# Patient Record
Sex: FEMALE | Race: OTHER | NOT HISPANIC OR LATINO
[De-identification: names, ages, dates, MRNs, and addresses within clinical notes are randomized per-mention and may not be internally consistent; named-entity substitution may affect disease eponyms.]

---

## 2019-12-09 ENCOUNTER — RESULT REVIEW (OUTPATIENT)
Age: 39
End: 2019-12-09

## 2020-02-05 ENCOUNTER — APPOINTMENT (OUTPATIENT)
Dept: HUMAN REPRODUCTION | Facility: CLINIC | Age: 40
End: 2020-02-05
Payer: COMMERCIAL

## 2020-02-05 ENCOUNTER — TRANSCRIPTION ENCOUNTER (OUTPATIENT)
Age: 40
End: 2020-02-05

## 2020-02-05 PROCEDURE — 99205 OFFICE O/P NEW HI 60 MIN: CPT | Mod: 25

## 2020-02-05 PROCEDURE — 76830 TRANSVAGINAL US NON-OB: CPT

## 2020-02-26 ENCOUNTER — OUTPATIENT (OUTPATIENT)
Dept: OUTPATIENT SERVICES | Facility: HOSPITAL | Age: 40
LOS: 1 days | End: 2020-02-26
Payer: COMMERCIAL

## 2020-02-26 ENCOUNTER — APPOINTMENT (OUTPATIENT)
Dept: HUMAN REPRODUCTION | Facility: CLINIC | Age: 40
End: 2020-02-26
Payer: COMMERCIAL

## 2020-02-26 ENCOUNTER — APPOINTMENT (OUTPATIENT)
Dept: MRI IMAGING | Facility: CLINIC | Age: 40
End: 2020-02-26

## 2020-02-26 DIAGNOSIS — Z00.8 ENCOUNTER FOR OTHER GENERAL EXAMINATION: ICD-10-CM

## 2020-02-26 PROCEDURE — 70553 MRI BRAIN STEM W/O & W/DYE: CPT | Mod: 26

## 2020-02-26 PROCEDURE — A9585: CPT

## 2020-02-26 PROCEDURE — 99214 OFFICE O/P EST MOD 30 MIN: CPT

## 2020-02-26 PROCEDURE — 70553 MRI BRAIN STEM W/O & W/DYE: CPT

## 2020-02-27 ENCOUNTER — APPOINTMENT (OUTPATIENT)
Dept: HUMAN REPRODUCTION | Facility: CLINIC | Age: 40
End: 2020-02-27

## 2020-03-04 ENCOUNTER — APPOINTMENT (OUTPATIENT)
Dept: HUMAN REPRODUCTION | Facility: CLINIC | Age: 40
End: 2020-03-04
Payer: COMMERCIAL

## 2020-03-04 PROCEDURE — 99214 OFFICE O/P EST MOD 30 MIN: CPT

## 2020-03-11 ENCOUNTER — APPOINTMENT (OUTPATIENT)
Dept: HUMAN REPRODUCTION | Facility: CLINIC | Age: 40
End: 2020-03-11
Payer: COMMERCIAL

## 2020-03-11 PROCEDURE — 99213 OFFICE O/P EST LOW 20 MIN: CPT | Mod: 25

## 2020-03-11 PROCEDURE — 76830 TRANSVAGINAL US NON-OB: CPT

## 2020-03-18 ENCOUNTER — APPOINTMENT (OUTPATIENT)
Dept: HUMAN REPRODUCTION | Facility: CLINIC | Age: 40
End: 2020-03-18

## 2020-04-26 ENCOUNTER — MESSAGE (OUTPATIENT)
Age: 40
End: 2020-04-26

## 2020-05-04 ENCOUNTER — APPOINTMENT (OUTPATIENT)
Dept: ENDOCRINOLOGY | Facility: CLINIC | Age: 40
End: 2020-05-04
Payer: COMMERCIAL

## 2020-05-04 PROCEDURE — 99204 OFFICE O/P NEW MOD 45 MIN: CPT | Mod: 95

## 2020-05-05 NOTE — ASSESSMENT
[FreeTextEntry1] : This is a 39 year old female with Hashimoto's thyroiditis, subclinical hypothyroidism, hyperprolactinemia, and pituitary microadenoma. \par She has subclinical hyperthyroidism and positive tpo abs. Will check TSI abs to check for Grave's Disease as cause for subclinical hyperthyroidism. Will need to establish euthyroidism prior to pregnancy. \par Check repeat prolactin. Check macroprolactin. \par MRI pituitary results have been requested. Check rest of anterior pituitary hormones (cortisol, IGF-1). \par Further management will be based on the above results.

## 2020-05-05 NOTE — CONSULT LETTER
[Dear  ___] : Dear  [unfilled], [Consult Letter:] : I had the pleasure of evaluating your patient, [unfilled]. [Please see my note below.] : Please see my note below. [Consult Closing:] : Thank you very much for allowing me to participate in the care of this patient.  If you have any questions, please do not hesitate to contact me. [Sincerely,] : Sincerely, [FreeTextEntry3] : Rita Bocanegra MD, FACE\par

## 2020-05-05 NOTE — HISTORY OF PRESENT ILLNESS
[FreeTextEntry1] : Mrs. Vásquez was the only participant in this video visit. \par \par CC: "Thyroid problem"\par \par This is a 39 year old female with abnormal TFTs, hyperprolactinemia, and pituitary microadenoma. \par She reports that she has been trying to conceive for appx 6 months. She was undergoing workup for infertility which is currently on hold due to Covid 19 pandemic. She was found to have hyperprolactinemia, low TSH, and pituitary microadenoma. \par TSH in 1/2020 was 0.24, repeat on 2/5/20 was 0.05, and repeat on 2/26/20 was 0.20. Free T4 was normal on all occasions. Thyroglobulin abs are negative and tpo abs are positive.  She denies a prior history of thyroid disease. There is no family history of thyroid disease. \par She was also found to have a prolactin level of 28 and 37 on repeat. MRI pituitary showed microadenoma per pt (results have been requested).

## 2020-05-05 NOTE — REASON FOR VISIT
[Home] : at home, [unfilled] , at the time of the visit. [Other Location: e.g. Home (Enter Location, City,State)___] : at [unfilled] [Patient] : the patient [Self] : self [Consultation] : a consultation visit [Other___] : [unfilled]

## 2020-05-06 LAB
SARS-COV-2 IGG SERPL IA-ACNC: 0.3 RATIO
SARS-COV-2 IGG SERPL QL IA: NEGATIVE

## 2020-05-07 ENCOUNTER — APPOINTMENT (OUTPATIENT)
Dept: DISASTER EMERGENCY | Facility: HOSPITAL | Age: 40
End: 2020-05-07

## 2020-06-02 DIAGNOSIS — E05.90 THYROTOXICOSIS, UNSPECIFIED W/OUT THYROTOXIC CRISIS OR STORM: ICD-10-CM

## 2020-06-02 LAB
CORTIS SERPL-MCNC: 9 UG/DL
ESTIMATED AVERAGE GLUCOSE: 105 MG/DL
HBA1C MFR BLD HPLC: 5.3 %
HCG SERPL-MCNC: <1 MIU/ML
IGF-1 INTERP: NORMAL
IGF-I BLD-MCNC: 210 NG/ML
PROLACTIN SERPL-MCNC: 17 NG/ML
T3 SERPL-MCNC: 93 NG/DL
T4 FREE SERPL-MCNC: 1.1 NG/DL
TSH SERPL-ACNC: 4.43 UIU/ML

## 2020-06-03 LAB — TSI ACT/NOR SER: <0.1 IU/L

## 2020-06-07 LAB
MONOMERIC PROLACTIN (ICMA)*: 17 NG/ML
PERCENT MACROPROLACTIN: 0 %
PROLACTIN, SERUM (ICMA)*: 17 NG/ML

## 2020-06-08 ENCOUNTER — APPOINTMENT (OUTPATIENT)
Dept: HUMAN REPRODUCTION | Facility: CLINIC | Age: 40
End: 2020-06-08
Payer: COMMERCIAL

## 2020-06-08 PROCEDURE — 99213 OFFICE O/P EST LOW 20 MIN: CPT | Mod: 95

## 2020-06-29 ENCOUNTER — APPOINTMENT (OUTPATIENT)
Dept: ENDOCRINOLOGY | Facility: CLINIC | Age: 40
End: 2020-06-29
Payer: COMMERCIAL

## 2020-06-29 PROCEDURE — 99441: CPT

## 2020-07-07 LAB
T4 FREE SERPL-MCNC: 1.3 NG/DL
TSH SERPL-ACNC: 2.32 UIU/ML

## 2020-07-14 ENCOUNTER — APPOINTMENT (OUTPATIENT)
Dept: HUMAN REPRODUCTION | Facility: CLINIC | Age: 40
End: 2020-07-14
Payer: COMMERCIAL

## 2020-07-14 PROCEDURE — 99213 OFFICE O/P EST LOW 20 MIN: CPT | Mod: 95

## 2020-07-15 ENCOUNTER — APPOINTMENT (OUTPATIENT)
Dept: ENDOCRINOLOGY | Facility: CLINIC | Age: 40
End: 2020-07-15
Payer: COMMERCIAL

## 2020-07-15 VITALS
SYSTOLIC BLOOD PRESSURE: 100 MMHG | WEIGHT: 97.32 LBS | BODY MASS INDEX: 19.36 KG/M2 | HEART RATE: 64 BPM | OXYGEN SATURATION: 100 % | HEIGHT: 59.45 IN | DIASTOLIC BLOOD PRESSURE: 64 MMHG | TEMPERATURE: 99.5 F

## 2020-07-15 DIAGNOSIS — E03.9 HYPOTHYROIDISM, UNSPECIFIED: ICD-10-CM

## 2020-07-15 DIAGNOSIS — Z86.39 PERSONAL HISTORY OF OTHER ENDOCRINE, NUTRITIONAL AND METABOLIC DISEASE: ICD-10-CM

## 2020-07-15 PROCEDURE — 36415 COLL VENOUS BLD VENIPUNCTURE: CPT

## 2020-07-15 PROCEDURE — 99214 OFFICE O/P EST MOD 30 MIN: CPT | Mod: 25

## 2020-07-15 NOTE — PHYSICAL EXAM
[Alert] : alert [Well Nourished] : well nourished [Healthy Appearance] : healthy appearance [No Acute Distress] : no acute distress [Well Developed] : well developed [Normal Voice/Communication] : normal voice communication [Normal Sclera/Conjunctiva] : normal sclera/conjunctiva [No Proptosis] : no proptosis [No Neck Mass] : no neck mass was observed [No LAD] : no lymphadenopathy [Supple] : the neck was supple [Thyroid Not Enlarged] : the thyroid was not enlarged [No Thyroid Nodules] : no palpable thyroid nodules [No Respiratory Distress] : no respiratory distress [Normal Rate] : heart rate was normal [No Accessory Muscle Use] : no accessory muscle use [Normal Rate and Effort] : normal respiratory rate and effort [No Stigmata of Cushings Syndrome] : no stigmata of Cushings Syndrome [Spine Straight] : spine straight [No Clubbing, Cyanosis] : no clubbing  or cyanosis of the fingernails [Normal Gait] : normal gait [No Involuntary Movements] : no involuntary movements were seen [Acanthosis Nigricans] : no acanthosis nigricans [Hirsutism] : no hirsutism [No Tremors] : no tremors [Normal Affect] : the affect was normal [Normal Insight/Judgement] : insight and judgment were intact [Normal Mood] : the mood was normal

## 2020-07-15 NOTE — HISTORY OF PRESENT ILLNESS
[FreeTextEntry1] : CC: Thyroid check\par \par This is a 39 year old female with Hashimoto's thyroiditis, hyperprolactinemia, and pituitary microadenoma. \par She was found to have hyperprolactinemia, low TSH, and pituitary microadenoma during a workup for infertility.  \par TSH in 1/2020 was 0.24, repeat on 2/5/20 was 0.05, and repeat on 2/26/20 was 0.20. Free T4 was normal on all occasions. Thyroglobulin abs are negative and tpo abs are positive. She is on LT4 25 mcg qd. She takes LT4 in the morning on an empty stomach. \par There is no family history of thyroid disease. \par She was also found to have a prolactin level of 28 and 37 on repeat. Prolactin level from 6/1/20 is 17. MRI pituitary showed 4.6mm area of hypoenhancement in the left lateral pituitary and 3mm hypoenhancement in the right posterior pituitary gland with infundibulium deviated rightward. There is no optic nerve or optic chiasm compression.

## 2020-07-15 NOTE — ASSESSMENT
[FreeTextEntry1] : This is a 39 year old female with Hashimoto's thyroiditis, subclinical hypothyroidism, hyperprolactinemia, and pituitary microadenoma. \par 1. Hashimoto's thyroiditis\par Check TFTs. \par Continue LT4 25mcg qd pending results. \par Increased thyroid hormone requirements during pregnancy were reviewed and she was advised to contact me if she is discovers that she is pregnant.\par 2. Hyperprolactinemia \par Normal on repeat x 3. \par 3. Pituitary microadenomas\par Will check MRI pituitary in one year as she is currently trying to conceive.

## 2020-07-17 LAB
T4 FREE SERPL-MCNC: 1.3 NG/DL
TSH SERPL-ACNC: 3.42 UIU/ML

## 2020-07-17 RX ORDER — LEVOTHYROXINE SODIUM 0.03 MG/1
25 TABLET ORAL
Qty: 90 | Refills: 0 | Status: DISCONTINUED | COMMUNITY
Start: 2020-06-02 | End: 2020-07-17

## 2020-08-19 ENCOUNTER — APPOINTMENT (OUTPATIENT)
Dept: HUMAN REPRODUCTION | Facility: CLINIC | Age: 40
End: 2020-08-19
Payer: COMMERCIAL

## 2020-08-19 PROCEDURE — 99213 OFFICE O/P EST LOW 20 MIN: CPT | Mod: 25

## 2020-08-19 PROCEDURE — 36415 COLL VENOUS BLD VENIPUNCTURE: CPT

## 2020-08-19 PROCEDURE — 76830 TRANSVAGINAL US NON-OB: CPT

## 2020-09-04 ENCOUNTER — APPOINTMENT (OUTPATIENT)
Dept: HUMAN REPRODUCTION | Facility: CLINIC | Age: 40
End: 2020-09-04
Payer: COMMERCIAL

## 2020-09-04 PROCEDURE — 36415 COLL VENOUS BLD VENIPUNCTURE: CPT

## 2020-09-04 PROCEDURE — 99213 OFFICE O/P EST LOW 20 MIN: CPT | Mod: 25

## 2020-09-04 PROCEDURE — 76830 TRANSVAGINAL US NON-OB: CPT

## 2020-09-11 ENCOUNTER — APPOINTMENT (OUTPATIENT)
Dept: HUMAN REPRODUCTION | Facility: CLINIC | Age: 40
End: 2020-09-11
Payer: COMMERCIAL

## 2020-09-11 PROCEDURE — 99213 OFFICE O/P EST LOW 20 MIN: CPT | Mod: 25

## 2020-09-11 PROCEDURE — 76830 TRANSVAGINAL US NON-OB: CPT

## 2020-09-11 PROCEDURE — 36415 COLL VENOUS BLD VENIPUNCTURE: CPT

## 2020-09-14 ENCOUNTER — APPOINTMENT (OUTPATIENT)
Dept: HUMAN REPRODUCTION | Facility: CLINIC | Age: 40
End: 2020-09-14
Payer: COMMERCIAL

## 2020-09-14 PROCEDURE — 58322 ARTIFICIAL INSEMINATION: CPT

## 2020-09-14 PROCEDURE — 99213 OFFICE O/P EST LOW 20 MIN: CPT | Mod: 25

## 2020-09-14 PROCEDURE — 89261 SPERM ISOLATION COMPLEX: CPT

## 2020-09-28 ENCOUNTER — APPOINTMENT (OUTPATIENT)
Dept: HUMAN REPRODUCTION | Facility: CLINIC | Age: 40
End: 2020-09-28
Payer: COMMERCIAL

## 2020-09-28 PROCEDURE — 99213 OFFICE O/P EST LOW 20 MIN: CPT | Mod: 25

## 2020-09-28 PROCEDURE — 36415 COLL VENOUS BLD VENIPUNCTURE: CPT

## 2020-09-28 PROCEDURE — 76830 TRANSVAGINAL US NON-OB: CPT

## 2020-10-01 ENCOUNTER — APPOINTMENT (OUTPATIENT)
Dept: HUMAN REPRODUCTION | Facility: CLINIC | Age: 40
End: 2020-10-01

## 2020-10-09 ENCOUNTER — APPOINTMENT (OUTPATIENT)
Dept: HUMAN REPRODUCTION | Facility: CLINIC | Age: 40
End: 2020-10-09
Payer: COMMERCIAL

## 2020-10-09 PROCEDURE — 36415 COLL VENOUS BLD VENIPUNCTURE: CPT

## 2020-10-09 PROCEDURE — 76817 TRANSVAGINAL US OBSTETRIC: CPT

## 2020-10-09 PROCEDURE — 99213 OFFICE O/P EST LOW 20 MIN: CPT | Mod: 25

## 2020-10-12 ENCOUNTER — APPOINTMENT (OUTPATIENT)
Dept: HUMAN REPRODUCTION | Facility: CLINIC | Age: 40
End: 2020-10-12
Payer: COMMERCIAL

## 2020-10-12 PROCEDURE — 99213 OFFICE O/P EST LOW 20 MIN: CPT | Mod: 25

## 2020-10-12 PROCEDURE — 36415 COLL VENOUS BLD VENIPUNCTURE: CPT

## 2020-10-12 PROCEDURE — 76817 TRANSVAGINAL US OBSTETRIC: CPT

## 2020-10-14 ENCOUNTER — APPOINTMENT (OUTPATIENT)
Dept: HUMAN REPRODUCTION | Facility: CLINIC | Age: 40
End: 2020-10-14
Payer: COMMERCIAL

## 2020-10-14 PROCEDURE — 76817 TRANSVAGINAL US OBSTETRIC: CPT

## 2020-10-14 PROCEDURE — 36415 COLL VENOUS BLD VENIPUNCTURE: CPT

## 2020-10-14 PROCEDURE — 99213 OFFICE O/P EST LOW 20 MIN: CPT | Mod: 25

## 2020-10-16 ENCOUNTER — APPOINTMENT (OUTPATIENT)
Dept: HUMAN REPRODUCTION | Facility: CLINIC | Age: 40
End: 2020-10-16
Payer: COMMERCIAL

## 2020-10-16 PROCEDURE — 36415 COLL VENOUS BLD VENIPUNCTURE: CPT

## 2020-10-16 PROCEDURE — 99213 OFFICE O/P EST LOW 20 MIN: CPT | Mod: 25

## 2020-10-16 PROCEDURE — 76817 TRANSVAGINAL US OBSTETRIC: CPT

## 2020-10-21 ENCOUNTER — APPOINTMENT (OUTPATIENT)
Dept: HUMAN REPRODUCTION | Facility: CLINIC | Age: 40
End: 2020-10-21
Payer: COMMERCIAL

## 2020-10-21 PROCEDURE — 99213 OFFICE O/P EST LOW 20 MIN: CPT | Mod: 25

## 2020-10-21 PROCEDURE — 36415 COLL VENOUS BLD VENIPUNCTURE: CPT

## 2020-10-21 PROCEDURE — 99072 ADDL SUPL MATRL&STAF TM PHE: CPT

## 2020-10-21 PROCEDURE — 76817 TRANSVAGINAL US OBSTETRIC: CPT

## 2020-11-04 ENCOUNTER — APPOINTMENT (OUTPATIENT)
Dept: HUMAN REPRODUCTION | Facility: CLINIC | Age: 40
End: 2020-11-04
Payer: COMMERCIAL

## 2020-11-04 PROCEDURE — 99213 OFFICE O/P EST LOW 20 MIN: CPT | Mod: 25

## 2020-11-04 PROCEDURE — 76817 TRANSVAGINAL US OBSTETRIC: CPT

## 2020-11-04 PROCEDURE — 99072 ADDL SUPL MATRL&STAF TM PHE: CPT

## 2020-11-04 PROCEDURE — 36415 COLL VENOUS BLD VENIPUNCTURE: CPT

## 2020-11-06 LAB
T4 FREE SERPL-MCNC: 1.3 NG/DL
TSH SERPL-ACNC: 1.23 UIU/ML

## 2020-11-18 ENCOUNTER — APPOINTMENT (OUTPATIENT)
Dept: HUMAN REPRODUCTION | Facility: CLINIC | Age: 40
End: 2020-11-18
Payer: COMMERCIAL

## 2020-11-18 PROCEDURE — 36415 COLL VENOUS BLD VENIPUNCTURE: CPT

## 2020-11-18 PROCEDURE — 99072 ADDL SUPL MATRL&STAF TM PHE: CPT

## 2020-11-18 PROCEDURE — 99213 OFFICE O/P EST LOW 20 MIN: CPT | Mod: 25

## 2020-11-18 PROCEDURE — 76830 TRANSVAGINAL US NON-OB: CPT

## 2020-11-20 ENCOUNTER — APPOINTMENT (OUTPATIENT)
Dept: HUMAN REPRODUCTION | Facility: CLINIC | Age: 40
End: 2020-11-20

## 2020-11-27 ENCOUNTER — APPOINTMENT (OUTPATIENT)
Dept: HUMAN REPRODUCTION | Facility: CLINIC | Age: 40
End: 2020-11-27
Payer: COMMERCIAL

## 2020-11-27 PROCEDURE — 99213 OFFICE O/P EST LOW 20 MIN: CPT | Mod: 25

## 2020-11-27 PROCEDURE — 76817 TRANSVAGINAL US OBSTETRIC: CPT

## 2020-11-27 PROCEDURE — 36415 COLL VENOUS BLD VENIPUNCTURE: CPT

## 2020-12-04 ENCOUNTER — APPOINTMENT (OUTPATIENT)
Dept: HUMAN REPRODUCTION | Facility: CLINIC | Age: 40
End: 2020-12-04
Payer: COMMERCIAL

## 2020-12-04 PROCEDURE — 99213 OFFICE O/P EST LOW 20 MIN: CPT | Mod: 25

## 2020-12-04 PROCEDURE — 99072 ADDL SUPL MATRL&STAF TM PHE: CPT

## 2020-12-04 PROCEDURE — 76817 TRANSVAGINAL US OBSTETRIC: CPT

## 2020-12-04 PROCEDURE — 36415 COLL VENOUS BLD VENIPUNCTURE: CPT

## 2020-12-11 ENCOUNTER — APPOINTMENT (OUTPATIENT)
Dept: HUMAN REPRODUCTION | Facility: CLINIC | Age: 40
End: 2020-12-11
Payer: COMMERCIAL

## 2020-12-11 PROCEDURE — 36415 COLL VENOUS BLD VENIPUNCTURE: CPT

## 2020-12-11 PROCEDURE — 76817 TRANSVAGINAL US OBSTETRIC: CPT

## 2020-12-11 PROCEDURE — 99072 ADDL SUPL MATRL&STAF TM PHE: CPT

## 2020-12-11 PROCEDURE — 99213 OFFICE O/P EST LOW 20 MIN: CPT | Mod: 25

## 2020-12-14 ENCOUNTER — APPOINTMENT (OUTPATIENT)
Dept: ENDOCRINOLOGY | Facility: CLINIC | Age: 40
End: 2020-12-14
Payer: COMMERCIAL

## 2020-12-14 PROCEDURE — 99214 OFFICE O/P EST MOD 30 MIN: CPT | Mod: 95

## 2020-12-16 LAB
T4 FREE SERPL-MCNC: 1.3 NG/DL
TSH SERPL-ACNC: 1.63 UIU/ML

## 2020-12-16 NOTE — ASSESSMENT
[FreeTextEntry1] : This is a 40 year old female with Hashimoto's thyroiditis, subclinical hypothyroidism, hyperprolactinemia, and pituitary microadenoma. \par 1. Hashimoto's thyroiditis\par Check TFTs. \par Continue LT4 50mcg qd plus extra 2 tabs/week pending results. \par 2. Hyperprolactinemia \par Normal on repeat x 3. \par 3. Pituitary microadenomas\par Will check MRI pituitary after she delivers.

## 2020-12-16 NOTE — HISTORY OF PRESENT ILLNESS
[FreeTextEntry1] : CC: Thyroid check\par \par This is a 40 year old female with Hashimoto's thyroiditis, hyperprolactinemia, and pituitary microadenoma. \par She was found to have hyperprolactinemia, low TSH, and pituitary microadenoma during a workup for infertility.\par In October 2020 she had a miscarriage at 5 weeks gestation.\par She is now approximately 8 weeks pregnant\par She is on levothyroxine 50 mcg daily and extra 2 tablets/week.  She started this dose 2 weeks ago.  She takes LT4 in the morning on an empty stomach. \par There is no family history of thyroid disease. \par She was also found to have a prolactin level of 28 and 37 on repeat. Prolactin level from 6/1/20 is 17. MRI pituitary showed 4.6mm area of hypoenhancement in the left lateral pituitary and 3mm hypoenhancement in the right posterior pituitary gland with infundibulium deviated rightward. There is no optic nerve or optic chiasm compression.\par \par LMP 10/16/2020\par MONTSERRAT 7/30/2021

## 2020-12-16 NOTE — REASON FOR VISIT
[Home] : at home, [unfilled] , at the time of the visit. [Medical Office: ([facility])___] : at the medical office located in  [Verbal consent obtained from patient] : the patient, [unfilled] [Follow - Up] : a follow-up visit [Hypothyroidism] : hypothyroidism

## 2020-12-17 ENCOUNTER — RESULT REVIEW (OUTPATIENT)
Age: 40
End: 2020-12-17

## 2021-01-19 ENCOUNTER — LABORATORY RESULT (OUTPATIENT)
Age: 41
End: 2021-01-19

## 2021-01-27 ENCOUNTER — APPOINTMENT (OUTPATIENT)
Dept: ENDOCRINOLOGY | Facility: CLINIC | Age: 41
End: 2021-01-27
Payer: COMMERCIAL

## 2021-01-27 PROCEDURE — 99213 OFFICE O/P EST LOW 20 MIN: CPT | Mod: 95

## 2021-01-30 NOTE — ASSESSMENT
[FreeTextEntry1] : This is a 40 year old female with Hashimoto's thyroiditis, subclinical hypothyroidism, hyperprolactinemia, and pituitary microadenoma who is currently 13 weeks and 4 days pregnant. \par 1. Hashimoto's thyroiditis\par Check TFTs. \par Continue LT4 50mcg qd plus extra 2 tabs/week pending results. \par 2. Hyperprolactinemia \par Normal on repeat x 3. \par 3. Pituitary microadenomas\par Will check MRI pituitary after she delivers.

## 2021-01-30 NOTE — REASON FOR VISIT
[Home] : at home, [unfilled] , at the time of the visit. [Medical Office: ([facility])___] : at the medical office located in  [Verbal consent obtained from patient] : the patient, [unfilled] [Follow - Up] : a follow-up visit [Hypothyroidism] : hypothyroidism

## 2021-02-04 ENCOUNTER — TRANSCRIPTION ENCOUNTER (OUTPATIENT)
Age: 41
End: 2021-02-04

## 2021-02-11 DIAGNOSIS — Z34.90 ENCOUNTER FOR SUPERVISION OF NORMAL PREGNANCY, UNSPECIFIED, UNSPECIFIED TRIMESTER: ICD-10-CM

## 2021-02-16 ENCOUNTER — TRANSCRIPTION ENCOUNTER (OUTPATIENT)
Age: 41
End: 2021-02-16

## 2021-02-16 DIAGNOSIS — Z78.9 OTHER SPECIFIED HEALTH STATUS: ICD-10-CM

## 2021-02-16 DIAGNOSIS — N80.0 ENDOMETRIOSIS OF UTERUS: ICD-10-CM

## 2021-02-16 DIAGNOSIS — Z33.2 ENCOUNTER FOR ELECTIVE TERMINATION OF PREGNANCY: ICD-10-CM

## 2021-02-16 DIAGNOSIS — Z84.2 FAMILY HISTORY OF OTHER DISEASES OF THE GENITOURINARY SYSTEM: ICD-10-CM

## 2021-02-16 LAB — CYTOLOGY CVX/VAG DOC THIN PREP: NEGATIVE

## 2021-02-16 RX ORDER — IRON, FOLIC ACID, CYANOCOBALAMIN, ASCORBIC ACID, AND DOCUSATE SODIUM 138; 88.5; 55; 13.2; 1.4; 16.8 MG/1; MG/1; MG/1; MG/1; MG/1; UG/1
90-1 TABLET, FILM COATED ORAL
Refills: 0 | Status: ACTIVE | COMMUNITY

## 2021-02-17 ENCOUNTER — NON-APPOINTMENT (OUTPATIENT)
Age: 41
End: 2021-02-17

## 2021-02-17 ENCOUNTER — APPOINTMENT (OUTPATIENT)
Dept: OBGYN | Facility: CLINIC | Age: 41
End: 2021-02-17
Payer: COMMERCIAL

## 2021-02-17 ENCOUNTER — LABORATORY RESULT (OUTPATIENT)
Age: 41
End: 2021-02-17

## 2021-02-17 ENCOUNTER — ASOB RESULT (OUTPATIENT)
Age: 41
End: 2021-02-17

## 2021-02-17 VITALS
DIASTOLIC BLOOD PRESSURE: 70 MMHG | WEIGHT: 111 LBS | HEIGHT: 59 IN | SYSTOLIC BLOOD PRESSURE: 106 MMHG | BODY MASS INDEX: 22.38 KG/M2

## 2021-02-17 LAB
BILIRUB UR QL STRIP: NORMAL
CLARITY UR: CLEAR
COLLECTION METHOD: NORMAL
GLUCOSE UR-MCNC: NORMAL
HCG UR QL: 0.2 EU/DL
HGB UR QL STRIP.AUTO: NORMAL
KETONES UR-MCNC: NORMAL
LEUKOCYTE ESTERASE UR QL STRIP: NORMAL
NITRITE UR QL STRIP: NORMAL
PH UR STRIP: 6
PROT UR STRIP-MCNC: NORMAL
SP GR UR STRIP: 1.01

## 2021-02-17 PROCEDURE — 36415 COLL VENOUS BLD VENIPUNCTURE: CPT

## 2021-02-17 PROCEDURE — 0502F SUBSEQUENT PRENATAL CARE: CPT

## 2021-02-17 PROCEDURE — 99072 ADDL SUPL MATRL&STAF TM PHE: CPT

## 2021-02-17 PROCEDURE — 81003 URINALYSIS AUTO W/O SCOPE: CPT | Mod: QW

## 2021-02-17 PROCEDURE — 76805 OB US >/= 14 WKS SNGL FETUS: CPT

## 2021-02-17 RX ORDER — ASCORBIC ACID, CHOLECALCIFEROL, .ALPHA.-TOCOPHEROL ACETATE, D-, THIAMINE HYDROCHLORIDE, RIBOFLAVIN, NIACIN, PYRIDOXINE HYDROCHLORIDE, LEVOMEFOLATE MAGNESIUM, FOLIC ACID, CYANOCOBALAMIN, IRON PENTACARBONYL, POTASSIUM IODIDE, MAGNESIUM OXIDE, DOCONEXENT, AND ICOSAPENT 55; 1000; 15; 1.3; 1.8; 5; 35; 600; 400; 14; 31; 200; 30; 200; 15 MG/1; [IU]/1; [IU]/1; MG/1; MG/1; MG/1; MG/1; UG/1; UG/1; UG/1; MG/1; UG/1; MG/1; MG/1; MG/1
31-0.6-0.4-2 CAPSULE, LIQUID FILLED ORAL DAILY
Qty: 30 | Refills: 11 | Status: ACTIVE | COMMUNITY
Start: 2021-02-17 | End: 1900-01-01

## 2021-03-03 ENCOUNTER — NON-APPOINTMENT (OUTPATIENT)
Age: 41
End: 2021-03-03

## 2021-03-03 ENCOUNTER — APPOINTMENT (OUTPATIENT)
Dept: ENDOCRINOLOGY | Facility: CLINIC | Age: 41
End: 2021-03-03
Payer: COMMERCIAL

## 2021-03-03 PROCEDURE — 99213 OFFICE O/P EST LOW 20 MIN: CPT | Mod: 95

## 2021-03-07 NOTE — HISTORY OF PRESENT ILLNESS
[FreeTextEntry1] : CC: Thyroid check\par \par This is a 40 year old female with Hashimoto's thyroiditis, hyperprolactinemia, and pituitary microadenoma. \par She was found to have hyperprolactinemia, low TSH, and pituitary microadenoma during a workup for infertility.\par In October 2020 she had a miscarriage at 5 weeks gestation. \par She is now approximately 18 weeks and 4 days pregnant. She conceived naturally.She is having a girl.\par She is on levothyroxine 50 mcg daily and extra 2 tablets/week (on Fridays and Saturdays).  She takes LT4 in the morning on an empty stomach. \par There is no family history of thyroid disease. \par She was also found to have a prolactin level of 28 and 37 on repeat. Prolactin level from 6/1/20 is 17. MRI pituitary showed 4.6mm area of hypoenhancement in the left lateral pituitary and 3mm hypoenhancement in the right posterior pituitary gland with infundibulium deviated rightward. There is no optic nerve or optic chiasm compression.\par \par LMP 10/16/2020\par MONTSERRAT 7/31/2021

## 2021-03-07 NOTE — ASSESSMENT
[FreeTextEntry1] : This is a 40 year old female with Hashimoto's thyroiditis, subclinical hypothyroidism, hyperprolactinemia, and pituitary microadenoma who is currently 18 weeks and 4 days pregnant. \par 1. Hashimoto's thyroiditis\par Check TFTs. \par Continue LT4 50mcg qd plus extra 2 tabs/week pending results. \par 2. Hyperprolactinemia \par Normal on repeat x 3. \par 3. Pituitary microadenomas\par Will check MRI pituitary after she delivers.

## 2021-03-07 NOTE — REASON FOR VISIT
[Home] : at home, [unfilled] , at the time of the visit. [Medical Office: ([facility])___] : at the medical office located in  [Verbal consent obtained from patient] : the patient, [unfilled] [Follow - Up] : a follow-up visit [Hypothyroidism] : hypothyroidism

## 2021-03-09 ENCOUNTER — NON-APPOINTMENT (OUTPATIENT)
Age: 41
End: 2021-03-09

## 2021-03-09 LAB
T4 FREE SERPL-MCNC: 1 NG/DL
T4 FREE SERPL-MCNC: 1.1 NG/DL
TSH SERPL-ACNC: 1.69 UIU/ML
TSH SERPL-ACNC: 2.41 UIU/ML

## 2021-03-11 ENCOUNTER — APPOINTMENT (OUTPATIENT)
Dept: ANTEPARTUM | Facility: CLINIC | Age: 41
End: 2021-03-11
Payer: COMMERCIAL

## 2021-03-11 ENCOUNTER — ASOB RESULT (OUTPATIENT)
Age: 41
End: 2021-03-11

## 2021-03-11 PROCEDURE — 99072 ADDL SUPL MATRL&STAF TM PHE: CPT

## 2021-03-11 PROCEDURE — 99202 OFFICE O/P NEW SF 15 MIN: CPT | Mod: 25

## 2021-03-11 PROCEDURE — ZZZZZ: CPT

## 2021-03-13 ENCOUNTER — NON-APPOINTMENT (OUTPATIENT)
Age: 41
End: 2021-03-13

## 2021-03-17 ENCOUNTER — NON-APPOINTMENT (OUTPATIENT)
Age: 41
End: 2021-03-17

## 2021-03-17 ENCOUNTER — APPOINTMENT (OUTPATIENT)
Dept: OBGYN | Facility: CLINIC | Age: 41
End: 2021-03-17
Payer: COMMERCIAL

## 2021-03-17 VITALS — HEIGHT: 60 IN | BODY MASS INDEX: 22.97 KG/M2 | WEIGHT: 117 LBS

## 2021-03-17 LAB
2ND TRIMESTER DATA: NORMAL
ADDENDUM DOC: NORMAL
AFP PNL SERPL: NORMAL
AFP SERPL-ACNC: NORMAL
BILIRUB UR QL STRIP: NORMAL
CLARITY UR: CLEAR
CLINICAL BIOCHEMIST REVIEW: NORMAL
COLLECTION METHOD: NORMAL
GLUCOSE UR-MCNC: NORMAL
HCG UR QL: 0.2 EU/DL
HGB UR QL STRIP.AUTO: NORMAL
KETONES UR-MCNC: NORMAL
LEUKOCYTE ESTERASE UR QL STRIP: NORMAL
NITRITE UR QL STRIP: NORMAL
NOTES NTD: NORMAL
PH UR STRIP: 7
PROT UR STRIP-MCNC: NORMAL
SP GR UR STRIP: 1.01

## 2021-03-17 PROCEDURE — 99072 ADDL SUPL MATRL&STAF TM PHE: CPT

## 2021-03-17 PROCEDURE — 81003 URINALYSIS AUTO W/O SCOPE: CPT | Mod: NC,QW

## 2021-03-20 ENCOUNTER — NON-APPOINTMENT (OUTPATIENT)
Age: 41
End: 2021-03-20

## 2021-04-05 ENCOUNTER — NON-APPOINTMENT (OUTPATIENT)
Age: 41
End: 2021-04-05

## 2021-04-05 ENCOUNTER — APPOINTMENT (OUTPATIENT)
Dept: OBGYN | Facility: CLINIC | Age: 41
End: 2021-04-05
Payer: COMMERCIAL

## 2021-04-05 VITALS
DIASTOLIC BLOOD PRESSURE: 70 MMHG | BODY MASS INDEX: 23.16 KG/M2 | WEIGHT: 118 LBS | SYSTOLIC BLOOD PRESSURE: 120 MMHG | HEIGHT: 60 IN

## 2021-04-05 LAB
BILIRUB UR QL STRIP: NORMAL
CLARITY UR: CLEAR
COLLECTION METHOD: NORMAL
GLUCOSE UR-MCNC: NORMAL
HCG UR QL: 0.2 EU/DL
HGB UR QL STRIP.AUTO: NORMAL
KETONES UR-MCNC: NORMAL
LEUKOCYTE ESTERASE UR QL STRIP: NORMAL
NITRITE UR QL STRIP: NORMAL
PH UR STRIP: 6
PROT UR STRIP-MCNC: NORMAL
SP GR UR STRIP: 1.02

## 2021-04-05 PROCEDURE — 81003 URINALYSIS AUTO W/O SCOPE: CPT | Mod: NC,QW

## 2021-04-05 PROCEDURE — 0502F SUBSEQUENT PRENATAL CARE: CPT

## 2021-04-05 RX ORDER — NORETHINDRONE ACETATE 5 MG/1
5 TABLET ORAL
Refills: 0 | Status: DISCONTINUED | COMMUNITY
End: 2021-04-05

## 2021-04-19 ENCOUNTER — APPOINTMENT (OUTPATIENT)
Dept: ENDOCRINOLOGY | Facility: CLINIC | Age: 41
End: 2021-04-19
Payer: COMMERCIAL

## 2021-04-19 PROCEDURE — 99212 OFFICE O/P EST SF 10 MIN: CPT | Mod: 95

## 2021-04-19 NOTE — ASSESSMENT
[FreeTextEntry1] : This is a 40 year old female with Hashimoto's thyroiditis, subclinical hypothyroidism, hyperprolactinemia, and pituitary microadenoma who is currently 25 weeks +2 days pregnant\par 1. Hashimoto's thyroiditis\par Check TFTs. \par Continue LT4 50mcg qd plus extra 2 tabs/week pending results. \par 2. Hyperprolactinemia \par Normal on repeat x 3. \par 3. Pituitary microadenomas\par Will check MRI pituitary after she delivers.

## 2021-04-19 NOTE — HISTORY OF PRESENT ILLNESS
[FreeTextEntry1] : CC: Thyroid check\par \par This is a 40 year old female with Hashimoto's thyroiditis, hyperprolactinemia, and pituitary microadenoma. \par She was found to have hyperprolactinemia, low TSH, and pituitary microadenoma during a workup for infertility.\par In October 2020 she had a miscarriage at 5 weeks gestation. \par She is now 25 weeks +2 days She conceived naturally.She is having a girl.\par She is on levothyroxine 50 mcg daily and extra 2 tablets/week (on Fridays and Saturdays).  She takes LT4 in the morning on an empty stomach. \par There is no family history of thyroid disease. \par She was also found to have a prolactin level of 28 and 37 on repeat. Prolactin level from 6/1/20 is 17. MRI pituitary showed 4.6mm area of hypoenhancement in the left lateral pituitary and 3mm hypoenhancement in the right posterior pituitary gland with infundibulium deviated rightward. There is no optic nerve or optic chiasm compression.\par \par LMP 10/16/2020\par MONTSERRAT 7/31/2021

## 2021-04-19 NOTE — REASON FOR VISIT
[Home] : at home, [unfilled] , at the time of the visit. [Medical Office: ([facility])___] : at the medical office located in  [Verbal consent obtained from patient] : the patient, [unfilled] [Hypothyroidism] : hypothyroidism

## 2021-04-20 LAB
T4 FREE SERPL-MCNC: 0.9 NG/DL
TSH SERPL-ACNC: 1.35 UIU/ML

## 2021-04-21 ENCOUNTER — NON-APPOINTMENT (OUTPATIENT)
Age: 41
End: 2021-04-21

## 2021-04-26 ENCOUNTER — ASOB RESULT (OUTPATIENT)
Age: 41
End: 2021-04-26

## 2021-04-26 ENCOUNTER — APPOINTMENT (OUTPATIENT)
Dept: ANTEPARTUM | Facility: CLINIC | Age: 41
End: 2021-04-26
Payer: COMMERCIAL

## 2021-04-26 PROCEDURE — 99072 ADDL SUPL MATRL&STAF TM PHE: CPT

## 2021-04-26 PROCEDURE — 76816 OB US FOLLOW-UP PER FETUS: CPT

## 2021-05-05 ENCOUNTER — NON-APPOINTMENT (OUTPATIENT)
Age: 41
End: 2021-05-05

## 2021-05-05 ENCOUNTER — ASOB RESULT (OUTPATIENT)
Age: 41
End: 2021-05-05

## 2021-05-05 ENCOUNTER — APPOINTMENT (OUTPATIENT)
Dept: OBGYN | Facility: CLINIC | Age: 41
End: 2021-05-05
Payer: COMMERCIAL

## 2021-05-05 ENCOUNTER — RESULT CHARGE (OUTPATIENT)
Age: 41
End: 2021-05-05

## 2021-05-05 VITALS
BODY MASS INDEX: 24.35 KG/M2 | DIASTOLIC BLOOD PRESSURE: 80 MMHG | SYSTOLIC BLOOD PRESSURE: 120 MMHG | WEIGHT: 124 LBS | HEIGHT: 60 IN

## 2021-05-05 LAB
BASOPHILS # BLD AUTO: 0.04 K/UL
BASOPHILS NFR BLD AUTO: 0.5 %
BILIRUB UR QL STRIP: NORMAL
BLD GP AB SCN SERPL QL: NORMAL
CLARITY UR: CLEAR
COLLECTION METHOD: NORMAL
EOSINOPHIL # BLD AUTO: 0.12 K/UL
EOSINOPHIL NFR BLD AUTO: 1.4 %
GLUCOSE UR-MCNC: NORMAL
HCG UR QL: 0.2 EU/DL
HCT VFR BLD CALC: 35.9 %
HGB BLD-MCNC: 11.9 G/DL
HGB UR QL STRIP.AUTO: NORMAL
IMM GRANULOCYTES NFR BLD AUTO: 0.8 %
KETONES UR-MCNC: NORMAL
LEUKOCYTE ESTERASE UR QL STRIP: NORMAL
LYMPHOCYTES # BLD AUTO: 2 K/UL
LYMPHOCYTES NFR BLD AUTO: 23 %
MAN DIFF?: NORMAL
MCHC RBC-ENTMCNC: 31.9 PG
MCHC RBC-ENTMCNC: 33.1 GM/DL
MCV RBC AUTO: 96.2 FL
MONOCYTES # BLD AUTO: 0.59 K/UL
MONOCYTES NFR BLD AUTO: 6.8 %
NEUTROPHILS # BLD AUTO: 5.86 K/UL
NEUTROPHILS NFR BLD AUTO: 67.5 %
NITRITE UR QL STRIP: NORMAL
PH UR STRIP: 7
PLATELET # BLD AUTO: 265 K/UL
PROT UR STRIP-MCNC: NORMAL
RBC # BLD: 3.73 M/UL
RBC # FLD: 11.9 %
SP GR UR STRIP: 1.01
WBC # FLD AUTO: 8.68 K/UL

## 2021-05-05 PROCEDURE — 81002 URINALYSIS NONAUTO W/O SCOPE: CPT | Mod: NC

## 2021-05-05 PROCEDURE — 36415 COLL VENOUS BLD VENIPUNCTURE: CPT

## 2021-05-05 PROCEDURE — 0502F SUBSEQUENT PRENATAL CARE: CPT

## 2021-05-05 PROCEDURE — 76816 OB US FOLLOW-UP PER FETUS: CPT

## 2021-05-05 PROCEDURE — 99072 ADDL SUPL MATRL&STAF TM PHE: CPT

## 2021-05-06 LAB — HIV1+2 AB SPEC QL IA.RAPID: NONREACTIVE

## 2021-05-07 ENCOUNTER — NON-APPOINTMENT (OUTPATIENT)
Age: 41
End: 2021-05-07

## 2021-05-07 LAB — GLUCOSE 1H P 50 G GLC PO SERPL-MCNC: 171 MG/DL

## 2021-05-19 ENCOUNTER — ASOB RESULT (OUTPATIENT)
Age: 41
End: 2021-05-19

## 2021-05-19 ENCOUNTER — APPOINTMENT (OUTPATIENT)
Dept: MATERNAL FETAL MEDICINE | Facility: CLINIC | Age: 41
End: 2021-05-19
Payer: COMMERCIAL

## 2021-05-19 PROCEDURE — G0109 DIAB MANAGE TRN IND/GROUP: CPT | Mod: 95

## 2021-05-19 RX ORDER — LANCETS 33 GAUGE
EACH MISCELLANEOUS
Qty: 4 | Refills: 1 | Status: ACTIVE | COMMUNITY
Start: 2021-05-19 | End: 1900-01-01

## 2021-05-19 RX ORDER — BLOOD-GLUCOSE METER
KIT MISCELLANEOUS 4 TIMES DAILY
Qty: 4 | Refills: 1 | Status: ACTIVE | COMMUNITY
Start: 2021-05-19 | End: 1900-01-01

## 2021-05-19 RX ORDER — BLOOD-GLUCOSE METER
W/DEVICE KIT MISCELLANEOUS
Qty: 1 | Refills: 0 | Status: ACTIVE | COMMUNITY
Start: 2021-05-19 | End: 1900-01-01

## 2021-05-19 RX ORDER — URINE ACETONE TEST STRIPS
STRIP MISCELLANEOUS
Qty: 1 | Refills: 1 | Status: ACTIVE | COMMUNITY
Start: 2021-05-19 | End: 1900-01-01

## 2021-05-20 ENCOUNTER — ASOB RESULT (OUTPATIENT)
Age: 41
End: 2021-05-20

## 2021-05-20 ENCOUNTER — APPOINTMENT (OUTPATIENT)
Dept: ANTEPARTUM | Facility: CLINIC | Age: 41
End: 2021-05-20
Payer: COMMERCIAL

## 2021-05-20 PROCEDURE — 76816 OB US FOLLOW-UP PER FETUS: CPT

## 2021-05-20 PROCEDURE — 99072 ADDL SUPL MATRL&STAF TM PHE: CPT

## 2021-05-27 ENCOUNTER — NON-APPOINTMENT (OUTPATIENT)
Age: 41
End: 2021-05-27

## 2021-06-02 ENCOUNTER — APPOINTMENT (OUTPATIENT)
Dept: ENDOCRINOLOGY | Facility: CLINIC | Age: 41
End: 2021-06-02
Payer: COMMERCIAL

## 2021-06-02 ENCOUNTER — ASOB RESULT (OUTPATIENT)
Age: 41
End: 2021-06-02

## 2021-06-02 ENCOUNTER — APPOINTMENT (OUTPATIENT)
Dept: MATERNAL FETAL MEDICINE | Facility: CLINIC | Age: 41
End: 2021-06-02
Payer: COMMERCIAL

## 2021-06-02 PROCEDURE — G0108 DIAB MANAGE TRN  PER INDIV: CPT | Mod: 95

## 2021-06-02 PROCEDURE — 99211 OFF/OP EST MAY X REQ PHY/QHP: CPT | Mod: 95

## 2021-06-03 NOTE — REASON FOR VISIT
[Home] : at home, [unfilled] , at the time of the visit. [Medical Office: ([facility])___] : at the medical office located in  [Verbal consent obtained from patient] : the patient, [unfilled] [Follow - Up] : a follow-up visit [Hypothyroidism] : hypothyroidism

## 2021-06-03 NOTE — ASSESSMENT
[FreeTextEntry1] : This is a 40 year old female with Hashimoto's thyroiditis, subclinical hypothyroidism, hyperprolactinemia, and pituitary microadenoma who is currently 31 weeks +4 days pregnant\par 1. Hashimoto's thyroiditis\par Check TFTs. \par Continue LT4 50mcg qd plus extra 2 tabs/week pending results. \par 2. Hyperprolactinemia \par Normal on repeat x 3. \par 3. Pituitary microadenomas\par Will check MRI pituitary after she delivers.

## 2021-06-03 NOTE — HISTORY OF PRESENT ILLNESS
[FreeTextEntry1] : CC: Thyroid check\par \par This is a 40 year old female with Hashimoto's thyroiditis, hyperprolactinemia, and pituitary microadenoma. \par She was found to have hyperprolactinemia, low TSH, and pituitary microadenoma during a workup for infertility.\par In October 2020 she had a miscarriage at 5 weeks gestation. \par She is now 31 weeks +4 days She conceived naturally.She is having a girl.\par She is on levothyroxine 50 mcg daily and extra 2 tablets/week (on Fridays and Saturdays).  She takes LT4 in the morning on an empty stomach. \par There is no family history of thyroid disease.\par She had elevated glucose level on 1 hour OGTT.  She is following with MFM and monitoring fingerstick glucose levels.\par She was also found to have a prolactin level of 28 and 37 on repeat. Prolactin level from 6/1/20 is 17. MRI pituitary showed 4.6mm area of hypoenhancement in the left lateral pituitary and 3mm hypoenhancement in the right posterior pituitary gland with infundibulium deviated rightward. There is no optic nerve or optic chiasm compression.\par \par LMP 10/16/2020\par MONTSERRAT 7/31/2021
No

## 2021-06-04 ENCOUNTER — NON-APPOINTMENT (OUTPATIENT)
Age: 41
End: 2021-06-04

## 2021-06-04 ENCOUNTER — LABORATORY RESULT (OUTPATIENT)
Age: 41
End: 2021-06-04

## 2021-06-04 ENCOUNTER — ASOB RESULT (OUTPATIENT)
Age: 41
End: 2021-06-04

## 2021-06-04 ENCOUNTER — APPOINTMENT (OUTPATIENT)
Dept: OBGYN | Facility: CLINIC | Age: 41
End: 2021-06-04
Payer: COMMERCIAL

## 2021-06-04 VITALS
BODY MASS INDEX: 24.74 KG/M2 | HEIGHT: 60 IN | WEIGHT: 126 LBS | SYSTOLIC BLOOD PRESSURE: 138 MMHG | DIASTOLIC BLOOD PRESSURE: 80 MMHG

## 2021-06-04 PROCEDURE — 76816 OB US FOLLOW-UP PER FETUS: CPT

## 2021-06-04 PROCEDURE — 81003 URINALYSIS AUTO W/O SCOPE: CPT | Mod: NC,QW

## 2021-06-04 PROCEDURE — 76820 UMBILICAL ARTERY ECHO: CPT

## 2021-06-04 PROCEDURE — 0502F SUBSEQUENT PRENATAL CARE: CPT

## 2021-06-04 PROCEDURE — 99072 ADDL SUPL MATRL&STAF TM PHE: CPT

## 2021-06-04 PROCEDURE — 76819 FETAL BIOPHYS PROFIL W/O NST: CPT

## 2021-06-05 LAB
BILIRUB UR QL STRIP: NORMAL
CLARITY UR: CLEAR
COLLECTION METHOD: NORMAL
GLUCOSE UR-MCNC: NORMAL
HCG UR QL: NORMAL EU/DL
HGB UR QL STRIP.AUTO: NORMAL
KETONES UR-MCNC: NORMAL
LEUKOCYTE ESTERASE UR QL STRIP: NORMAL
NITRITE UR QL STRIP: NORMAL
PH UR STRIP: 6
PROT UR STRIP-MCNC: NORMAL

## 2021-06-07 ENCOUNTER — NON-APPOINTMENT (OUTPATIENT)
Age: 41
End: 2021-06-07

## 2021-06-11 ENCOUNTER — APPOINTMENT (OUTPATIENT)
Dept: OBGYN | Facility: CLINIC | Age: 41
End: 2021-06-11
Payer: COMMERCIAL

## 2021-06-11 ENCOUNTER — NON-APPOINTMENT (OUTPATIENT)
Age: 41
End: 2021-06-11

## 2021-06-11 ENCOUNTER — ASOB RESULT (OUTPATIENT)
Age: 41
End: 2021-06-11

## 2021-06-11 ENCOUNTER — RESULT CHARGE (OUTPATIENT)
Age: 41
End: 2021-06-11

## 2021-06-11 VITALS
SYSTOLIC BLOOD PRESSURE: 120 MMHG | HEIGHT: 60 IN | DIASTOLIC BLOOD PRESSURE: 80 MMHG | WEIGHT: 128 LBS | BODY MASS INDEX: 25.13 KG/M2

## 2021-06-11 DIAGNOSIS — O99.810 ABNORMAL GLUCOSE COMPLICATING PREGNANCY: ICD-10-CM

## 2021-06-11 PROCEDURE — 99072 ADDL SUPL MATRL&STAF TM PHE: CPT

## 2021-06-11 PROCEDURE — 81002 URINALYSIS NONAUTO W/O SCOPE: CPT | Mod: NC

## 2021-06-11 PROCEDURE — 76819 FETAL BIOPHYS PROFIL W/O NST: CPT

## 2021-06-11 PROCEDURE — 0502F SUBSEQUENT PRENATAL CARE: CPT

## 2021-06-11 PROCEDURE — 76820 UMBILICAL ARTERY ECHO: CPT

## 2021-06-21 ENCOUNTER — NON-APPOINTMENT (OUTPATIENT)
Age: 41
End: 2021-06-21

## 2021-06-21 ENCOUNTER — APPOINTMENT (OUTPATIENT)
Dept: OBGYN | Facility: CLINIC | Age: 41
End: 2021-06-21
Payer: COMMERCIAL

## 2021-06-21 ENCOUNTER — ASOB RESULT (OUTPATIENT)
Age: 41
End: 2021-06-21

## 2021-06-21 ENCOUNTER — RESULT CHARGE (OUTPATIENT)
Age: 41
End: 2021-06-21

## 2021-06-21 ENCOUNTER — INPATIENT (INPATIENT)
Facility: HOSPITAL | Age: 41
LOS: 9 days | Discharge: HOME | End: 2021-07-01
Attending: OBSTETRICS & GYNECOLOGY | Admitting: OBSTETRICS & GYNECOLOGY
Payer: COMMERCIAL

## 2021-06-21 VITALS
DIASTOLIC BLOOD PRESSURE: 90 MMHG | SYSTOLIC BLOOD PRESSURE: 140 MMHG | HEIGHT: 60 IN | WEIGHT: 130 LBS | BODY MASS INDEX: 25.52 KG/M2

## 2021-06-21 VITALS
DIASTOLIC BLOOD PRESSURE: 89 MMHG | RESPIRATION RATE: 18 BRPM | HEART RATE: 82 BPM | TEMPERATURE: 99 F | SYSTOLIC BLOOD PRESSURE: 159 MMHG

## 2021-06-21 DIAGNOSIS — Z3A.00 WEEKS OF GESTATION OF PREGNANCY NOT SPECIFIED: ICD-10-CM

## 2021-06-21 DIAGNOSIS — O36.5990 MATERNAL CARE FOR OTHER KNOWN OR SUSPECTED POOR FETAL GROWTH, UNSPECIFIED TRIMESTER, NOT APPLICABLE OR UNSPECIFIED: ICD-10-CM

## 2021-06-21 DIAGNOSIS — K08.409 PARTIAL LOSS OF TEETH, UNSPECIFIED CAUSE, UNSPECIFIED CLASS: Chronic | ICD-10-CM

## 2021-06-21 DIAGNOSIS — Z34.80 ENCOUNTER FOR SUPERVISION OF OTHER NORMAL PREGNANCY, UNSPECIFIED TRIMESTER: ICD-10-CM

## 2021-06-21 DIAGNOSIS — O26.899 OTHER SPECIFIED PREGNANCY RELATED CONDITIONS, UNSPECIFIED TRIMESTER: ICD-10-CM

## 2021-06-21 DIAGNOSIS — Z98.890 OTHER SPECIFIED POSTPROCEDURAL STATES: Chronic | ICD-10-CM

## 2021-06-21 LAB
ALBUMIN SERPL ELPH-MCNC: 3.6 G/DL — SIGNIFICANT CHANGE UP (ref 3.3–5)
ALP SERPL-CCNC: 137 U/L — HIGH (ref 40–120)
ALT FLD-CCNC: 25 U/L — SIGNIFICANT CHANGE UP (ref 10–45)
ANION GAP SERPL CALC-SCNC: 15 MMOL/L — SIGNIFICANT CHANGE UP (ref 5–17)
APPEARANCE UR: CLEAR — SIGNIFICANT CHANGE UP
APTT BLD: 28.1 SEC — SIGNIFICANT CHANGE UP (ref 27.5–35.5)
AST SERPL-CCNC: 25 U/L — SIGNIFICANT CHANGE UP (ref 10–40)
BASOPHILS # BLD AUTO: 0.04 K/UL — SIGNIFICANT CHANGE UP (ref 0–0.2)
BASOPHILS NFR BLD AUTO: 0.5 % — SIGNIFICANT CHANGE UP (ref 0–2)
BILIRUB SERPL-MCNC: 0.2 MG/DL — SIGNIFICANT CHANGE UP (ref 0.2–1.2)
BILIRUB UR-MCNC: NEGATIVE — SIGNIFICANT CHANGE UP
BLD GP AB SCN SERPL QL: NEGATIVE — SIGNIFICANT CHANGE UP
BUN SERPL-MCNC: 9 MG/DL — SIGNIFICANT CHANGE UP (ref 7–23)
CALCIUM SERPL-MCNC: 9.8 MG/DL — SIGNIFICANT CHANGE UP (ref 8.4–10.5)
CHLORIDE SERPL-SCNC: 102 MMOL/L — SIGNIFICANT CHANGE UP (ref 96–108)
CO2 SERPL-SCNC: 19 MMOL/L — LOW (ref 22–31)
COLOR SPEC: COLORLESS — SIGNIFICANT CHANGE UP
CREAT ?TM UR-MCNC: 15 MG/DL — SIGNIFICANT CHANGE UP
CREAT SERPL-MCNC: 0.59 MG/DL — SIGNIFICANT CHANGE UP (ref 0.5–1.3)
DIFF PNL FLD: NEGATIVE — SIGNIFICANT CHANGE UP
EOSINOPHIL # BLD AUTO: 0.08 K/UL — SIGNIFICANT CHANGE UP (ref 0–0.5)
EOSINOPHIL NFR BLD AUTO: 0.9 % — SIGNIFICANT CHANGE UP (ref 0–6)
FIBRINOGEN PPP-MCNC: 564 MG/DL — HIGH (ref 290–520)
GLUCOSE SERPL-MCNC: 75 MG/DL — SIGNIFICANT CHANGE UP (ref 70–99)
GLUCOSE UR QL: NEGATIVE — SIGNIFICANT CHANGE UP
HCT VFR BLD CALC: 36.9 % — SIGNIFICANT CHANGE UP (ref 34.5–45)
HGB BLD-MCNC: 12.1 G/DL — SIGNIFICANT CHANGE UP (ref 11.5–15.5)
IMM GRANULOCYTES NFR BLD AUTO: 0.4 % — SIGNIFICANT CHANGE UP (ref 0–1.5)
INR BLD: 0.9 RATIO — SIGNIFICANT CHANGE UP (ref 0.88–1.16)
KETONES UR-MCNC: NEGATIVE — SIGNIFICANT CHANGE UP
LDH SERPL L TO P-CCNC: 225 U/L — SIGNIFICANT CHANGE UP (ref 50–242)
LEUKOCYTE ESTERASE UR-ACNC: NEGATIVE — SIGNIFICANT CHANGE UP
LYMPHOCYTES # BLD AUTO: 2.08 K/UL — SIGNIFICANT CHANGE UP (ref 1–3.3)
LYMPHOCYTES # BLD AUTO: 24.4 % — SIGNIFICANT CHANGE UP (ref 13–44)
MCHC RBC-ENTMCNC: 30.3 PG — SIGNIFICANT CHANGE UP (ref 27–34)
MCHC RBC-ENTMCNC: 32.8 GM/DL — SIGNIFICANT CHANGE UP (ref 32–36)
MCV RBC AUTO: 92.3 FL — SIGNIFICANT CHANGE UP (ref 80–100)
MONOCYTES # BLD AUTO: 0.68 K/UL — SIGNIFICANT CHANGE UP (ref 0–0.9)
MONOCYTES NFR BLD AUTO: 8 % — SIGNIFICANT CHANGE UP (ref 2–14)
NEUTROPHILS # BLD AUTO: 5.63 K/UL — SIGNIFICANT CHANGE UP (ref 1.8–7.4)
NEUTROPHILS NFR BLD AUTO: 65.8 % — SIGNIFICANT CHANGE UP (ref 43–77)
NITRITE UR-MCNC: NEGATIVE — SIGNIFICANT CHANGE UP
NRBC # BLD: 0 /100 WBCS — SIGNIFICANT CHANGE UP (ref 0–0)
PH UR: 6.5 — SIGNIFICANT CHANGE UP (ref 5–8)
PLATELET # BLD AUTO: 281 K/UL — SIGNIFICANT CHANGE UP (ref 150–400)
POTASSIUM SERPL-MCNC: 3.7 MMOL/L — SIGNIFICANT CHANGE UP (ref 3.5–5.3)
POTASSIUM SERPL-SCNC: 3.7 MMOL/L — SIGNIFICANT CHANGE UP (ref 3.5–5.3)
PROT ?TM UR-MCNC: <4 MG/DL — SIGNIFICANT CHANGE UP (ref 0–12)
PROT ?TM UR-MCNC: <4 MG/DL — SIGNIFICANT CHANGE UP (ref 0–12)
PROT SERPL-MCNC: 7 G/DL — SIGNIFICANT CHANGE UP (ref 6–8.3)
PROT UR-MCNC: NEGATIVE — SIGNIFICANT CHANGE UP
PROT/CREAT UR-RTO: <0.3 RATIO — HIGH (ref 0–0.2)
PROTHROM AB SERPL-ACNC: 10.9 SEC — SIGNIFICANT CHANGE UP (ref 10.6–13.6)
RBC # BLD: 4 M/UL — SIGNIFICANT CHANGE UP (ref 3.8–5.2)
RBC # FLD: 11.9 % — SIGNIFICANT CHANGE UP (ref 10.3–14.5)
RH IG SCN BLD-IMP: POSITIVE — SIGNIFICANT CHANGE UP
RH IG SCN BLD-IMP: POSITIVE — SIGNIFICANT CHANGE UP
SARS-COV-2 RNA SPEC QL NAA+PROBE: SIGNIFICANT CHANGE UP
SODIUM SERPL-SCNC: 136 MMOL/L — SIGNIFICANT CHANGE UP (ref 135–145)
SP GR SPEC: 1 — LOW (ref 1.01–1.02)
URATE SERPL-MCNC: 4.6 MG/DL — SIGNIFICANT CHANGE UP (ref 2.5–7)
UROBILINOGEN FLD QL: NEGATIVE — SIGNIFICANT CHANGE UP
WBC # BLD: 8.54 K/UL — SIGNIFICANT CHANGE UP (ref 3.8–10.5)
WBC # FLD AUTO: 8.54 K/UL — SIGNIFICANT CHANGE UP (ref 3.8–10.5)

## 2021-06-21 PROCEDURE — 76816 OB US FOLLOW-UP PER FETUS: CPT

## 2021-06-21 PROCEDURE — 76819 FETAL BIOPHYS PROFIL W/O NST: CPT

## 2021-06-21 PROCEDURE — 76820 UMBILICAL ARTERY ECHO: CPT

## 2021-06-21 PROCEDURE — 99072 ADDL SUPL MATRL&STAF TM PHE: CPT

## 2021-06-21 PROCEDURE — 99221 1ST HOSP IP/OBS SF/LOW 40: CPT

## 2021-06-21 PROCEDURE — 99222 1ST HOSP IP/OBS MODERATE 55: CPT

## 2021-06-21 PROCEDURE — 81002 URINALYSIS NONAUTO W/O SCOPE: CPT

## 2021-06-21 RX ORDER — SODIUM CHLORIDE 9 MG/ML
3 INJECTION INTRAMUSCULAR; INTRAVENOUS; SUBCUTANEOUS EVERY 8 HOURS
Refills: 0 | Status: DISCONTINUED | OUTPATIENT
Start: 2021-06-21 | End: 2021-06-26

## 2021-06-21 RX ORDER — LEVOTHYROXINE SODIUM 125 MCG
50 TABLET ORAL DAILY
Refills: 0 | Status: DISCONTINUED | OUTPATIENT
Start: 2021-06-21 | End: 2021-06-26

## 2021-06-21 RX ORDER — AMPICILLIN TRIHYDRATE 250 MG
2 CAPSULE ORAL ONCE
Refills: 0 | Status: COMPLETED | OUTPATIENT
Start: 2021-06-21 | End: 2021-06-21

## 2021-06-21 RX ORDER — NIFEDIPINE 30 MG
10 TABLET, EXTENDED RELEASE 24 HR ORAL
Refills: 0 | Status: COMPLETED | OUTPATIENT
Start: 2021-06-21 | End: 2021-06-21

## 2021-06-21 RX ORDER — SODIUM CHLORIDE 9 MG/ML
1000 INJECTION, SOLUTION INTRAVENOUS
Refills: 0 | Status: DISCONTINUED | OUTPATIENT
Start: 2021-06-21 | End: 2021-06-22

## 2021-06-21 RX ORDER — FOLIC ACID 0.8 MG
1 TABLET ORAL DAILY
Refills: 0 | Status: DISCONTINUED | OUTPATIENT
Start: 2021-06-21 | End: 2021-06-26

## 2021-06-21 RX ORDER — AMPICILLIN TRIHYDRATE 250 MG
1 CAPSULE ORAL EVERY 4 HOURS
Refills: 0 | Status: DISCONTINUED | OUTPATIENT
Start: 2021-06-21 | End: 2021-06-21

## 2021-06-21 RX ORDER — NIFEDIPINE 30 MG
10 TABLET, EXTENDED RELEASE 24 HR ORAL EVERY 6 HOURS
Refills: 0 | Status: DISCONTINUED | OUTPATIENT
Start: 2021-06-21 | End: 2021-06-21

## 2021-06-21 RX ORDER — NIFEDIPINE 30 MG
30 TABLET, EXTENDED RELEASE 24 HR ORAL DAILY
Refills: 0 | Status: DISCONTINUED | OUTPATIENT
Start: 2021-06-21 | End: 2021-06-21

## 2021-06-21 RX ADMIN — Medication 12 MILLIGRAM(S): at 16:18

## 2021-06-21 RX ADMIN — SODIUM CHLORIDE 125 MILLILITER(S): 9 INJECTION, SOLUTION INTRAVENOUS at 15:52

## 2021-06-21 RX ADMIN — Medication 10 MILLIGRAM(S): at 20:42

## 2021-06-21 RX ADMIN — Medication 216 GRAM(S): at 20:21

## 2021-06-21 RX ADMIN — Medication 10 MILLIGRAM(S): at 20:20

## 2021-06-21 RX ADMIN — Medication 10 MILLIGRAM(S): at 21:03

## 2021-06-21 NOTE — OB PROVIDER H&P - HISTORY OF PRESENT ILLNESS
39 yo  at [id number] (MONTSERRAT 21) sent in from the office with mildly elevated BPs to 140s/90s. Patient denies any h/o elevated BPs during pregnancy or prior to pregnancy. Denies HA, changes in vision, lightheadedness, CP, SOB, N/V, epigastric pain. PNC c/b elevated inhibin, AFP and IUGR 8%. She reports mild ctx. She was checked in the office and was told closed on exam. Denies LOF, VB and endorses good FM.     OBhx - SABx1, TOPx1 s/p D&C  GYNhx - denies  PMHx - hypothyroidism   PSHx - D&C   Meds - synthroid 50 QD  Allergies - NKDA  Social - denies tobacco, alcohol, recreational drugs  Psych - denies anxiety, depression  39 yo  at [id number] (MONTSERRAT 21) sent in from the office with mildly elevated BPs to 140s/90s. Patient denies any h/o elevated BPs during pregnancy or prior to pregnancy. Denies HA, changes in vision, lightheadedness, CP, SOB, N/V, epigastric pain. PNC c/b elevated inhibin, AFP and IUGR 8%. She reports mild ctx. She was checked in the office and was told closed on exam. Denies LOF, VB and endorses good FM.     EZEKIEL daigle (): breech,     OBhx - SABx1, TOPx1 s/p D&C  GYNhx - denies  PMHx - hypothyroidism   PSHx - D&C   Meds - synthroid 50 QD  Allergies - NKDA  Social - denies tobacco, alcohol, recreational drugs  Psych - denies anxiety, depression  39 yo  at [id number] (MONTSERRAT 21) sent in from the office with mildly elevated BPs to 140s/90s. Patient denies any h/o elevated BPs during pregnancy or prior to pregnancy. Denies HA, changes in vision, lightheadedness, CP, SOB, N/V, epigastric pain. PNC c/b elevated inhibin, AFP and IUGR 8%. She reports mild ctx. She was checked in the office and was told closed on exam. Denies LOF, VB and endorses good FM.     ATU (): breech, ant rt fundal, HEENA 22.43, EFW  (8%), dopplers wnl      OBhx - SABx1, TOPx1 s/p D&C  GYNhx - denies  PMHx - hypothyroidism   PSHx - D&C   Meds - synthroid 50 QD  Allergies - NKDA  Social - denies tobacco, alcohol, recreational drugs  Psych - denies anxiety, depression

## 2021-06-21 NOTE — OB PROVIDER H&P - NSHPLABSRESULTS_GEN_ALL_CORE
Vital Signs Last 24 Hrs  T(C): --  T(F): --  HR: 88 (21 Jun 2021 15:04) (78 - 89)  BP: 142/82 (21 Jun 2021 14:50) (142/82 - 158/89)  BP(mean): --  RR: --  SpO2: 99% (21 Jun 2021 15:04) (99% - 100%)

## 2021-06-21 NOTE — OB PROVIDER H&P - CURRENT PREGNANCY COMPLICATIONS, OB PROFILE
Abnormal Fetal Surveillance/Gestational Age less than 36 Weeks/Hypertensive Disorder/Intrauterine Growth Restriction

## 2021-06-21 NOTE — CHART NOTE - NSCHARTNOTEFT_GEN_A_CORE
MFM Not e    Patient is a 40 yr  @ 34 2/7 sent from the office secondary elevated blood pressure in 140s/90s/ On arrival to triage patient had multiple blood pressure that were elevated, the highest is 158/89.     Patient denies headache, change in vision, upper epigastric pain, nausea, vomiting, chest pain and shortness of breath.     Obhx; SAB x1 VTOP x 1   Pmhx: hypothyroid on synthroid     Elevated GCT, paneled for 2 weeks and normal      NST: 150 baseline, mod jackeline, + accels no decels   TOCO: rare     HELLP labs pending     ATU sonogram today: 2007g 8% centile normal Doppler       Assessment: 40 yr  @ 34 2/7 with gestational hypertension, no evidence of severe features. HELLP labs pending. Asymptomatic.   Fetal growth restriction      Recommendations    Admit to antepartum service to MFM Note    Patient is a 40 yr  @ 34 2/7 sent from the office secondary elevated blood pressure in 140s/90s/ On arrival to triage patient had multiple blood pressure that were elevated, the highest is 158/89.     Patient denies headache, change in vision, upper epigastric pain, nausea, vomiting, chest pain and shortness of breath.     Obhx; SAB x1 VTOP x 1   Pmhx: hypothyroid on synthroid     Elevated GCT, paneled for fingersticks for 2 weeks and normal      NST: 150 baseline, mod jackeline, + accels no decels   TOCO: rare     HELLP labs pending     ATU sonogram today: g 8% centile normal Doppler       Assessment: 40 yr  @ 34 2/7 with gestational hypertension, no evidence of severe features. HELLP labs pending. Asymptomatic.   Fetal growth restriction diagnosed today, 8% centile   Breech presentation    Recommendations    Admit to antepartum service to monitor for worsening sign and symptoms of preeclampsia, including blood pressure, HELLP labs and symptoms   Follow up HELLP labs and obtain 24 hours urine protein  Initiate betamethasone course  Obtain NICU consult   Will repeat sonogram tomorrow to assess fetal status   If patient develops severe features, including severe range blood pressure (systolic >160, diastolic >110), PEC symptoms, or abnormal HELLP labs, consider proceeding with delivery at that time   Mode of delivery will be via  delivery secondary to breech position   Recommend delivery at 37 weeks, or earlier if indicated     Seen and evaluated with Dr. Dickerson (M Attending)     Meredith Godoy MD   M Fellow MF Note    Patient is a 40 yr  @ 34 2/7 sent from the office secondary elevated blood pressure in 140s/90s/ On arrival to triage patient had multiple blood pressure that were elevated, the highest is 158/89.     Patient denies headache, change in vision, upper epigastric pain, nausea, vomiting, chest pain and shortness of breath.     Obhx; SAB x1 VTOP x 1   Pmhx: hypothyroid on synthroid     Elevated GCT, paneled for fingersticks for 2 weeks and normal      NST: 150 baseline, mod jackeline, + accels no decels   TOCO: rare     HELLP labs pending     ATU sonogram today: g 8% centile normal Doppler       Assessment: 40 yr  @ 34 2/7 with gestational hypertension, no evidence of severe features. HELLP labs pending. Asymptomatic.   Fetal growth restriction diagnosed today, 8% centile   Breech presentation    Recommendations    Admit to antepartum service to monitor for worsening sign and symptoms of preeclampsia, including blood pressure, HELLP labs and symptoms   Follow up HELLP labs and obtain 24 hours urine protein  Initiate betamethasone course  Obtain NICU consult   Will repeat sonogram tomorrow to assess fetal status   If patient develops severe features, including severe range blood pressure (systolic >160, diastolic >110), PEC symptoms, or abnormal HELLP labs, consider proceeding with delivery at that time   Mode of delivery will be via  delivery secondary to breech position   Recommend delivery at 37 weeks, or earlier if indicated     Seen and evaluated with Dr. Dickerson (M Attending)     Meredith Godoy MD   [facility] Fellow    [facility] Attending    [facility] consultation requested by Dr. Murphy to assist in the evaluation and management of this patient with new onset hypertension in pregnancy.    I evaluated and counseled Ms. Vásquez upon her presentation.  Agree with the assessment and plan above.      In summary Ms. Vásquez is a 39yo  @ 34 2/7 weeks sent from the office because of hypertension at her routine prenatal visit.  Her pregnancy has been complicated by multiple abnormal analytes as well as an elevated GCT but normal fingerstick paneling for two weeks.  In addition her office ultrasound today suggested IUGR at the 8th percentile.  Ms. Vásquez has no PEC sxs.  Hypertension with systolic as high as 158 documented in triage.  HELLP labs pending.      We recommended admission for evaluation of gestational hypertension vs preeclampsia to rule out severe features.  We also recommended  corticosteroids given risk of  delivery.  24hr urine to confirm diagnosis.  Current mode/timing of delivery:  for breech presentation at 37 weeks.

## 2021-06-21 NOTE — OB PROVIDER H&P - NSHPPHYSICALEXAM_GEN_ALL_CORE
Physical Exam:   General: sitting comfortably in bed, NAD   CV: RRR  Lungs: CTA b/l, good air flow b/l   Back: No CVA tenderness  Abd: soft, NTND, no rebound or guarding   Pelvic: closed/long/high   Ext: NT b/l, no edema    EFM: 140/mod jackeline/accels+/no decels  [place]: q3-5m   SVE: closed/long/high Physical Exam:   General: sitting comfortably in bed, NAD   CV: RRR  Lungs: CTA b/l, good air flow b/l   Back: No CVA tenderness  Abd: soft, NTND, no rebound or guarding   Pelvic: closed/long/high   Ext: NT b/l, no edema    EFM: 140/mod jackeline/accels+/no decels  [place]: q3-5m   SVE: closed/long/high    Bedside sono (6/21): breech, fundal placenta, HEENA 20.5, BPP 8/8

## 2021-06-21 NOTE — CHART NOTE - NSCHARTNOTEFT_GEN_A_CORE
R3 Chart note    S: Patient seen and examined at bedside. Noted to be hayley on monitor q2m, patient reports symptomatic contractions.     Vital Signs Last 24 Hrs  T(C): 37 (21 Jun 2021 16:30), Max: 37.0 (21 Jun 2021 14:20)  T(F): 98.6 (21 Jun 2021 16:30), Max: 98.6 (21 Jun 2021 14:20)  HR: 105 (21 Jun 2021 22:38) (75 - 115)  BP: 109/61 (21 Jun 2021 22:35) (104/56 - 159/89)  BP(mean): --  RR: 16 (21 Jun 2021 21:06) (16 - 18)  SpO2: 92% (21 Jun 2021 22:38) (92% - 100%)    FHT: 150, mod jackeline, +accels, no decels  [place]: q2m  SVE: 0/60/-3    A/P: r/o PTL, a/w r/o sPEC  - HELLP labs reviewed and wnl, P/C <0.3  - hayley symptomatically q2m, effacement softening, will monitor for 2 hours  - nifedpine and amp for PTL  - c/w BMZ #2 @ 420p 6/22    YOHANNESomnlexi PGY-3  d/w Dr. Murphy          ______________________________________________________________________________________________      R3 Addendum    Patient re-examined. Noted to be same exam.  Contractions symptomatically improved, patient feeling q10m.    FHT: 150, mod jackeline, no accels, no decels, currently sleep cycle  [place]: q2m    A/P:  - once reactive tracing, to 3KWN  - c/w nifedipine, amp and BMZ  - NST BID     d/w Dr. Katherine Bran PGY-3

## 2021-06-22 ENCOUNTER — ASOB RESULT (OUTPATIENT)
Age: 41
End: 2021-06-22

## 2021-06-22 ENCOUNTER — APPOINTMENT (OUTPATIENT)
Dept: ANTEPARTUM | Facility: CLINIC | Age: 41
End: 2021-06-22

## 2021-06-22 LAB
COVID-19 SPIKE DOMAIN AB INTERP: NEGATIVE — SIGNIFICANT CHANGE UP
COVID-19 SPIKE DOMAIN ANTIBODY RESULT: 0.4 U/ML — SIGNIFICANT CHANGE UP
GROUP B BETA STREP DNA (PCR): SIGNIFICANT CHANGE UP
GROUP B BETA STREP INTERPRETATION: SIGNIFICANT CHANGE UP
SARS-COV-2 IGG+IGM SERPL QL IA: 0.4 U/ML — SIGNIFICANT CHANGE UP
SARS-COV-2 IGG+IGM SERPL QL IA: NEGATIVE — SIGNIFICANT CHANGE UP
SOURCE GROUP B STREP: SIGNIFICANT CHANGE UP
T PALLIDUM AB TITR SER: NEGATIVE — SIGNIFICANT CHANGE UP

## 2021-06-22 PROCEDURE — 99231 SBSQ HOSP IP/OBS SF/LOW 25: CPT

## 2021-06-22 RX ORDER — SODIUM CHLORIDE 9 MG/ML
1000 INJECTION, SOLUTION INTRAVENOUS
Refills: 0 | Status: DISCONTINUED | OUTPATIENT
Start: 2021-06-22 | End: 2021-06-26

## 2021-06-22 RX ORDER — AMPICILLIN TRIHYDRATE 250 MG
1 CAPSULE ORAL EVERY 4 HOURS
Refills: 0 | Status: DISCONTINUED | OUTPATIENT
Start: 2021-06-22 | End: 2021-06-22

## 2021-06-22 RX ORDER — NIFEDIPINE 30 MG
10 TABLET, EXTENDED RELEASE 24 HR ORAL EVERY 6 HOURS
Refills: 0 | Status: DISCONTINUED | OUTPATIENT
Start: 2021-06-22 | End: 2021-06-22

## 2021-06-22 RX ORDER — NIFEDIPINE 30 MG
30 TABLET, EXTENDED RELEASE 24 HR ORAL DAILY
Refills: 0 | Status: DISCONTINUED | OUTPATIENT
Start: 2021-06-22 | End: 2021-06-22

## 2021-06-22 RX ADMIN — Medication 108 GRAM(S): at 10:15

## 2021-06-22 RX ADMIN — Medication 10 MILLIGRAM(S): at 03:06

## 2021-06-22 RX ADMIN — Medication 50 MICROGRAM(S): at 06:08

## 2021-06-22 RX ADMIN — Medication 108 GRAM(S): at 02:05

## 2021-06-22 RX ADMIN — Medication 12 MILLIGRAM(S): at 12:45

## 2021-06-22 RX ADMIN — Medication 108 GRAM(S): at 06:06

## 2021-06-22 NOTE — PROGRESS NOTE ADULT - ASSESSMENT
39 yo  at 34w3d (MONTSERRAT 21), now HD#2, a/w gHTN and concern for PEC due to elevated BP and PTL due to CTX in triage. PNC c/b IUGR, 8%.    #r/o sPEC  - BPs /61-74 overnight, receiving Procardia for [place]  - HELLP labs () wnl, P/C <0.3  - f/u 24-hr urine protein  - Continue to monitor BPs closely       #Fetal wellbeing  - Bedside sono (): breech, fundal placenta, HEENA 20.5, BPP /   - ATU (): breech, ant rt fundal, HEENA 22.43, EFW  (8%), dopplers wnl    - BMZ #1  @ 1615, 2nd dose today at 1615  - Procardia for [place]ysis  - F/u GBS     #Maternal wellbeing  - NPO, IVF   - PNV, folic acid  - Synthroid 50   - COVID neg ()     Tavon Ly, PGY2

## 2021-06-22 NOTE — PROGRESS NOTE ADULT - ASSESSMENT
39yo  at 34+3wks admitted for evaluation of PIH in setting of IUGR and  contractions. Now having non recurrent late dcels.   Pt is now beta complete, procadia (for tocolysis) now d/morelia since this AM, BPs have been NML. No s/sx pEC today.   IUGR - MFM sono shows EFW in 17% with HC not AC driving the size, low suspicion for iugr vs SGA?  Late Dcels - are nonrecurrent, and in between the FHRT is cat 1 with acels. Reviewed withFM. Will keep on monitor over night. No imminent plan for delivery at this time  Pt is for  2/2 breech.

## 2021-06-23 LAB
COLLECT DURATION TIME UR: 24 HR — SIGNIFICANT CHANGE UP
PROT 24H UR-MRATE: 192 MG/24 H — HIGH (ref 50–100)
TOTAL VOLUME - 24 HOUR: 2400 ML — SIGNIFICANT CHANGE UP
URINE CREATININE CALCULATION: 1 G/24 H — SIGNIFICANT CHANGE UP (ref 0.8–1.8)

## 2021-06-23 PROCEDURE — 99232 SBSQ HOSP IP/OBS MODERATE 35: CPT

## 2021-06-23 RX ORDER — LEVOTHYROXINE SODIUM 125 MCG
100 TABLET ORAL DAILY
Refills: 0 | Status: DISCONTINUED | OUTPATIENT
Start: 2021-06-25 | End: 2021-07-01

## 2021-06-23 RX ADMIN — Medication 50 MICROGRAM(S): at 05:56

## 2021-06-23 NOTE — PROGRESS NOTE ADULT - ASSESSMENT
Assessment: 40 yr  @ 34  with gestational hypertension, no evidence of severe features, currently being monitoring for intermittent deceleration on tracing. Overall low suspicion of fetal distress secondary to moderate variability and accels between rare decelerations.   HELLP labs normal   No PEC symptoms  Most recent growth 17% centile, with normal AC  s/p BMZ  -         Recommendations    Secondary to premature gestation and rare deceleration, would recommend to continue monitoring and not proceed with delivery. If decelerations remain rare in frequency can consider discontinuing fetal monitoring and considers transition to NST TID.   If decelerations are repetitive or there are other worsening signs of fetal well being including prolonged bradycardia or loss of variability, can consider proceeding with delivery at that time   Would recommend to monitor blood pressure until tomorrow after discontinuing procardia to determine if blood pressure are in severe range.   If blood pressure do become severe (systolic >160 and/or diastolic >110), patient develops PEC symptoms, or HELLP labs become abnormal, would recommend to proceed with delivery at that time .    Recommend delivery at 37 weeks, or earlier if indicated     Seen and evaluated with Dr. Dickerson (MFM Attending)     Meredith Godoy MD   M Fellow.

## 2021-06-23 NOTE — CHART NOTE - NSCHARTNOTEFT_GEN_A_CORE
R3 Tracing Note    Overnight, 1 noted deceleration at 630pm.     Vital Signs Last 24 Hrs  T(C): 36.4 (23 Jun 2021 01:08), Max: 36.8 (22 Jun 2021 05:41)  T(F): 97.52 (23 Jun 2021 01:08), Max: 98.24 (22 Jun 2021 13:14)  HR: 78 (23 Jun 2021 04:13) (67 - 99)  BP: 106/64 (23 Jun 2021 03:37) (99/61 - 132/63)  BP(mean): --  RR: 18 (22 Jun 2021 08:36) (18 - 18)  SpO2: 100% (23 Jun 2021 04:13) (90% - 100%)    FHT: 135, mod variability, +accels, no decels   [place]: q2-7m    A/P:  - continue monitoring overnight  - reassess to move back to 3KWN in AM    Plan per. Ahalobo  ADomney PGY-3

## 2021-06-23 NOTE — PROGRESS NOTE ADULT - ASSESSMENT
41 yo  at 34w4d (MONTSERRAT 21), now HD#3, a/w gHTN and concern for PEC due to elevated BP and PTL due to CTX in triage. PNC c/b IUGR, 8%.    #r/o sPEC  - BPs 102-132/62-69  - HELLP labs () wnl, P/C <0.3  - 24-hr urine protein: 192  - Continue to monitor BPs closely       #Fetal wellbeing  - ATU (): breech, ant fundal, EFW 2168 (17%)  - HC not AC driving the size, low suspicion for IUGR  - intermittent decels noted on EFM, cat 1 tracing in between, continue prolonged monitoring  - ATU (): breech, ant rt fundal, HEENA 22.43, EFW  (8%), dopplers wnl    - s/p BMZ   - F/u GBS     #Maternal wellbeing  - NPO, IVF   - PNV, folic acid  - Synthroid 50   - COVID neg ()     Tavon Ly, PGY2 39 yo  at 34w4d (MONTSERRAT 21), now HD#3, a/w gHTN and concern for PEC due to elevated BP and PTL due to CTX in triage. PNC c/b IUGR, 8%, with repeat MFM sono showing EFW 17% ().  Patient currently being monitored on L&D for noted intermittent decels in setting of otherwise category 1 tracing.    #r/o sPEC  - BPs 102-132/62-69  - HELLP labs () wnl, P/C <0.3  - 24-hr urine protein: 192  - Continue to monitor BPs closely       #Fetal wellbeing  - ATU (): breech, ant fundal, EFW 2168 (17%)  - HC not AC driving the size, low suspicion for IUGR  - intermittent decels noted on EFM, cat 1 tracing in between, continue prolonged monitoring  - ATU (): breech, ant rt fundal, HEENA 22.43, EFW  (8%), dopplers wnl    - s/p BMZ   - F/u GBS     #Maternal wellbeing  - NPO, IVF   - PNV, folic acid  - Synthroid 50   - COVID neg ()     Tavon Ly, PGY2

## 2021-06-24 ENCOUNTER — TRANSCRIPTION ENCOUNTER (OUTPATIENT)
Age: 41
End: 2021-06-24

## 2021-06-24 LAB
ALBUMIN SERPL ELPH-MCNC: 3.3 G/DL — SIGNIFICANT CHANGE UP (ref 3.3–5)
ALP SERPL-CCNC: 111 U/L — SIGNIFICANT CHANGE UP (ref 40–120)
ALT FLD-CCNC: 29 U/L — SIGNIFICANT CHANGE UP (ref 10–45)
ANION GAP SERPL CALC-SCNC: 11 MMOL/L — SIGNIFICANT CHANGE UP (ref 5–17)
APTT BLD: 19.9 SEC — LOW (ref 27.5–35.5)
AST SERPL-CCNC: 32 U/L — SIGNIFICANT CHANGE UP (ref 10–40)
BASOPHILS # BLD AUTO: 0.04 K/UL — SIGNIFICANT CHANGE UP (ref 0–0.2)
BASOPHILS NFR BLD AUTO: 0.3 % — SIGNIFICANT CHANGE UP (ref 0–2)
BILIRUB SERPL-MCNC: 0.2 MG/DL — SIGNIFICANT CHANGE UP (ref 0.2–1.2)
BLD GP AB SCN SERPL QL: NEGATIVE — SIGNIFICANT CHANGE UP
BUN SERPL-MCNC: 13 MG/DL — SIGNIFICANT CHANGE UP (ref 7–23)
CALCIUM SERPL-MCNC: 8.4 MG/DL — SIGNIFICANT CHANGE UP (ref 8.4–10.5)
CHLORIDE SERPL-SCNC: 105 MMOL/L — SIGNIFICANT CHANGE UP (ref 96–108)
CO2 SERPL-SCNC: 21 MMOL/L — LOW (ref 22–31)
CREAT SERPL-MCNC: 0.58 MG/DL — SIGNIFICANT CHANGE UP (ref 0.5–1.3)
EOSINOPHIL # BLD AUTO: 0 K/UL — SIGNIFICANT CHANGE UP (ref 0–0.5)
EOSINOPHIL NFR BLD AUTO: 0 % — SIGNIFICANT CHANGE UP (ref 0–6)
FIBRINOGEN PPP-MCNC: 396 MG/DL — SIGNIFICANT CHANGE UP (ref 290–520)
GLUCOSE SERPL-MCNC: 95 MG/DL — SIGNIFICANT CHANGE UP (ref 70–99)
HCT VFR BLD CALC: 32.6 % — LOW (ref 34.5–45)
HGB BLD-MCNC: 10.6 G/DL — LOW (ref 11.5–15.5)
IMM GRANULOCYTES NFR BLD AUTO: 1.7 % — HIGH (ref 0–1.5)
INR BLD: 0.87 RATIO — LOW (ref 0.88–1.16)
LDH SERPL L TO P-CCNC: 233 U/L — SIGNIFICANT CHANGE UP (ref 50–242)
LYMPHOCYTES # BLD AUTO: 18.6 % — SIGNIFICANT CHANGE UP (ref 13–44)
LYMPHOCYTES # BLD AUTO: 2.22 K/UL — SIGNIFICANT CHANGE UP (ref 1–3.3)
MCHC RBC-ENTMCNC: 30.5 PG — SIGNIFICANT CHANGE UP (ref 27–34)
MCHC RBC-ENTMCNC: 32.5 GM/DL — SIGNIFICANT CHANGE UP (ref 32–36)
MCV RBC AUTO: 93.9 FL — SIGNIFICANT CHANGE UP (ref 80–100)
MONOCYTES # BLD AUTO: 1.12 K/UL — HIGH (ref 0–0.9)
MONOCYTES NFR BLD AUTO: 9.4 % — SIGNIFICANT CHANGE UP (ref 2–14)
NEUTROPHILS # BLD AUTO: 8.34 K/UL — HIGH (ref 1.8–7.4)
NEUTROPHILS NFR BLD AUTO: 70 % — SIGNIFICANT CHANGE UP (ref 43–77)
NRBC # BLD: 0 /100 WBCS — SIGNIFICANT CHANGE UP (ref 0–0)
PLATELET # BLD AUTO: 260 K/UL — SIGNIFICANT CHANGE UP (ref 150–400)
POTASSIUM SERPL-MCNC: 3.7 MMOL/L — SIGNIFICANT CHANGE UP (ref 3.5–5.3)
POTASSIUM SERPL-SCNC: 3.7 MMOL/L — SIGNIFICANT CHANGE UP (ref 3.5–5.3)
PROT SERPL-MCNC: 6.4 G/DL — SIGNIFICANT CHANGE UP (ref 6–8.3)
PROTHROM AB SERPL-ACNC: 10.5 SEC — LOW (ref 10.6–13.6)
RBC # BLD: 3.47 M/UL — LOW (ref 3.8–5.2)
RBC # FLD: 12 % — SIGNIFICANT CHANGE UP (ref 10.3–14.5)
RH IG SCN BLD-IMP: POSITIVE — SIGNIFICANT CHANGE UP
SODIUM SERPL-SCNC: 137 MMOL/L — SIGNIFICANT CHANGE UP (ref 135–145)
URATE SERPL-MCNC: 4.7 MG/DL — SIGNIFICANT CHANGE UP (ref 2.5–7)
WBC # BLD: 11.92 K/UL — HIGH (ref 3.8–10.5)
WBC # FLD AUTO: 11.92 K/UL — HIGH (ref 3.8–10.5)

## 2021-06-24 PROCEDURE — 99231 SBSQ HOSP IP/OBS SF/LOW 25: CPT

## 2021-06-24 RX ORDER — HEPARIN SODIUM 5000 [USP'U]/ML
5000 INJECTION INTRAVENOUS; SUBCUTANEOUS EVERY 12 HOURS
Refills: 0 | Status: DISCONTINUED | OUTPATIENT
Start: 2021-06-24 | End: 2021-06-26

## 2021-06-24 RX ADMIN — Medication 1 TABLET(S): at 12:39

## 2021-06-24 RX ADMIN — HEPARIN SODIUM 5000 UNIT(S): 5000 INJECTION INTRAVENOUS; SUBCUTANEOUS at 18:14

## 2021-06-24 RX ADMIN — SODIUM CHLORIDE 3 MILLILITER(S): 9 INJECTION INTRAMUSCULAR; INTRAVENOUS; SUBCUTANEOUS at 22:32

## 2021-06-24 RX ADMIN — Medication 1 MILLIGRAM(S): at 12:39

## 2021-06-24 RX ADMIN — Medication 50 MICROGRAM(S): at 07:23

## 2021-06-24 NOTE — PROGRESS NOTE ADULT - ASSESSMENT
39 yo  at 34w5d (MONTSERRAT 21), now HD#4, a/w gHTN and concern for PEC due to elevated BP and PTL due to CTX in triage. PNC c/b IUGR, 8%, with repeat MFM sono showing EFW 17% ().  Patient with intermittent decels noted on HD#2-3.     #r/o sPEC  - BPs 121-146/63-82  - f/u AM HELLP labs  - HELLP labs () wnl, P/C <0.3  - 24-hr urine protein: 192  - Continue to monitor BPs closely       #Fetal wellbeing  - ATU (): breech, ant fundal, EFW 2168 (17%)  - HC not AC driving the size, low suspicion for IUGR  - ATU (): breech, ant rt fundal, HEENA 22.43, EFW 2007 (8%), dopplers wnl    - s/p BMZ   - F/u GBS     #Maternal wellbeing  - NPO, IVF   - PNV, folic acid  - Synthroid 50 (100 on Friday and Saturday)  - COVID neg ()     Tavon Ly, PGY2

## 2021-06-24 NOTE — PROGRESS NOTE ADULT - ASSESSMENT
Assessment: 40 yr  @ 34  with gestational hypertension, no evidence of severe features.   Patient was monitored yesterday secondary to rare decelerations noted on tracing however after prolonged monitoring, there wre no further decelraito after a 2horu period, as well as on overnight NST.   HELLP labs normal   No PEC symptoms  Most recent growth 17% centile, with normal AC  s/p BMZ  -         Recommendations    Secondary to premature gestation and episode of deceleration yesterday, would continue close fetal surveillance with NST TID   Continue to monitor blood pressure and for signs and symptoms of worsening preeclampsia   If blood pressure do become severe (systolic >160 and/or diastolic >110), patient develops PEC symptoms, or HELLP labs become abnormal, would recommend to proceed with delivery at that time .  Can consider if outpatient management after 1-2 days fetal surveillance shows no evidence of fetal distress and there is no evidence of worsening preeclampsia    Recommend delivery at 37 weeks, or earlier if indicated     Seen and evaluated with Dr. Dickerson (M Attending)     Meredith Godoy MD   M Fellow.

## 2021-06-25 ENCOUNTER — APPOINTMENT (OUTPATIENT)
Dept: ANTEPARTUM | Facility: CLINIC | Age: 41
End: 2021-06-25

## 2021-06-25 ENCOUNTER — ASOB RESULT (OUTPATIENT)
Age: 41
End: 2021-06-25

## 2021-06-25 PROBLEM — E03.9 HYPOTHYROIDISM, UNSPECIFIED: Chronic | Status: ACTIVE | Noted: 2021-06-21

## 2021-06-25 LAB
ALBUMIN SERPL ELPH-MCNC: 3.4 G/DL — SIGNIFICANT CHANGE UP (ref 3.3–5)
ALP SERPL-CCNC: 124 U/L — HIGH (ref 40–120)
ALT FLD-CCNC: 46 U/L — HIGH (ref 10–45)
ANION GAP SERPL CALC-SCNC: 14 MMOL/L — SIGNIFICANT CHANGE UP (ref 5–17)
APTT BLD: 26.1 SEC — LOW (ref 27.5–35.5)
AST SERPL-CCNC: 39 U/L — SIGNIFICANT CHANGE UP (ref 10–40)
BASOPHILS # BLD AUTO: 0.04 K/UL — SIGNIFICANT CHANGE UP (ref 0–0.2)
BASOPHILS NFR BLD AUTO: 0.4 % — SIGNIFICANT CHANGE UP (ref 0–2)
BILIRUB SERPL-MCNC: 0.1 MG/DL — LOW (ref 0.2–1.2)
BUN SERPL-MCNC: 12 MG/DL — SIGNIFICANT CHANGE UP (ref 7–23)
CALCIUM SERPL-MCNC: 9.1 MG/DL — SIGNIFICANT CHANGE UP (ref 8.4–10.5)
CHLORIDE SERPL-SCNC: 102 MMOL/L — SIGNIFICANT CHANGE UP (ref 96–108)
CO2 SERPL-SCNC: 21 MMOL/L — LOW (ref 22–31)
CREAT SERPL-MCNC: 0.65 MG/DL — SIGNIFICANT CHANGE UP (ref 0.5–1.3)
EOSINOPHIL # BLD AUTO: 0.06 K/UL — SIGNIFICANT CHANGE UP (ref 0–0.5)
EOSINOPHIL NFR BLD AUTO: 0.6 % — SIGNIFICANT CHANGE UP (ref 0–6)
FIBRINOGEN PPP-MCNC: 411 MG/DL — SIGNIFICANT CHANGE UP (ref 290–520)
GLUCOSE SERPL-MCNC: 101 MG/DL — HIGH (ref 70–99)
HCT VFR BLD CALC: 36.3 % — SIGNIFICANT CHANGE UP (ref 34.5–45)
HGB BLD-MCNC: 11.8 G/DL — SIGNIFICANT CHANGE UP (ref 11.5–15.5)
IMM GRANULOCYTES NFR BLD AUTO: 0.9 % — SIGNIFICANT CHANGE UP (ref 0–1.5)
INR BLD: 0.9 RATIO — SIGNIFICANT CHANGE UP (ref 0.88–1.16)
LDH SERPL L TO P-CCNC: 275 U/L — HIGH (ref 50–242)
LYMPHOCYTES # BLD AUTO: 2.44 K/UL — SIGNIFICANT CHANGE UP (ref 1–3.3)
LYMPHOCYTES # BLD AUTO: 24.1 % — SIGNIFICANT CHANGE UP (ref 13–44)
MCHC RBC-ENTMCNC: 30.3 PG — SIGNIFICANT CHANGE UP (ref 27–34)
MCHC RBC-ENTMCNC: 32.5 GM/DL — SIGNIFICANT CHANGE UP (ref 32–36)
MCV RBC AUTO: 93.3 FL — SIGNIFICANT CHANGE UP (ref 80–100)
MONOCYTES # BLD AUTO: 0.97 K/UL — HIGH (ref 0–0.9)
MONOCYTES NFR BLD AUTO: 9.6 % — SIGNIFICANT CHANGE UP (ref 2–14)
NEUTROPHILS # BLD AUTO: 6.52 K/UL — SIGNIFICANT CHANGE UP (ref 1.8–7.4)
NEUTROPHILS NFR BLD AUTO: 64.4 % — SIGNIFICANT CHANGE UP (ref 43–77)
NRBC # BLD: 0 /100 WBCS — SIGNIFICANT CHANGE UP (ref 0–0)
PLATELET # BLD AUTO: 273 K/UL — SIGNIFICANT CHANGE UP (ref 150–400)
POTASSIUM SERPL-MCNC: 3.9 MMOL/L — SIGNIFICANT CHANGE UP (ref 3.5–5.3)
POTASSIUM SERPL-SCNC: 3.9 MMOL/L — SIGNIFICANT CHANGE UP (ref 3.5–5.3)
PROT SERPL-MCNC: 6.9 G/DL — SIGNIFICANT CHANGE UP (ref 6–8.3)
PROTHROM AB SERPL-ACNC: 10.8 SEC — SIGNIFICANT CHANGE UP (ref 10.6–13.6)
RBC # BLD: 3.89 M/UL — SIGNIFICANT CHANGE UP (ref 3.8–5.2)
RBC # FLD: 11.9 % — SIGNIFICANT CHANGE UP (ref 10.3–14.5)
SODIUM SERPL-SCNC: 137 MMOL/L — SIGNIFICANT CHANGE UP (ref 135–145)
URATE SERPL-MCNC: 4.5 MG/DL — SIGNIFICANT CHANGE UP (ref 2.5–7)
WBC # BLD: 10.12 K/UL — SIGNIFICANT CHANGE UP (ref 3.8–10.5)
WBC # FLD AUTO: 10.12 K/UL — SIGNIFICANT CHANGE UP (ref 3.8–10.5)

## 2021-06-25 PROCEDURE — 99232 SBSQ HOSP IP/OBS MODERATE 35: CPT

## 2021-06-25 PROCEDURE — 59510 CESAREAN DELIVERY: CPT

## 2021-06-25 PROCEDURE — 88307 TISSUE EXAM BY PATHOLOGIST: CPT | Mod: 26

## 2021-06-25 RX ORDER — MAGNESIUM SULFATE 500 MG/ML
2 VIAL (ML) INJECTION
Qty: 40 | Refills: 0 | Status: DISCONTINUED | OUTPATIENT
Start: 2021-06-25 | End: 2021-06-26

## 2021-06-25 RX ORDER — NALOXONE HYDROCHLORIDE 4 MG/.1ML
0.1 SPRAY NASAL
Refills: 0 | Status: DISCONTINUED | OUTPATIENT
Start: 2021-06-25 | End: 2021-07-01

## 2021-06-25 RX ORDER — OXYCODONE HYDROCHLORIDE 5 MG/1
5 TABLET ORAL
Refills: 0 | Status: DISCONTINUED | OUTPATIENT
Start: 2021-06-25 | End: 2021-07-01

## 2021-06-25 RX ORDER — HYDROMORPHONE HYDROCHLORIDE 2 MG/ML
1 INJECTION INTRAMUSCULAR; INTRAVENOUS; SUBCUTANEOUS
Refills: 0 | Status: DISCONTINUED | OUTPATIENT
Start: 2021-06-25 | End: 2021-07-01

## 2021-06-25 RX ORDER — MAGNESIUM SULFATE 500 MG/ML
4 VIAL (ML) INJECTION ONCE
Refills: 0 | Status: DISCONTINUED | OUTPATIENT
Start: 2021-06-25 | End: 2021-06-26

## 2021-06-25 RX ORDER — OXYCODONE HYDROCHLORIDE 5 MG/1
10 TABLET ORAL
Refills: 0 | Status: DISCONTINUED | OUTPATIENT
Start: 2021-06-25 | End: 2021-07-01

## 2021-06-25 RX ORDER — NALBUPHINE HYDROCHLORIDE 10 MG/ML
2.5 INJECTION, SOLUTION INTRAMUSCULAR; INTRAVENOUS; SUBCUTANEOUS EVERY 6 HOURS
Refills: 0 | Status: DISCONTINUED | OUTPATIENT
Start: 2021-06-25 | End: 2021-07-01

## 2021-06-25 RX ORDER — MORPHINE SULFATE 50 MG/1
0.1 CAPSULE, EXTENDED RELEASE ORAL ONCE
Refills: 0 | Status: DISCONTINUED | OUTPATIENT
Start: 2021-06-25 | End: 2021-06-27

## 2021-06-25 RX ORDER — ONDANSETRON 8 MG/1
4 TABLET, FILM COATED ORAL EVERY 6 HOURS
Refills: 0 | Status: DISCONTINUED | OUTPATIENT
Start: 2021-06-25 | End: 2021-07-01

## 2021-06-25 RX ADMIN — Medication 1 TABLET(S): at 12:18

## 2021-06-25 RX ADMIN — Medication 1 MILLIGRAM(S): at 12:18

## 2021-06-25 RX ADMIN — Medication 100 MICROGRAM(S): at 06:07

## 2021-06-25 RX ADMIN — HEPARIN SODIUM 5000 UNIT(S): 5000 INJECTION INTRAVENOUS; SUBCUTANEOUS at 06:07

## 2021-06-25 RX ADMIN — SODIUM CHLORIDE 3 MILLILITER(S): 9 INJECTION INTRAMUSCULAR; INTRAVENOUS; SUBCUTANEOUS at 06:23

## 2021-06-25 RX ADMIN — HEPARIN SODIUM 5000 UNIT(S): 5000 INJECTION INTRAVENOUS; SUBCUTANEOUS at 18:08

## 2021-06-25 NOTE — PROGRESS NOTE ADULT - TIME BILLING
Gestational hypertension r/o severe disease
Gestational hypertension
Gestational hypertension, fetal heart rate decelerations
gestational hypertension

## 2021-06-25 NOTE — PROGRESS NOTE ADULT - ASSESSMENT
Assessment: 40 yr  @ 34  with gestational hypertension, no evidence of severe features. Isolated elevated blood pressure today with repeat in mild range. Asymptomatic.   NST shows no evidence of fetal distress   HELLP labs normal   No PEC symptoms  Most recent growth 17% centile, with normal AC  s/p BMZ  -         Recommendations    Secondary to premature gestation and episode of deceleration yesterday, would continue close fetal surveillance with NST TID   Continue to monitor blood pressure and for signs and symptoms of worsening preeclampsia   If blood pressure do become severe (systolic >160 and/or diastolic >110), patient develops PEC symptoms, or HELLP labs become abnormal, would recommend to proceed with delivery at that time .  Continue to monitor inpatient secondary to elevated blood pressure today.  Consider repeating HELLP labs today   Can decrease fetal monitoring to NST BID    Recommend delivery at 37 weeks, or earlier if indicated     Seen and evaluated with Dr. Dickerson (MFM Attending)     Meredith Godoy MD   M Fellow.

## 2021-06-25 NOTE — PROGRESS NOTE ADULT - ASSESSMENT
39 yo  at 34w6d (MONTSERRAT 21), now HD#5, a/w gHTN and concern for PEC due to elevated BP and PTL due to CTX in triage. PNC c/b IUGR, 8%, with repeat MFM sono showing EFW 17% ().  Patient with intermittent decels noted on HD#2-3.     #r/o sPEC  - HELLP labs () wnl, P/C <0.3  - 24-hr urine protein: 192  - Continue to monitor BPs closely       #Fetal wellbeing  - ATU (): breech, ant fundal, EFW 2168 (17%)  - HC not AC driving the size, low suspicion for IUGR  - ATU (): breech, ant rt fundal, HEENA 22.43, EFW  (8%), dopplers wnl    - s/p BMZ   - F/u GBS     #Maternal wellbeing  - Reg, IVF   - PNV, folic acid  - Synthroid 50 (100 on Friday and Saturday)  - COVID neg ()     Tavon Ly, PGY2

## 2021-06-25 NOTE — OB PROVIDER LABOR PROGRESS NOTE - NS_SUBJECTIVE/OBJECTIVE_OBGYN_ALL_OB_FT
elevated bps- severe range with no sxs at 34 and 5/7 wks- no ha, blurry viz, n/v or epigastric pain
pt comfortable, denies ha, blurry viz, n/v or epigastric pain, doesn't really feel ctx
39 yo  at 34 and 2/7 wks with h/o elevated afp and inhibin A, presented from office with elevated bps and ctx q 2-3 that she doesn't feel. breech baby in 8th percentile. no h/0 ha, blurry vision, epigastric pain or n/v. pt has no sig pmh, all, meds, psh or social hx. cervix was closed in the office

## 2021-06-25 NOTE — PROVIDER CONTACT NOTE (OTHER) - SITUATION
Antepartum pt 34.6 here for PTL/PEC, pt has had elevated BP throughout day. Change of shift /83. Pt denies symptoms of elevated BP. Antepartum pt 34.6 here for PTL/PEC, pt has had elevated BP throughout day. Change of shift /96. Pt denies symptoms of elevated BP. Pt elavated Bp throughout shifts most mluhmz=916/93

## 2021-06-25 NOTE — OB PROVIDER LABOR PROGRESS NOTE - ASSESSMENT
39 yo  at 34 and 2/7 wks with HTN, labs all normal x p/c ratio .3, ctx with closed cx but 60% effaced per resident. labs and clinical situation reviewed in detail with pt and  who express understanding and agree and were given ample time for q and a.  steroids, efm, procardia for ctx, follow bps and pih labs, explained that if delivery needs to happen would be c-s as baby is breech. will follow closely
41 yo  at 34 and 2/7 wks breech with htn, no sxs r/o PIH, abnormal analytes in pg and ctxs- not feeling them  plan- PIH labs, monitor BPS, steroids now and in 24 hours for fetal lung maturity- all options and rba reviewed- await labs and follow up BPs to decide on timing of delivery and management
41 yo  at 34 and 6/7 wks s/p steroids with HTN, now severe range persistently, breech, for - all options and rba reviewed. pt had heparin 4 plus hours ago- had extensive d/w anesthesia MD while we were both in room with pt and he reviewed all risks benefits and alternatives of spinal vs GA- pt ate 3 hours ago and heparin given more than 4 hours ago- will proceed with spinal and anesthesia has extensively counseled pt- then after c-s will start mag

## 2021-06-25 NOTE — CHART NOTE - NSCHARTNOTEFT_GEN_A_CORE
Hypertensive Note    41 yo  @ 34w6d has been monitored in house for r/o sPEC and PTL, w/ a diagnosis of gHTN, P/C <0.3, HELLP wnl. Now qualifies for severe preeclampsia by blood pressures. Denies HA, SOB, CP, RUQ/epigastric pain, b/l swelling LE and UE, or vision changes.       O:   Vitals:  T(C): 36.6 (21 @ 20:50), Max: 37.1 (21 @ 17:44)  HR: 64 (21 @ 21:05) (64 - 116)  BP: 158/93 (21 @ 21:05) (120/68 - 169/96)  RR: 18 (21 @ 20:50) (16 - 18)  SpO2: 99% (21 @ 20:50) (97% - 100%)    PE:   Gen NAD, aox3  CV RRR, no m/r/g  Lungs CTAB  Abd nontender, gravid  Ext nonedematous, nontender    Labs:                        11.8   10.12 )-----------( 273      ( 2021 18:24 )             36.3         137    |  102    |  12     ----------------------------<  101<H>  3.9     |  21<L>  |  0.65     Ca    9.1        2021 18:24    TPro  6.9    /  Alb  3.4    /  TBili  0.1<L>  /  DBili  x      /  AST  39     /  ALT  46<H>  /  AlkPhos  124<H>          PT/INR - ( 2021 18:24 )   PT: 10.8 sec;   INR: 0.90 ratio         PTT - ( 2021 18:24 )  PTT:26.1 sec          A/P:  41 yo  @ 34w6d now w/ sPEC by BP, Mg to be started, patient to be delivered as >34w, by pLT 2/2 breech.     -Initiate Magnesium loading dose  - Magnesium level q 6 hours  -Calcium gluconate 1g IV to be given over 10 min for  Magnesium toxicity   - Antihypertensive IV med if SBP>/= 160 or DBP >/=110  -UOP  to be followed closely  - Monitor BP's closely  - last labs at 615p    seen and evaluated with Dr. Murphy, Attending  Anesthesia Attending, Dr. Rangel and CRNA at bedside  AD[place] PGY-3

## 2021-06-25 NOTE — PROVIDER CONTACT NOTE (OTHER) - SITUATION
Antepartum pt 34.6 here for PTL/PEC, pt has had elevated BP throughout day. Change of shift /96. Pt denies symptoms of elevated BP.

## 2021-06-25 NOTE — PROVIDER CONTACT NOTE (OTHER) - SITUATION
Antepartum pt 34.6 here for PTL/PEC, pt has had elevated BP throughout day. Change of shift /83. Pt denies symptoms of elevated BP. Antepartum pt 34.6 here for PTL/PEC, pt has had elevated BP throughout day. Change of shift /96. Pt denies symptoms of elevated BP.

## 2021-06-25 NOTE — PROVIDER CONTACT NOTE (OTHER) - SITUATION
Antepartum pt 34.6 here for PTL/PEC, pt has had elevated BP throughout day. Change of shift /83. Pt denies symptoms of elevated BP. repeated BP throughout shift most recent is 158/93

## 2021-06-26 LAB
ALBUMIN SERPL ELPH-MCNC: 3.1 G/DL — LOW (ref 3.3–5)
ALP SERPL-CCNC: 106 U/L — SIGNIFICANT CHANGE UP (ref 40–120)
ALT FLD-CCNC: 41 U/L — SIGNIFICANT CHANGE UP (ref 10–45)
ANION GAP SERPL CALC-SCNC: 15 MMOL/L — SIGNIFICANT CHANGE UP (ref 5–17)
APTT BLD: 26.3 SEC — LOW (ref 27.5–35.5)
AST SERPL-CCNC: 35 U/L — SIGNIFICANT CHANGE UP (ref 10–40)
BASOPHILS # BLD AUTO: 0.01 K/UL — SIGNIFICANT CHANGE UP (ref 0–0.2)
BASOPHILS NFR BLD AUTO: 0.1 % — SIGNIFICANT CHANGE UP (ref 0–2)
BILIRUB SERPL-MCNC: 0.1 MG/DL — LOW (ref 0.2–1.2)
BUN SERPL-MCNC: 11 MG/DL — SIGNIFICANT CHANGE UP (ref 7–23)
CALCIUM SERPL-MCNC: 7.7 MG/DL — LOW (ref 8.4–10.5)
CHLORIDE SERPL-SCNC: 101 MMOL/L — SIGNIFICANT CHANGE UP (ref 96–108)
CO2 SERPL-SCNC: 19 MMOL/L — LOW (ref 22–31)
CREAT SERPL-MCNC: 0.51 MG/DL — SIGNIFICANT CHANGE UP (ref 0.5–1.3)
EOSINOPHIL # BLD AUTO: 0.01 K/UL — SIGNIFICANT CHANGE UP (ref 0–0.5)
EOSINOPHIL NFR BLD AUTO: 0.1 % — SIGNIFICANT CHANGE UP (ref 0–6)
FIBRINOGEN PPP-MCNC: 384 MG/DL — SIGNIFICANT CHANGE UP (ref 290–520)
GLUCOSE SERPL-MCNC: 120 MG/DL — HIGH (ref 70–99)
HCT VFR BLD CALC: 34.2 % — LOW (ref 34.5–45)
HGB BLD-MCNC: 11.4 G/DL — LOW (ref 11.5–15.5)
IMM GRANULOCYTES NFR BLD AUTO: 0.5 % — SIGNIFICANT CHANGE UP (ref 0–1.5)
INR BLD: 0.83 RATIO — LOW (ref 0.88–1.16)
LDH SERPL L TO P-CCNC: 360 U/L — HIGH (ref 50–242)
LYMPHOCYTES # BLD AUTO: 1.48 K/UL — SIGNIFICANT CHANGE UP (ref 1–3.3)
LYMPHOCYTES # BLD AUTO: 8.5 % — LOW (ref 13–44)
MAGNESIUM SERPL-MCNC: 5 MG/DL — HIGH (ref 1.6–2.6)
MAGNESIUM SERPL-MCNC: 5.8 MG/DL — HIGH (ref 1.6–2.6)
MAGNESIUM SERPL-MCNC: 6.4 MG/DL — HIGH (ref 1.6–2.6)
MCHC RBC-ENTMCNC: 30.8 PG — SIGNIFICANT CHANGE UP (ref 27–34)
MCHC RBC-ENTMCNC: 33.3 GM/DL — SIGNIFICANT CHANGE UP (ref 32–36)
MCV RBC AUTO: 92.4 FL — SIGNIFICANT CHANGE UP (ref 80–100)
MONOCYTES # BLD AUTO: 1.1 K/UL — HIGH (ref 0–0.9)
MONOCYTES NFR BLD AUTO: 6.3 % — SIGNIFICANT CHANGE UP (ref 2–14)
NEUTROPHILS # BLD AUTO: 14.79 K/UL — HIGH (ref 1.8–7.4)
NEUTROPHILS NFR BLD AUTO: 84.5 % — HIGH (ref 43–77)
NRBC # BLD: 0 /100 WBCS — SIGNIFICANT CHANGE UP (ref 0–0)
PLATELET # BLD AUTO: 247 K/UL — SIGNIFICANT CHANGE UP (ref 150–400)
POTASSIUM SERPL-MCNC: 3.9 MMOL/L — SIGNIFICANT CHANGE UP (ref 3.5–5.3)
POTASSIUM SERPL-SCNC: 3.9 MMOL/L — SIGNIFICANT CHANGE UP (ref 3.5–5.3)
PROT SERPL-MCNC: 6.1 G/DL — SIGNIFICANT CHANGE UP (ref 6–8.3)
PROTHROM AB SERPL-ACNC: 10.1 SEC — LOW (ref 10.6–13.6)
RBC # BLD: 3.7 M/UL — LOW (ref 3.8–5.2)
RBC # FLD: 11.7 % — SIGNIFICANT CHANGE UP (ref 10.3–14.5)
SODIUM SERPL-SCNC: 135 MMOL/L — SIGNIFICANT CHANGE UP (ref 135–145)
URATE SERPL-MCNC: 4.2 MG/DL — SIGNIFICANT CHANGE UP (ref 2.5–7)
WBC # BLD: 17.47 K/UL — HIGH (ref 3.8–10.5)
WBC # FLD AUTO: 17.47 K/UL — HIGH (ref 3.8–10.5)

## 2021-06-26 RX ORDER — MAGNESIUM HYDROXIDE 400 MG/1
30 TABLET, CHEWABLE ORAL
Refills: 0 | Status: DISCONTINUED | OUTPATIENT
Start: 2021-06-26 | End: 2021-07-01

## 2021-06-26 RX ORDER — HEPARIN SODIUM 5000 [USP'U]/ML
5000 INJECTION INTRAVENOUS; SUBCUTANEOUS EVERY 12 HOURS
Refills: 0 | Status: DISCONTINUED | OUTPATIENT
Start: 2021-06-26 | End: 2021-07-01

## 2021-06-26 RX ORDER — OXYCODONE HYDROCHLORIDE 5 MG/1
5 TABLET ORAL
Refills: 0 | Status: DISCONTINUED | OUTPATIENT
Start: 2021-06-26 | End: 2021-07-01

## 2021-06-26 RX ORDER — LANOLIN
1 OINTMENT (GRAM) TOPICAL EVERY 6 HOURS
Refills: 0 | Status: DISCONTINUED | OUTPATIENT
Start: 2021-06-26 | End: 2021-07-01

## 2021-06-26 RX ORDER — SODIUM CHLORIDE 9 MG/ML
1000 INJECTION, SOLUTION INTRAVENOUS
Refills: 0 | Status: DISCONTINUED | OUTPATIENT
Start: 2021-06-26 | End: 2021-07-01

## 2021-06-26 RX ORDER — IBUPROFEN 200 MG
600 TABLET ORAL EVERY 6 HOURS
Refills: 0 | Status: COMPLETED | OUTPATIENT
Start: 2021-06-26 | End: 2022-05-25

## 2021-06-26 RX ORDER — MAGNESIUM SULFATE 500 MG/ML
4 VIAL (ML) INJECTION ONCE
Refills: 0 | Status: COMPLETED | OUTPATIENT
Start: 2021-06-26 | End: 2021-06-26

## 2021-06-26 RX ORDER — MAGNESIUM SULFATE 500 MG/ML
2 VIAL (ML) INJECTION
Qty: 40 | Refills: 0 | Status: DISCONTINUED | OUTPATIENT
Start: 2021-06-26 | End: 2021-06-26

## 2021-06-26 RX ORDER — TETANUS TOXOID, REDUCED DIPHTHERIA TOXOID AND ACELLULAR PERTUSSIS VACCINE, ADSORBED 5; 2.5; 8; 8; 2.5 [IU]/.5ML; [IU]/.5ML; UG/.5ML; UG/.5ML; UG/.5ML
0.5 SUSPENSION INTRAMUSCULAR ONCE
Refills: 0 | Status: DISCONTINUED | OUTPATIENT
Start: 2021-06-26 | End: 2021-07-01

## 2021-06-26 RX ORDER — KETOROLAC TROMETHAMINE 30 MG/ML
30 SYRINGE (ML) INJECTION EVERY 6 HOURS
Refills: 0 | Status: DISCONTINUED | OUTPATIENT
Start: 2021-06-26 | End: 2021-06-27

## 2021-06-26 RX ORDER — MAGNESIUM SULFATE 500 MG/ML
2 VIAL (ML) INJECTION
Qty: 40 | Refills: 0 | Status: DISCONTINUED | OUTPATIENT
Start: 2021-06-26 | End: 2021-06-27

## 2021-06-26 RX ORDER — DIPHENHYDRAMINE HCL 50 MG
25 CAPSULE ORAL EVERY 6 HOURS
Refills: 0 | Status: DISCONTINUED | OUTPATIENT
Start: 2021-06-26 | End: 2021-07-01

## 2021-06-26 RX ORDER — OXYCODONE HYDROCHLORIDE 5 MG/1
5 TABLET ORAL ONCE
Refills: 0 | Status: DISCONTINUED | OUTPATIENT
Start: 2021-06-26 | End: 2021-07-01

## 2021-06-26 RX ORDER — ACETAMINOPHEN 500 MG
975 TABLET ORAL
Refills: 0 | Status: DISCONTINUED | OUTPATIENT
Start: 2021-06-26 | End: 2021-07-01

## 2021-06-26 RX ORDER — SIMETHICONE 80 MG/1
80 TABLET, CHEWABLE ORAL EVERY 4 HOURS
Refills: 0 | Status: DISCONTINUED | OUTPATIENT
Start: 2021-06-26 | End: 2021-07-01

## 2021-06-26 RX ORDER — OXYTOCIN 10 UNIT/ML
333.33 VIAL (ML) INJECTION
Qty: 20 | Refills: 0 | Status: DISCONTINUED | OUTPATIENT
Start: 2021-06-26 | End: 2021-07-01

## 2021-06-26 RX ADMIN — NALBUPHINE HYDROCHLORIDE 2.5 MILLIGRAM(S): 10 INJECTION, SOLUTION INTRAMUSCULAR; INTRAVENOUS; SUBCUTANEOUS at 11:43

## 2021-06-26 RX ADMIN — Medication 975 MILLIGRAM(S): at 15:28

## 2021-06-26 RX ADMIN — Medication 975 MILLIGRAM(S): at 16:20

## 2021-06-26 RX ADMIN — Medication 100 MICROGRAM(S): at 05:54

## 2021-06-26 RX ADMIN — HEPARIN SODIUM 5000 UNIT(S): 5000 INJECTION INTRAVENOUS; SUBCUTANEOUS at 21:35

## 2021-06-26 RX ADMIN — Medication 30 MILLIGRAM(S): at 12:10

## 2021-06-26 RX ADMIN — Medication 975 MILLIGRAM(S): at 10:26

## 2021-06-26 RX ADMIN — Medication 30 MILLIGRAM(S): at 18:32

## 2021-06-26 RX ADMIN — Medication 50 GM/HR: at 00:48

## 2021-06-26 RX ADMIN — Medication 200 GRAM(S): at 00:20

## 2021-06-26 RX ADMIN — Medication 975 MILLIGRAM(S): at 21:35

## 2021-06-26 RX ADMIN — Medication 975 MILLIGRAM(S): at 09:27

## 2021-06-26 RX ADMIN — NALBUPHINE HYDROCHLORIDE 2.5 MILLIGRAM(S): 10 INJECTION, SOLUTION INTRAMUSCULAR; INTRAVENOUS; SUBCUTANEOUS at 12:15

## 2021-06-26 RX ADMIN — HEPARIN SODIUM 5000 UNIT(S): 5000 INJECTION INTRAVENOUS; SUBCUTANEOUS at 09:28

## 2021-06-26 RX ADMIN — Medication 30 MILLIGRAM(S): at 18:11

## 2021-06-26 RX ADMIN — Medication 975 MILLIGRAM(S): at 22:05

## 2021-06-26 RX ADMIN — Medication 30 MILLIGRAM(S): at 11:43

## 2021-06-26 NOTE — OB PROVIDER DELIVERY SUMMARY - NSSELHIDDEN_OBGYN_ALL_OB_FT
[NS_DeliveryAttending1_OBGYN_ALL_OB_FT:[id number]],[NS_DeliveryAssist1_OBGYN_ALL_OB_FT:[id number]=]

## 2021-06-26 NOTE — OB NEONATOLOGY/PEDIATRICIAN DELIVERY SUMMARY - NSPEDSNEONOTESA_OBGYN_ALL_OB_FT
Baby Fahad is a 34 6/7 weeks gestation female born via C/S to a 40 year old  for worsening pre eclampsia and breech presentation. Prenatal labs neg, NR and immune, GBS neg, blood type O pos. Maternal history of hypothyroidism on Synthroid. Prenatal course complicated by elevated inhibin, AFP and IUGR 8%. Received BMZ on - .  AROM at delivery with clear fluid, infant emerged breech. Warmed, dried, stimulated and suctioned. Infant apneic after suctioning requiring PPV 20/5 for approx 15 seconds with spontaneous cry noted. Transitioned to CPAP 5 for retractions. Infant transferred to NICU on CPAP 5 21% for further evaluation and management of prematurity and respiratory distress.

## 2021-06-26 NOTE — PROGRESS NOTE ADULT - ASSESSMENT
A/P: 39yo POD#1 s/p pLTCS 2/2 breech presentation c/b sPEC requiring Mg @ 34w6d. -130/70s after case. Patient is stable and doing well post-operatively.      #sPEC  - Mag x24 for seizure ppx  - consider PO antihypertensives if BP moderate range  - AM HELLP, repeat PRN  - BP and symptom monitoring  - Cardio OB email to be sent    #PP care  - Continue regular diet.  - Increase ambulation.  - Continue motrin, tylenol, oxycodone PRN for pain control.   - HSQ, venodynes for dvt ppx    Ceci Yousif, PGY-3

## 2021-06-26 NOTE — OB RN DELIVERY SUMMARY - NS_SEPSISRSKCALC_OBGYN_ALL_OB_FT
EOS calculated successfully. EOS Risk Factor: 0.5/1000 live births ([facility] national incidence); GA=34w6d; Temp=98.42; ROM=0; GBS='Unknown'; Antibiotics='No antibiotics or any antibiotics < 2 hrs prior to birth'

## 2021-06-26 NOTE — OB RN DELIVERY SUMMARY - NSSELHIDDEN_OBGYN_ALL_OB_FT
[NS_DeliveryAttending1_OBGYN_ALL_OB_FT:[id number]],[NS_DeliveryAssist1_OBGYN_ALL_OB_FT:[id number]],[NS_DeliveryRN_OBGYN_ALL_OB_FT:[id number]=]

## 2021-06-26 NOTE — OB PROVIDER DELIVERY SUMMARY - NSPROVIDERDELIVERYNOTE_OBGYN_ALL_OB_FT
unscheduled, urgent pLTCS for sPEC requiring Mg, breech presentation  Viable F infant, apgars 7/8, weight ****g  Hysterotomy closed in 1 layer using chromic  Surgicel powder placed over hysterotomy and bladder flap  Uterus noted to be bulky, enlarged likely fibrotic; tubes, and ovaries  Abdomen closed in standard fashion  Pt to recovery in stable condition  Infant to NICU @ 34w6d  Placenta to pathology  EBL: 500   QBL: 350    IVF: 1L    UOP: 150  ADomney PGY-3

## 2021-06-26 NOTE — OB RN INTRAOPERATIVE NOTE - NSSELHIDDEN_OBGYN_ALL_OB_FT
[NS_DeliveryAttending1_OBGYN_ALL_OB_FT:[id number]],[NS_DeliveryAssist1_OBGYN_ALL_OB_FT:[id number]],[NS_DeliveryRN_OBGYN_ALL_OB_FT:[id number]=]

## 2021-06-27 LAB
ALBUMIN SERPL ELPH-MCNC: 2.6 G/DL — LOW (ref 3.3–5)
ALP SERPL-CCNC: 92 U/L — SIGNIFICANT CHANGE UP (ref 40–120)
ALT FLD-CCNC: 32 U/L — SIGNIFICANT CHANGE UP (ref 10–45)
ANION GAP SERPL CALC-SCNC: 11 MMOL/L — SIGNIFICANT CHANGE UP (ref 5–17)
ANION GAP SERPL CALC-SCNC: 13 MMOL/L — SIGNIFICANT CHANGE UP (ref 5–17)
APTT BLD: 27.6 SEC — SIGNIFICANT CHANGE UP (ref 27.5–35.5)
AST SERPL-CCNC: 29 U/L — SIGNIFICANT CHANGE UP (ref 10–40)
BASOPHILS # BLD AUTO: 0.03 K/UL — SIGNIFICANT CHANGE UP (ref 0–0.2)
BASOPHILS NFR BLD AUTO: 0.3 % — SIGNIFICANT CHANGE UP (ref 0–2)
BILIRUB SERPL-MCNC: 0.1 MG/DL — LOW (ref 0.2–1.2)
BUN SERPL-MCNC: 14 MG/DL — SIGNIFICANT CHANGE UP (ref 7–23)
BUN SERPL-MCNC: 15 MG/DL — SIGNIFICANT CHANGE UP (ref 7–23)
CALCIUM SERPL-MCNC: 6.9 MG/DL — LOW (ref 8.4–10.5)
CALCIUM SERPL-MCNC: 7.4 MG/DL — LOW (ref 8.4–10.5)
CHLORIDE SERPL-SCNC: 95 MMOL/L — LOW (ref 96–108)
CHLORIDE SERPL-SCNC: 98 MMOL/L — SIGNIFICANT CHANGE UP (ref 96–108)
CO2 SERPL-SCNC: 19 MMOL/L — LOW (ref 22–31)
CO2 SERPL-SCNC: 21 MMOL/L — LOW (ref 22–31)
CREAT SERPL-MCNC: 0.67 MG/DL — SIGNIFICANT CHANGE UP (ref 0.5–1.3)
CREAT SERPL-MCNC: 0.86 MG/DL — SIGNIFICANT CHANGE UP (ref 0.5–1.3)
EOSINOPHIL # BLD AUTO: 0.13 K/UL — SIGNIFICANT CHANGE UP (ref 0–0.5)
EOSINOPHIL NFR BLD AUTO: 1.2 % — SIGNIFICANT CHANGE UP (ref 0–6)
FIBRINOGEN PPP-MCNC: 543 MG/DL — HIGH (ref 290–520)
GLUCOSE SERPL-MCNC: 85 MG/DL — SIGNIFICANT CHANGE UP (ref 70–99)
GLUCOSE SERPL-MCNC: 98 MG/DL — SIGNIFICANT CHANGE UP (ref 70–99)
HCT VFR BLD CALC: 30 % — LOW (ref 34.5–45)
HGB BLD-MCNC: 10.3 G/DL — LOW (ref 11.5–15.5)
IMM GRANULOCYTES NFR BLD AUTO: 0.5 % — SIGNIFICANT CHANGE UP (ref 0–1.5)
INR BLD: 0.85 RATIO — LOW (ref 0.88–1.16)
LDH SERPL L TO P-CCNC: 413 U/L — HIGH (ref 50–242)
LYMPHOCYTES # BLD AUTO: 2.74 K/UL — SIGNIFICANT CHANGE UP (ref 1–3.3)
LYMPHOCYTES # BLD AUTO: 25.9 % — SIGNIFICANT CHANGE UP (ref 13–44)
MCHC RBC-ENTMCNC: 31.5 PG — SIGNIFICANT CHANGE UP (ref 27–34)
MCHC RBC-ENTMCNC: 34.3 GM/DL — SIGNIFICANT CHANGE UP (ref 32–36)
MCV RBC AUTO: 91.7 FL — SIGNIFICANT CHANGE UP (ref 80–100)
MONOCYTES # BLD AUTO: 0.79 K/UL — SIGNIFICANT CHANGE UP (ref 0–0.9)
MONOCYTES NFR BLD AUTO: 7.5 % — SIGNIFICANT CHANGE UP (ref 2–14)
NEUTROPHILS # BLD AUTO: 6.83 K/UL — SIGNIFICANT CHANGE UP (ref 1.8–7.4)
NEUTROPHILS NFR BLD AUTO: 64.6 % — SIGNIFICANT CHANGE UP (ref 43–77)
NRBC # BLD: 0 /100 WBCS — SIGNIFICANT CHANGE UP (ref 0–0)
PLATELET # BLD AUTO: 262 K/UL — SIGNIFICANT CHANGE UP (ref 150–400)
POTASSIUM SERPL-MCNC: 3.9 MMOL/L — SIGNIFICANT CHANGE UP (ref 3.5–5.3)
POTASSIUM SERPL-MCNC: 3.9 MMOL/L — SIGNIFICANT CHANGE UP (ref 3.5–5.3)
POTASSIUM SERPL-SCNC: 3.9 MMOL/L — SIGNIFICANT CHANGE UP (ref 3.5–5.3)
POTASSIUM SERPL-SCNC: 3.9 MMOL/L — SIGNIFICANT CHANGE UP (ref 3.5–5.3)
PROT SERPL-MCNC: 5.6 G/DL — LOW (ref 6–8.3)
PROTHROM AB SERPL-ACNC: 10.3 SEC — LOW (ref 10.6–13.6)
RBC # BLD: 3.27 M/UL — LOW (ref 3.8–5.2)
RBC # FLD: 11.9 % — SIGNIFICANT CHANGE UP (ref 10.3–14.5)
SODIUM SERPL-SCNC: 127 MMOL/L — LOW (ref 135–145)
SODIUM SERPL-SCNC: 130 MMOL/L — LOW (ref 135–145)
URATE SERPL-MCNC: 5.3 MG/DL — SIGNIFICANT CHANGE UP (ref 2.5–7)
WBC # BLD: 10.57 K/UL — HIGH (ref 3.8–10.5)
WBC # FLD AUTO: 10.57 K/UL — HIGH (ref 3.8–10.5)

## 2021-06-27 RX ORDER — IBUPROFEN 200 MG
600 TABLET ORAL EVERY 6 HOURS
Refills: 0 | Status: DISCONTINUED | OUTPATIENT
Start: 2021-06-27 | End: 2021-07-01

## 2021-06-27 RX ADMIN — Medication 600 MILLIGRAM(S): at 19:14

## 2021-06-27 RX ADMIN — Medication 975 MILLIGRAM(S): at 22:30

## 2021-06-27 RX ADMIN — Medication 975 MILLIGRAM(S): at 10:22

## 2021-06-27 RX ADMIN — Medication 975 MILLIGRAM(S): at 03:26

## 2021-06-27 RX ADMIN — Medication 975 MILLIGRAM(S): at 04:00

## 2021-06-27 RX ADMIN — Medication 975 MILLIGRAM(S): at 22:06

## 2021-06-27 RX ADMIN — Medication 975 MILLIGRAM(S): at 09:20

## 2021-06-27 RX ADMIN — Medication 600 MILLIGRAM(S): at 11:54

## 2021-06-27 RX ADMIN — Medication 100 MICROGRAM(S): at 06:41

## 2021-06-27 RX ADMIN — HEPARIN SODIUM 5000 UNIT(S): 5000 INJECTION INTRAVENOUS; SUBCUTANEOUS at 09:20

## 2021-06-27 RX ADMIN — HEPARIN SODIUM 5000 UNIT(S): 5000 INJECTION INTRAVENOUS; SUBCUTANEOUS at 22:07

## 2021-06-27 RX ADMIN — Medication 30 MILLIGRAM(S): at 01:00

## 2021-06-27 RX ADMIN — Medication 600 MILLIGRAM(S): at 12:40

## 2021-06-27 RX ADMIN — Medication 30 MILLIGRAM(S): at 00:22

## 2021-06-27 RX ADMIN — Medication 600 MILLIGRAM(S): at 18:29

## 2021-06-27 NOTE — PROGRESS NOTE ADULT - ASSESSMENT
41yo POD#2 s/p pLTCS 2/2 breech presentation c/b sPEC requiring Mg @34w6d. -130/70s. Patient is stable and doing well post-operatively.      #sPEC  - S/p Mag x24 for seizure ppx  - BPs well controlled. Consider PO antihypertensives if BP becomes moderate range  - HELLP labs 6/26 wnl, will repeat PRN.   - BP and symptom monitoring  - Cardio OB email to be sent    #PP care  - Continue regular diet.  - Increase ambulation.  - Continue motrin, tylenol, oxycodone PRN for pain control.   - HSQ, venodynes for dvt ppx    Danie, PGY-3

## 2021-06-28 RX ORDER — LABETALOL HCL 100 MG
40 TABLET ORAL ONCE
Refills: 0 | Status: COMPLETED | OUTPATIENT
Start: 2021-06-28 | End: 2021-06-28

## 2021-06-28 RX ORDER — LABETALOL HCL 100 MG
20 TABLET ORAL ONCE
Refills: 0 | Status: COMPLETED | OUTPATIENT
Start: 2021-06-28 | End: 2021-06-28

## 2021-06-28 RX ORDER — NIFEDIPINE 30 MG
30 TABLET, EXTENDED RELEASE 24 HR ORAL DAILY
Refills: 0 | Status: DISCONTINUED | OUTPATIENT
Start: 2021-06-28 | End: 2021-06-29

## 2021-06-28 RX ADMIN — Medication 600 MILLIGRAM(S): at 15:34

## 2021-06-28 RX ADMIN — HEPARIN SODIUM 5000 UNIT(S): 5000 INJECTION INTRAVENOUS; SUBCUTANEOUS at 21:43

## 2021-06-28 RX ADMIN — Medication 40 MILLIGRAM(S): at 19:50

## 2021-06-28 RX ADMIN — Medication 600 MILLIGRAM(S): at 22:15

## 2021-06-28 RX ADMIN — Medication 975 MILLIGRAM(S): at 04:28

## 2021-06-28 RX ADMIN — Medication 600 MILLIGRAM(S): at 09:08

## 2021-06-28 RX ADMIN — Medication 100 MICROGRAM(S): at 06:13

## 2021-06-28 RX ADMIN — Medication 975 MILLIGRAM(S): at 13:07

## 2021-06-28 RX ADMIN — Medication 600 MILLIGRAM(S): at 15:04

## 2021-06-28 RX ADMIN — Medication 975 MILLIGRAM(S): at 12:37

## 2021-06-28 RX ADMIN — Medication 600 MILLIGRAM(S): at 09:38

## 2021-06-28 RX ADMIN — HEPARIN SODIUM 5000 UNIT(S): 5000 INJECTION INTRAVENOUS; SUBCUTANEOUS at 09:08

## 2021-06-28 RX ADMIN — Medication 30 MILLIGRAM(S): at 20:28

## 2021-06-28 RX ADMIN — Medication 975 MILLIGRAM(S): at 05:00

## 2021-06-28 RX ADMIN — Medication 20 MILLIGRAM(S): at 19:27

## 2021-06-28 RX ADMIN — Medication 600 MILLIGRAM(S): at 21:45

## 2021-06-28 NOTE — PROVIDER CONTACT NOTE (OTHER) - ASSESSMENT
Pt asymptomatic. /93, pulse 68, spo2-99%, temp 36.6C
pt denies HA, blurry vision, epigastric pain or any other symptoms of PEC.
Pt asymptomatic. /96, pulse 81.
Pt asymptomatic. /83, pulse 71, spo2-97%
Pt asymptomatic. /93, pulse 64

## 2021-06-28 NOTE — PROGRESS NOTE ADULT - ASSESSMENT
41yo POD#3 s/p pLTCS 2/2 breech presentation c/b sPEC requiring Mg @34w6d. Patient is stable and doing well post-operatively.      #sPEC  - S/p Mag x24 for seizure ppx  - BPs well controlled. Consider PO antihypertensives if BP becomes moderate range  - HELLP labs 6/26 wnl, will repeat PRN.   - BP and symptom monitoring  - Cardio OB email to be sent    #PP care  - Continue regular diet.  - Increase ambulation.  - Continue motrin, tylenol, oxycodone PRN for pain control.   - HSQ, venodynes for dvt ppx  - Discharge planning    Tavon Ly, PGY2

## 2021-06-29 LAB
ALBUMIN SERPL ELPH-MCNC: 3.2 G/DL — LOW (ref 3.3–5)
ALP SERPL-CCNC: 97 U/L — SIGNIFICANT CHANGE UP (ref 40–120)
ALT FLD-CCNC: 90 U/L — HIGH (ref 10–45)
ANION GAP SERPL CALC-SCNC: 15 MMOL/L — SIGNIFICANT CHANGE UP (ref 5–17)
APTT BLD: 31.8 SEC — SIGNIFICANT CHANGE UP (ref 27.5–35.5)
AST SERPL-CCNC: 94 U/L — HIGH (ref 10–40)
BASOPHILS # BLD AUTO: 0.04 K/UL — SIGNIFICANT CHANGE UP (ref 0–0.2)
BASOPHILS NFR BLD AUTO: 0.4 % — SIGNIFICANT CHANGE UP (ref 0–2)
BILIRUB SERPL-MCNC: 0.2 MG/DL — SIGNIFICANT CHANGE UP (ref 0.2–1.2)
BUN SERPL-MCNC: 14 MG/DL — SIGNIFICANT CHANGE UP (ref 7–23)
CALCIUM SERPL-MCNC: 8.8 MG/DL — SIGNIFICANT CHANGE UP (ref 8.4–10.5)
CHLORIDE SERPL-SCNC: 102 MMOL/L — SIGNIFICANT CHANGE UP (ref 96–108)
CO2 SERPL-SCNC: 22 MMOL/L — SIGNIFICANT CHANGE UP (ref 22–31)
CREAT SERPL-MCNC: 0.64 MG/DL — SIGNIFICANT CHANGE UP (ref 0.5–1.3)
EOSINOPHIL # BLD AUTO: 0.49 K/UL — SIGNIFICANT CHANGE UP (ref 0–0.5)
EOSINOPHIL NFR BLD AUTO: 4.5 % — SIGNIFICANT CHANGE UP (ref 0–6)
FIBRINOGEN PPP-MCNC: 830 MG/DL — HIGH (ref 290–520)
GLUCOSE SERPL-MCNC: 90 MG/DL — SIGNIFICANT CHANGE UP (ref 70–99)
HCT VFR BLD CALC: 33 % — LOW (ref 34.5–45)
HGB BLD-MCNC: 10.7 G/DL — LOW (ref 11.5–15.5)
IMM GRANULOCYTES NFR BLD AUTO: 0.8 % — SIGNIFICANT CHANGE UP (ref 0–1.5)
INR BLD: 0.9 RATIO — SIGNIFICANT CHANGE UP (ref 0.88–1.16)
LDH SERPL L TO P-CCNC: 320 U/L — HIGH (ref 50–242)
LYMPHOCYTES # BLD AUTO: 2.78 K/UL — SIGNIFICANT CHANGE UP (ref 1–3.3)
LYMPHOCYTES # BLD AUTO: 25.6 % — SIGNIFICANT CHANGE UP (ref 13–44)
MCHC RBC-ENTMCNC: 30.2 PG — SIGNIFICANT CHANGE UP (ref 27–34)
MCHC RBC-ENTMCNC: 32.4 GM/DL — SIGNIFICANT CHANGE UP (ref 32–36)
MCV RBC AUTO: 93.2 FL — SIGNIFICANT CHANGE UP (ref 80–100)
MONOCYTES # BLD AUTO: 0.66 K/UL — SIGNIFICANT CHANGE UP (ref 0–0.9)
MONOCYTES NFR BLD AUTO: 6.1 % — SIGNIFICANT CHANGE UP (ref 2–14)
NEUTROPHILS # BLD AUTO: 6.79 K/UL — SIGNIFICANT CHANGE UP (ref 1.8–7.4)
NEUTROPHILS NFR BLD AUTO: 62.6 % — SIGNIFICANT CHANGE UP (ref 43–77)
NRBC # BLD: 0 /100 WBCS — SIGNIFICANT CHANGE UP (ref 0–0)
PLATELET # BLD AUTO: 308 K/UL — SIGNIFICANT CHANGE UP (ref 150–400)
POTASSIUM SERPL-MCNC: 3.9 MMOL/L — SIGNIFICANT CHANGE UP (ref 3.5–5.3)
POTASSIUM SERPL-SCNC: 3.9 MMOL/L — SIGNIFICANT CHANGE UP (ref 3.5–5.3)
PROT SERPL-MCNC: 6.6 G/DL — SIGNIFICANT CHANGE UP (ref 6–8.3)
PROTHROM AB SERPL-ACNC: 10.8 SEC — SIGNIFICANT CHANGE UP (ref 10.6–13.6)
RBC # BLD: 3.54 M/UL — LOW (ref 3.8–5.2)
RBC # FLD: 12.1 % — SIGNIFICANT CHANGE UP (ref 10.3–14.5)
SODIUM SERPL-SCNC: 139 MMOL/L — SIGNIFICANT CHANGE UP (ref 135–145)
URATE SERPL-MCNC: 5.6 MG/DL — SIGNIFICANT CHANGE UP (ref 2.5–7)
WBC # BLD: 10.85 K/UL — HIGH (ref 3.8–10.5)
WBC # FLD AUTO: 10.85 K/UL — HIGH (ref 3.8–10.5)

## 2021-06-29 RX ORDER — NIFEDIPINE 30 MG
30 TABLET, EXTENDED RELEASE 24 HR ORAL ONCE
Refills: 0 | Status: DISCONTINUED | OUTPATIENT
Start: 2021-06-29 | End: 2021-06-29

## 2021-06-29 RX ORDER — NIFEDIPINE 30 MG
30 TABLET, EXTENDED RELEASE 24 HR ORAL DAILY
Refills: 0 | Status: DISCONTINUED | OUTPATIENT
Start: 2021-06-29 | End: 2021-07-01

## 2021-06-29 RX ORDER — NIFEDIPINE 30 MG
60 TABLET, EXTENDED RELEASE 24 HR ORAL DAILY
Refills: 0 | Status: DISCONTINUED | OUTPATIENT
Start: 2021-06-29 | End: 2021-06-29

## 2021-06-29 RX ADMIN — HEPARIN SODIUM 5000 UNIT(S): 5000 INJECTION INTRAVENOUS; SUBCUTANEOUS at 09:50

## 2021-06-29 RX ADMIN — Medication 100 MICROGRAM(S): at 06:23

## 2021-06-29 RX ADMIN — Medication 600 MILLIGRAM(S): at 22:01

## 2021-06-29 RX ADMIN — Medication 600 MILLIGRAM(S): at 10:30

## 2021-06-29 RX ADMIN — Medication 975 MILLIGRAM(S): at 23:47

## 2021-06-29 RX ADMIN — Medication 975 MILLIGRAM(S): at 01:00

## 2021-06-29 RX ADMIN — Medication 975 MILLIGRAM(S): at 18:34

## 2021-06-29 RX ADMIN — Medication 975 MILLIGRAM(S): at 12:29

## 2021-06-29 RX ADMIN — Medication 30 MILLIGRAM(S): at 20:26

## 2021-06-29 RX ADMIN — Medication 975 MILLIGRAM(S): at 06:23

## 2021-06-29 RX ADMIN — Medication 600 MILLIGRAM(S): at 15:19

## 2021-06-29 RX ADMIN — Medication 975 MILLIGRAM(S): at 13:50

## 2021-06-29 RX ADMIN — Medication 975 MILLIGRAM(S): at 00:19

## 2021-06-29 RX ADMIN — Medication 600 MILLIGRAM(S): at 22:30

## 2021-06-29 RX ADMIN — HEPARIN SODIUM 5000 UNIT(S): 5000 INJECTION INTRAVENOUS; SUBCUTANEOUS at 21:58

## 2021-06-29 RX ADMIN — Medication 600 MILLIGRAM(S): at 09:50

## 2021-06-29 NOTE — PROGRESS NOTE ADULT - ASSESSMENT
39yo POD#4 s/p pLTCS @34w6d 2/2 breech presentation c/b sPEC requiring Mg. Patient w/ severe range BP overnight requiring IV antihypertensive medication and initiation of PO medication.  Otherwise stable and reaching post-operative milestones      #sPEC  - s/p Lab 20/40 IVP (6/28 PM)  - started on Procardia 30XL qD (6/28 PM)  - AM HELLP labs  - S/p Mag x24 for seizure ppx  - BP and symptom monitoring  - Cardio OB email to be sent    #PP care  - Continue regular diet.  - Increase ambulation.  - Continue motrin, tylenol, oxycodone PRN for pain control.   - HSQ, venodynes for dvt ppx      Tavon Ly, PGY2

## 2021-06-29 NOTE — CONSULT NOTE ADULT - ASSESSMENT
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     Recommendations Agree with Procardia XL 30 mg QD   May titrate up if BP > 140/90   Maintain K >4.0 and Mg<> 1.8  Baseline EKG   Echocardiogram to evaluate wall motion abnormality and valvular function  Encouraged patient to continue healthy diet, focusing on [place]iter[place] style  Follow up in [facility] clinic 1-2 weeks upon discharge.     Thanks for the consultation of this enrique patient.     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     Recommendations Agree with Procardia XL 30 mg QD   May titrate up if BP > 140/90   Maintain K >4.0 and Mg<> 1.8  Baseline EKG   Echocardiogram to evaluate wall motion abnormality and valvular function  Encouraged patient to continue healthy diet, focusing on [place] style  Follow up in [facility] clinic 1-2 weeks upon discharge.     Thanks for the consultation of this enrique patient.     DANIA Wheeler-C  Nurse Practitioner, [facility]   Department of Cardiology,   [address] [address], [place] [address]  [phone]

## 2021-06-29 NOTE — OB RN DELIVERY SUMMARY - NS_CORDBLDBNKA_OBGYN_ALL_OB
· Level on arrival 116, most likely in setting of persistent diarrhea and dehydration  · Initially treated with IVF with mild improvement of sodium level to 122 however was noted to be volume overloaded and IVF discontinued, nephrology consultation appreciated  · Sodium stable at 133  · C/w lasix therapy and sodium chloride tablets 2g tid  · Suspect 2/2 to SIADH  · Stable from nephrology standpoint for discharge on current regimen lasix/salt tabs No

## 2021-06-29 NOTE — CONSULT NOTE ADULT - SUBJECTIVE AND OBJECTIVE BOX
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 She carries a significant family history of HTN ( both parents ) and no significant personal history but was diagnosed with severe preeclampsia at 34 6/7 days post partum requiring MgSo4 and IV antihypertensives. Currently she is on PO Procardia XL 30 mg - tolerating well. Denies chest pain, shortness of breath, dizziness, lightheadedness, palpitations or near syncope or syncope, orthopnea, PND and increasing lower extremity edema.     PAST MEDICAL & SURGICAL HISTORY:  Hypothyroid  (on synthroid 50mcg PO daily)  S/P D&amp;C (status post dilation and curettage)  [place] teeth extracted  x2    PREVIOUS DIAGNOSTIC TESTING:    [x  ] Echocardiogram: Pending   [ ]  Catheterization:  [ ] Stress Test:  	    MEDICATIONS:  heparin   Injectable 5000 Unit(s) SubCutaneous every 12 hours  NIFEdipine XL 30 milliGRAM(s) Oral daily  acetaminophen   Tablet .. 975 milliGRAM(s) Oral <User Schedule>  diphenhydrAMINE 25 milliGRAM(s) Oral every 6 hours PRN  HYDROmorphone  Injectable 1 milliGRAM(s) IV Push every 3 hours PRN  ibuprofen  Tablet. 600 milliGRAM(s) Oral every 6 hours  nalbuphine Injectable 2.5 milliGRAM(s) IV Push every 6 hours PRN  ondansetron Injectable 4 milliGRAM(s) IV Push every 6 hours PRN  oxyCODONE    IR 5 milliGRAM(s) Oral every 3 hours PRN  oxyCODONE    IR 10 milliGRAM(s) Oral every 3 hours PRN  oxyCODONE    IR 5 milliGRAM(s) Oral every 3 hours PRN  oxyCODONE    IR 5 milliGRAM(s) Oral once PRN  magnesium hydroxide Suspension 30 milliLiter(s) Oral two times a day PRN  simethicone 80 milliGRAM(s) Chew every 4 hours PRN  levothyroxine 100 MICROGram(s) Oral daily    diphtheria/tetanus/pertussis (acellular) Vaccine (ADAcel) 0.5 milliLiter(s) IntraMuscular once  lactated ringers. 1000 milliLiter(s) IV Continuous <Continuous>  lanolin Ointment 1 Application(s) Topical every 6 hours PRN  oxytocin Infusion 333.333 milliUNIT(s)/Min IV Continuous <Continuous>      FAMILY HISTORY:  Both parents wiht history of HTN     SOCIAL HISTORY:    [ ] Non-smoker  [ ] Smoker  [ ] Alcohol    Allergies :   No Known Allergies    Intolerances :     REVIEW OF SYSTEMS:  CONSTITUTIONAL: No fever, weight loss, or fatigue  EYES: No eye pain, visual disturbances, or discharge  ENMT:  No difficulty hearing, tinnitus, vertigo; No sinus or throat pain  NECK: No pain or stiffness  RESPIRATORY: No cough, wheezing, chills or hemoptysis; No Shortness of Breath  CARDIOVASCULAR: No chest pain, palpitations, passing out, dizziness, or leg swelling  GASTROINTESTINAL: No abdominal or epigastric pain. No nausea, vomiting, or hematemesis; No diarrhea or constipation. No melena or hematochezia.  GENITOURINARY: No dysuria, frequency, hematuria, or incontinence  NEUROLOGICAL: No headaches, memory loss, loss of strength, numbness, or tremors  SKIN: No itching, burning, rashes, or lesions   LYMPH Nodes: No enlarged glands  ENDOCRINE: No heat or cold intolerance; No hair loss  MUSCULOSKELETAL: No joint pain or swelling; No muscle, back, or extremity pain  PSYCHIATRIC: No depression, anxiety, mood swings, or difficulty sleeping  HEME/LYMPH: No easy bruising, or bleeding gums  ALLERY AND IMMUNOLOGIC: No hives or eczema	    [ x] All others negative	  [ ] Unable to obtain    PHYSICAL EXAM:  T(C): 36.7 (06-29-21 @ 16:45), Max: 37.2 (06-28-21 @ 21:30)  HR: 76 (06-29-21 @ 16:45) (63 - 92)  BP: 148/80 (06-29-21 @ 16:45) (129/84 - 164/80)  RR: 18 (06-29-21 @ 16:45) (16 - 18)  SpO2: 98% (06-29-21 @ 16:45) (97% - 99%)  Wt(kg): --  I&O's Summary      Appearance: Normal	  HEENT:   Normal oral mucosa, PERRL, EOMI	  Lymphatic: No lymphadenopathy  Cardiovascular: Normal S1 S2, No JVD, No murmurs, No edema  Respiratory: Lungs clear to auscultation	  Psychiatry: A & O x 3, Mood & affect appropriate  Gastrointestinal:  Soft, Non-tender, + BS	  Skin: No rashes, No ecchymoses, No cyanosis	  Neurologic: Non-focal  Extremities: Normal range of motion, No clubbing, cyanosis or edema  Vascular: Peripheral pulses palpable 2+ bilaterally    TELEMETRY: Not on tele   ECG: Not avaialable  	  RADIOLOGY:  OTHER: 	  	  LABS:	 	    CARDIAC MARKERS:                         10.7   10.85 )-----------( 308      ( 29 Jun 2021 07:03 )             33.0     06-29    139  |  102  |  14  ----------------------------<  90  3.9   |  22  |  0.64    Ca    8.8      29 Jun 2021 07:00    TPro  6.6  /  Alb  3.2<L>  /  TBili  0.2  /  DBili  x   /  AST  94<H>  /  ALT  90<H>  /  AlkPhos  97  06-29        	         CHIEF COMPLAINT: Severe Preeclampsia   HISTORY OF PRESENT ILLNESS: 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 She carries a significant family history of HTN ( both parents ) and no significant personal history but was diagnosed with severe preeclampsia at 34 6/7 days post partum requiring MgSo4 and IV antihypertensives. Currently she is on PO Procardia XL 30 mg - tolerating well. Denies chest pain, shortness of breath, dizziness, lightheadedness, palpitations or near syncope or syncope, orthopnea, PND and increasing lower extremity edema.     PAST MEDICAL & SURGICAL HISTORY:  Hypothyroid  (on synthroid 50mcg PO daily)  S/P D&amp;C (status post dilation and curettage)  [place] teeth extracted  x2    PREVIOUS DIAGNOSTIC TESTING:    [x  ] Echocardiogram: Pending   [ ]  Catheterization:  [ ] Stress Test:  	    MEDICATIONS:  heparin   Injectable 5000 Unit(s) SubCutaneous every 12 hours  NIFEdipine XL 30 milliGRAM(s) Oral daily  acetaminophen   Tablet .. 975 milliGRAM(s) Oral <User Schedule>  diphenhydrAMINE 25 milliGRAM(s) Oral every 6 hours PRN  HYDROmorphone  Injectable 1 milliGRAM(s) IV Push every 3 hours PRN  ibuprofen  Tablet. 600 milliGRAM(s) Oral every 6 hours  nalbuphine Injectable 2.5 milliGRAM(s) IV Push every 6 hours PRN  ondansetron Injectable 4 milliGRAM(s) IV Push every 6 hours PRN  oxyCODONE    IR 5 milliGRAM(s) Oral every 3 hours PRN  oxyCODONE    IR 10 milliGRAM(s) Oral every 3 hours PRN  oxyCODONE    IR 5 milliGRAM(s) Oral every 3 hours PRN  oxyCODONE    IR 5 milliGRAM(s) Oral once PRN  magnesium hydroxide Suspension 30 milliLiter(s) Oral two times a day PRN  simethicone 80 milliGRAM(s) Chew every 4 hours PRN  levothyroxine 100 MICROGram(s) Oral daily    diphtheria/tetanus/pertussis (acellular) Vaccine (ADAcel) 0.5 milliLiter(s) IntraMuscular once  lactated ringers. 1000 milliLiter(s) IV Continuous <Continuous>  lanolin Ointment 1 Application(s) Topical every 6 hours PRN  oxytocin Infusion 333.333 milliUNIT(s)/Min IV Continuous <Continuous>      FAMILY HISTORY:  Both parents wiht history of HTN     SOCIAL HISTORY:    [ ] Non-smoker  [ ] Smoker  [ ] Alcohol    Allergies :   No Known Allergies    Intolerances :     REVIEW OF SYSTEMS:  CONSTITUTIONAL: No fever, weight loss, or fatigue  EYES: No eye pain, visual disturbances, or discharge  ENMT:  No difficulty hearing, tinnitus, vertigo; No sinus or throat pain  NECK: No pain or stiffness  RESPIRATORY: No cough, wheezing, chills or hemoptysis; No Shortness of Breath  CARDIOVASCULAR: No chest pain, palpitations, passing out, dizziness, or leg swelling  GASTROINTESTINAL: No abdominal or epigastric pain. No nausea, vomiting, or hematemesis; No diarrhea or constipation. No melena or hematochezia.  GENITOURINARY: No dysuria, frequency, hematuria, or incontinence  NEUROLOGICAL: No headaches, memory loss, loss of strength, numbness, or tremors  SKIN: No itching, burning, rashes, or lesions   LYMPH Nodes: No enlarged glands  ENDOCRINE: No heat or cold intolerance; No hair loss  MUSCULOSKELETAL: No joint pain or swelling; No muscle, back, or extremity pain  PSYCHIATRIC: No depression, anxiety, mood swings, or difficulty sleeping  HEME/LYMPH: No easy bruising, or bleeding gums  ALLERY AND IMMUNOLOGIC: No hives or eczema	    [ x] All others negative	  [ ] Unable to obtain    PHYSICAL EXAM:  T(C): 36.7 (06-29-21 @ 16:45), Max: 37.2 (06-28-21 @ 21:30)  HR: 76 (06-29-21 @ 16:45) (63 - 92)  BP: 148/80 (06-29-21 @ 16:45) (129/84 - 164/80)  RR: 18 (06-29-21 @ 16:45) (16 - 18)  SpO2: 98% (06-29-21 @ 16:45) (97% - 99%)  Wt(kg): --  I&O's Summary      Appearance: Normal	  HEENT:   Normal oral mucosa, PERRL, EOMI	  Lymphatic: No lymphadenopathy  Cardiovascular: Normal S1 S2, No JVD, No murmurs, No edema  Respiratory: Lungs clear to auscultation	  Psychiatry: A & O x 3, Mood & affect appropriate  Gastrointestinal:  Soft, Non-tender, + BS	  Skin: No rashes, No ecchymoses, No cyanosis	  Neurologic: Non-focal  Extremities: Normal range of motion, No clubbing, cyanosis or edema  Vascular: Peripheral pulses palpable 2+ bilaterally    TELEMETRY: Not on tele   ECG: Not avaialable  	  RADIOLOGY:  OTHER: 	  	  LABS:	 	    CARDIAC MARKERS:                         10.7   10.85 )-----------( 308      ( 29 Jun 2021 07:03 )             33.0     06-29    139  |  102  |  14  ----------------------------<  90  3.9   |  22  |  0.64    Ca    8.8      29 Jun 2021 07:00    TPro  6.6  /  Alb  3.2<L>  /  TBili  0.2  /  DBili  x   /  AST  94<H>  /  ALT  90<H>  /  AlkPhos  97  06-29

## 2021-06-30 ENCOUNTER — TRANSCRIPTION ENCOUNTER (OUTPATIENT)
Age: 41
End: 2021-06-30

## 2021-06-30 LAB
ALBUMIN SERPL ELPH-MCNC: 3.2 G/DL — LOW (ref 3.3–5)
ALP SERPL-CCNC: 109 U/L — SIGNIFICANT CHANGE UP (ref 40–120)
ALT FLD-CCNC: 141 U/L — HIGH (ref 10–45)
ANION GAP SERPL CALC-SCNC: 15 MMOL/L — SIGNIFICANT CHANGE UP (ref 5–17)
APTT BLD: 31 SEC — SIGNIFICANT CHANGE UP (ref 27.5–35.5)
AST SERPL-CCNC: 88 U/L — HIGH (ref 10–40)
BASOPHILS # BLD AUTO: 0.05 K/UL — SIGNIFICANT CHANGE UP (ref 0–0.2)
BASOPHILS NFR BLD AUTO: 0.4 % — SIGNIFICANT CHANGE UP (ref 0–2)
BILIRUB SERPL-MCNC: 0.2 MG/DL — SIGNIFICANT CHANGE UP (ref 0.2–1.2)
BUN SERPL-MCNC: 20 MG/DL — SIGNIFICANT CHANGE UP (ref 7–23)
CALCIUM SERPL-MCNC: 9.5 MG/DL — SIGNIFICANT CHANGE UP (ref 8.4–10.5)
CHLORIDE SERPL-SCNC: 102 MMOL/L — SIGNIFICANT CHANGE UP (ref 96–108)
CO2 SERPL-SCNC: 20 MMOL/L — LOW (ref 22–31)
CREAT SERPL-MCNC: 0.61 MG/DL — SIGNIFICANT CHANGE UP (ref 0.5–1.3)
EOSINOPHIL # BLD AUTO: 0.68 K/UL — HIGH (ref 0–0.5)
EOSINOPHIL NFR BLD AUTO: 5 % — SIGNIFICANT CHANGE UP (ref 0–6)
FIBRINOGEN PPP-MCNC: 680 MG/DL — HIGH (ref 290–520)
GLUCOSE SERPL-MCNC: 86 MG/DL — SIGNIFICANT CHANGE UP (ref 70–99)
HCT VFR BLD CALC: 32.2 % — LOW (ref 34.5–45)
HGB BLD-MCNC: 10.9 G/DL — LOW (ref 11.5–15.5)
IMM GRANULOCYTES NFR BLD AUTO: 1 % — SIGNIFICANT CHANGE UP (ref 0–1.5)
INR BLD: 0.86 RATIO — LOW (ref 0.88–1.16)
LDH SERPL L TO P-CCNC: 294 U/L — HIGH (ref 50–242)
LYMPHOCYTES # BLD AUTO: 18.8 % — SIGNIFICANT CHANGE UP (ref 13–44)
LYMPHOCYTES # BLD AUTO: 2.54 K/UL — SIGNIFICANT CHANGE UP (ref 1–3.3)
MCHC RBC-ENTMCNC: 31.9 PG — SIGNIFICANT CHANGE UP (ref 27–34)
MCHC RBC-ENTMCNC: 33.9 GM/DL — SIGNIFICANT CHANGE UP (ref 32–36)
MCV RBC AUTO: 94.2 FL — SIGNIFICANT CHANGE UP (ref 80–100)
MONOCYTES # BLD AUTO: 0.95 K/UL — HIGH (ref 0–0.9)
MONOCYTES NFR BLD AUTO: 7 % — SIGNIFICANT CHANGE UP (ref 2–14)
NEUTROPHILS # BLD AUTO: 9.16 K/UL — HIGH (ref 1.8–7.4)
NEUTROPHILS NFR BLD AUTO: 67.8 % — SIGNIFICANT CHANGE UP (ref 43–77)
NRBC # BLD: 0 /100 WBCS — SIGNIFICANT CHANGE UP (ref 0–0)
PLATELET # BLD AUTO: 366 K/UL — SIGNIFICANT CHANGE UP (ref 150–400)
POTASSIUM SERPL-MCNC: 3.9 MMOL/L — SIGNIFICANT CHANGE UP (ref 3.5–5.3)
POTASSIUM SERPL-SCNC: 3.9 MMOL/L — SIGNIFICANT CHANGE UP (ref 3.5–5.3)
PROT SERPL-MCNC: 6.7 G/DL — SIGNIFICANT CHANGE UP (ref 6–8.3)
PROTHROM AB SERPL-ACNC: 10.4 SEC — LOW (ref 10.6–13.6)
RBC # BLD: 3.42 M/UL — LOW (ref 3.8–5.2)
RBC # FLD: 12.2 % — SIGNIFICANT CHANGE UP (ref 10.3–14.5)
SODIUM SERPL-SCNC: 137 MMOL/L — SIGNIFICANT CHANGE UP (ref 135–145)
URATE SERPL-MCNC: 5.2 MG/DL — SIGNIFICANT CHANGE UP (ref 2.5–7)
WBC # BLD: 13.52 K/UL — HIGH (ref 3.8–10.5)
WBC # FLD AUTO: 13.52 K/UL — HIGH (ref 3.8–10.5)

## 2021-06-30 RX ORDER — IBUPROFEN 200 MG
1 TABLET ORAL
Qty: 0 | Refills: 0 | DISCHARGE
Start: 2021-06-30

## 2021-06-30 RX ORDER — BNT162B2 0.23 MG/2.25ML
0.3 INJECTION, SUSPENSION INTRAMUSCULAR ONCE
Refills: 0 | Status: COMPLETED | OUTPATIENT
Start: 2021-06-30 | End: 2021-06-30

## 2021-06-30 RX ORDER — NIFEDIPINE 30 MG
1 TABLET, EXTENDED RELEASE 24 HR ORAL
Qty: 30 | Refills: 1
Start: 2021-06-30

## 2021-06-30 RX ORDER — LEVOTHYROXINE SODIUM 125 MCG
1 TABLET ORAL
Qty: 0 | Refills: 0 | DISCHARGE
Start: 2021-06-30

## 2021-06-30 RX ORDER — OXYCODONE HYDROCHLORIDE 5 MG/1
1 TABLET ORAL
Qty: 10 | Refills: 0
Start: 2021-06-30

## 2021-06-30 RX ORDER — ACETAMINOPHEN 500 MG
3 TABLET ORAL
Qty: 0 | Refills: 0 | DISCHARGE
Start: 2021-06-30

## 2021-06-30 RX ADMIN — Medication 600 MILLIGRAM(S): at 04:00

## 2021-06-30 RX ADMIN — Medication 100 MICROGRAM(S): at 06:03

## 2021-06-30 RX ADMIN — Medication 600 MILLIGRAM(S): at 09:29

## 2021-06-30 RX ADMIN — Medication 975 MILLIGRAM(S): at 12:35

## 2021-06-30 RX ADMIN — Medication 600 MILLIGRAM(S): at 21:58

## 2021-06-30 RX ADMIN — Medication 600 MILLIGRAM(S): at 03:34

## 2021-06-30 RX ADMIN — Medication 600 MILLIGRAM(S): at 10:41

## 2021-06-30 RX ADMIN — Medication 975 MILLIGRAM(S): at 06:04

## 2021-06-30 RX ADMIN — BNT162B2 0.3 MILLILITER(S): 0.23 INJECTION, SUSPENSION INTRAMUSCULAR at 10:51

## 2021-06-30 RX ADMIN — Medication 600 MILLIGRAM(S): at 21:25

## 2021-06-30 RX ADMIN — HEPARIN SODIUM 5000 UNIT(S): 5000 INJECTION INTRAVENOUS; SUBCUTANEOUS at 21:24

## 2021-06-30 RX ADMIN — HEPARIN SODIUM 5000 UNIT(S): 5000 INJECTION INTRAVENOUS; SUBCUTANEOUS at 09:29

## 2021-06-30 RX ADMIN — Medication 975 MILLIGRAM(S): at 11:52

## 2021-06-30 RX ADMIN — Medication 30 MILLIGRAM(S): at 07:43

## 2021-06-30 RX ADMIN — Medication 975 MILLIGRAM(S): at 00:30

## 2021-06-30 NOTE — DISCHARGE NOTE OB - PATIENT PORTAL LINK FT
You can access the FollowMyHealth Patient Portal offered by [facility] by registering at the following website: [email or web address]. By joining [facility]’s FollowMyHealth portal, you will also be able to view your health information using other applications (apps) compatible with our system.

## 2021-06-30 NOTE — DISCHARGE NOTE OB - NURSING SECTION COMPLETE
Spoke with Madhavi at [facility]. She recieved response from insurance that there is no chance of coverage out of the state [facility]. I asked if this was denied here would that be a considering factor. After saying that I am not sure if the transplant was actually denied. I will send you note from 9/15 regarding BMI requirement. Are you aware of a denial letter?       Madhavi Del Angel fax- [phone]   Patient/Caregiver provided printed discharge information.

## 2021-06-30 NOTE — DISCHARGE NOTE OB - PLAN OF CARE
follow up in 2 weeks for incision check home continue procardia, follow up with cardiologist,   retrun monday to office for BP check

## 2021-06-30 NOTE — PROGRESS NOTE ADULT - ASSESSMENT
39yo POD#5 s/p pLTCS @34w6d 2/2 breech presentation c/b sPEC requiring Mg. BP controlled on PO medication.  Otherwise stable and reaching post-operative milestones      #sPEC  - s/p Lab 20/40 IVP (6/28 PM)  - started on Procardia 30XL qD (6/28 PM)  - S/p Mag x24 for seizure ppx  - BP and symptom monitoring  - Cardio OB email to be sent    #PP care  - Continue regular diet.  - Increase ambulation.  - Continue motrin, tylenol, oxycodone PRN for pain control.   - HSQ, venodynes for dvt ppx    Tavon Ly, PGY2

## 2021-06-30 NOTE — DISCHARGE NOTE OB - COMMENTS
check BP 3 x daily.  Notify md for BP greater than 150/90 and/or s/s hypertension.  Sean procardia if BP less than 110/70 and call md

## 2021-06-30 NOTE — DISCHARGE NOTE OB - IF YOU ARE A SMOKER, IT IS IMPORTANT FOR YOUR HEALTH TO STOP SMOKING. PLEASE BE AWARE THAT SECOND HAND SMOKE IS ALSO HARMFUL.
Wells Goldberg and has a question about changing patient formula from jesus manuel gentle to isomil.
Statement Selected

## 2021-06-30 NOTE — PROGRESS NOTE ADULT - NUTRITIONAL ASSESSMENT
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     Recommendations Continue Procardia XL 30 mg QD   May titrate up if BP > 140/90   Maintain K >4.0 and Mg<> 1.8  Echocardiogram - Pending  Encouraged patient to continue healthy diet, focusing on [place] style  Follow up in [facility] clinic 1-2 weeks upon discharge.

## 2021-06-30 NOTE — DISCHARGE NOTE OB - CARE PLAN
Principal Discharge DX:	 delivery delivered  Goal:	home  Assessment and plan of treatment:	follow up in 2 weeks for incision check  Secondary Diagnosis:	Severe pre-eclampsia in third trimester  Goal:	home  Assessment and plan of treatment:	continue procardia, follow up with cardiologist,   retrun monday to office for BP check

## 2021-07-01 VITALS
OXYGEN SATURATION: 98 % | SYSTOLIC BLOOD PRESSURE: 133 MMHG | RESPIRATION RATE: 18 BRPM | HEART RATE: 98 BPM | DIASTOLIC BLOOD PRESSURE: 88 MMHG | TEMPERATURE: 98 F

## 2021-07-01 LAB
ALBUMIN SERPL ELPH-MCNC: 3.9 G/DL — SIGNIFICANT CHANGE UP (ref 3.3–5)
ALP SERPL-CCNC: 111 U/L — SIGNIFICANT CHANGE UP (ref 40–120)
ALT FLD-CCNC: 103 U/L — HIGH (ref 10–45)
ANION GAP SERPL CALC-SCNC: 14 MMOL/L — SIGNIFICANT CHANGE UP (ref 5–17)
APPEARANCE UR: CLEAR — SIGNIFICANT CHANGE UP
APTT BLD: 34.1 SEC — SIGNIFICANT CHANGE UP (ref 27.5–35.5)
AST SERPL-CCNC: 36 U/L — SIGNIFICANT CHANGE UP (ref 10–40)
BACTERIA # UR AUTO: NEGATIVE — SIGNIFICANT CHANGE UP
BASOPHILS # BLD AUTO: 0.08 K/UL — SIGNIFICANT CHANGE UP (ref 0–0.2)
BASOPHILS NFR BLD AUTO: 0.6 % — SIGNIFICANT CHANGE UP (ref 0–2)
BILIRUB SERPL-MCNC: 0.2 MG/DL — SIGNIFICANT CHANGE UP (ref 0.2–1.2)
BILIRUB UR-MCNC: NEGATIVE — SIGNIFICANT CHANGE UP
BUN SERPL-MCNC: 20 MG/DL — SIGNIFICANT CHANGE UP (ref 7–23)
CALCIUM SERPL-MCNC: 9.9 MG/DL — SIGNIFICANT CHANGE UP (ref 8.4–10.5)
CHLORIDE SERPL-SCNC: 101 MMOL/L — SIGNIFICANT CHANGE UP (ref 96–108)
CO2 SERPL-SCNC: 23 MMOL/L — SIGNIFICANT CHANGE UP (ref 22–31)
COLOR SPEC: SIGNIFICANT CHANGE UP
CREAT ?TM UR-MCNC: 36 MG/DL — SIGNIFICANT CHANGE UP
CREAT SERPL-MCNC: 0.77 MG/DL — SIGNIFICANT CHANGE UP (ref 0.5–1.3)
DIFF PNL FLD: ABNORMAL
EOSINOPHIL # BLD AUTO: 0.58 K/UL — HIGH (ref 0–0.5)
EOSINOPHIL NFR BLD AUTO: 4.2 % — SIGNIFICANT CHANGE UP (ref 0–6)
EPI CELLS # UR: 0 /HPF — SIGNIFICANT CHANGE UP
FIBRINOGEN PPP-MCNC: 852 MG/DL — HIGH (ref 290–520)
GLUCOSE SERPL-MCNC: 78 MG/DL — SIGNIFICANT CHANGE UP (ref 70–99)
GLUCOSE UR QL: NEGATIVE — SIGNIFICANT CHANGE UP
HCT VFR BLD CALC: 36.2 % — SIGNIFICANT CHANGE UP (ref 34.5–45)
HGB BLD-MCNC: 12.1 G/DL — SIGNIFICANT CHANGE UP (ref 11.5–15.5)
HYALINE CASTS # UR AUTO: 0 /LPF — SIGNIFICANT CHANGE UP (ref 0–2)
IMM GRANULOCYTES NFR BLD AUTO: 1.4 % — SIGNIFICANT CHANGE UP (ref 0–1.5)
INR BLD: 0.91 RATIO — SIGNIFICANT CHANGE UP (ref 0.88–1.16)
KETONES UR-MCNC: NEGATIVE — SIGNIFICANT CHANGE UP
LDH SERPL L TO P-CCNC: 292 U/L — HIGH (ref 50–242)
LEUKOCYTE ESTERASE UR-ACNC: ABNORMAL
LYMPHOCYTES # BLD AUTO: 2.94 K/UL — SIGNIFICANT CHANGE UP (ref 1–3.3)
LYMPHOCYTES # BLD AUTO: 21.2 % — SIGNIFICANT CHANGE UP (ref 13–44)
MCHC RBC-ENTMCNC: 31.3 PG — SIGNIFICANT CHANGE UP (ref 27–34)
MCHC RBC-ENTMCNC: 33.4 GM/DL — SIGNIFICANT CHANGE UP (ref 32–36)
MCV RBC AUTO: 93.8 FL — SIGNIFICANT CHANGE UP (ref 80–100)
MONOCYTES # BLD AUTO: 0.9 K/UL — SIGNIFICANT CHANGE UP (ref 0–0.9)
MONOCYTES NFR BLD AUTO: 6.5 % — SIGNIFICANT CHANGE UP (ref 2–14)
NEUTROPHILS # BLD AUTO: 9.17 K/UL — HIGH (ref 1.8–7.4)
NEUTROPHILS NFR BLD AUTO: 66.1 % — SIGNIFICANT CHANGE UP (ref 43–77)
NITRITE UR-MCNC: NEGATIVE — SIGNIFICANT CHANGE UP
NRBC # BLD: 0 /100 WBCS — SIGNIFICANT CHANGE UP (ref 0–0)
PH UR: 6 — SIGNIFICANT CHANGE UP (ref 5–8)
PLATELET # BLD AUTO: 427 K/UL — HIGH (ref 150–400)
POTASSIUM SERPL-MCNC: 4 MMOL/L — SIGNIFICANT CHANGE UP (ref 3.5–5.3)
POTASSIUM SERPL-SCNC: 4 MMOL/L — SIGNIFICANT CHANGE UP (ref 3.5–5.3)
PROT ?TM UR-MCNC: 4 MG/DL — SIGNIFICANT CHANGE UP (ref 0–12)
PROT ?TM UR-MCNC: 4 MG/DL — SIGNIFICANT CHANGE UP (ref 0–12)
PROT SERPL-MCNC: 7.8 G/DL — SIGNIFICANT CHANGE UP (ref 6–8.3)
PROT UR-MCNC: NEGATIVE — SIGNIFICANT CHANGE UP
PROT/CREAT UR-RTO: 0.1 RATIO — SIGNIFICANT CHANGE UP (ref 0–0.2)
PROTHROM AB SERPL-ACNC: 11 SEC — SIGNIFICANT CHANGE UP (ref 10.6–13.6)
RBC # BLD: 3.86 M/UL — SIGNIFICANT CHANGE UP (ref 3.8–5.2)
RBC # FLD: 12.2 % — SIGNIFICANT CHANGE UP (ref 10.3–14.5)
RBC CASTS # UR COMP ASSIST: 1 /HPF — SIGNIFICANT CHANGE UP (ref 0–4)
SODIUM SERPL-SCNC: 138 MMOL/L — SIGNIFICANT CHANGE UP (ref 135–145)
SP GR SPEC: 1.01 — SIGNIFICANT CHANGE UP (ref 1.01–1.02)
URATE SERPL-MCNC: 6 MG/DL — SIGNIFICANT CHANGE UP (ref 2.5–7)
UROBILINOGEN FLD QL: NEGATIVE — SIGNIFICANT CHANGE UP
WBC # BLD: 13.87 K/UL — HIGH (ref 3.8–10.5)
WBC # FLD AUTO: 13.87 K/UL — HIGH (ref 3.8–10.5)
WBC UR QL: 1 /HPF — SIGNIFICANT CHANGE UP (ref 0–5)

## 2021-07-01 PROCEDURE — 86900 BLOOD TYPING SEROLOGIC ABO: CPT

## 2021-07-01 PROCEDURE — 86769 SARS-COV-2 COVID-19 ANTIBODY: CPT

## 2021-07-01 PROCEDURE — 80048 BASIC METABOLIC PNL TOTAL CA: CPT

## 2021-07-01 PROCEDURE — 88307 TISSUE EXAM BY PATHOLOGIST: CPT

## 2021-07-01 PROCEDURE — 59050 FETAL MONITOR W/REPORT: CPT

## 2021-07-01 PROCEDURE — 82570 ASSAY OF URINE CREATININE: CPT

## 2021-07-01 PROCEDURE — G0463: CPT

## 2021-07-01 PROCEDURE — 80053 COMPREHEN METABOLIC PANEL: CPT

## 2021-07-01 PROCEDURE — 84550 ASSAY OF BLOOD/URIC ACID: CPT

## 2021-07-01 PROCEDURE — 87653 STREP B DNA AMP PROBE: CPT

## 2021-07-01 PROCEDURE — C1889: CPT

## 2021-07-01 PROCEDURE — 85384 FIBRINOGEN ACTIVITY: CPT

## 2021-07-01 PROCEDURE — 87635 SARS-COV-2 COVID-19 AMP PRB: CPT

## 2021-07-01 PROCEDURE — 86850 RBC ANTIBODY SCREEN: CPT

## 2021-07-01 PROCEDURE — 85730 THROMBOPLASTIN TIME PARTIAL: CPT

## 2021-07-01 PROCEDURE — 85025 COMPLETE CBC W/AUTO DIFF WBC: CPT

## 2021-07-01 PROCEDURE — 59025 FETAL NON-STRESS TEST: CPT

## 2021-07-01 PROCEDURE — 83615 LACTATE (LD) (LDH) ENZYME: CPT

## 2021-07-01 PROCEDURE — 86780 TREPONEMA PALLIDUM: CPT

## 2021-07-01 PROCEDURE — 84156 ASSAY OF PROTEIN URINE: CPT

## 2021-07-01 PROCEDURE — 99222 1ST HOSP IP/OBS MODERATE 55: CPT

## 2021-07-01 PROCEDURE — 93306 TTE W/DOPPLER COMPLETE: CPT

## 2021-07-01 PROCEDURE — 85610 PROTHROMBIN TIME: CPT

## 2021-07-01 PROCEDURE — 86901 BLOOD TYPING SEROLOGIC RH(D): CPT

## 2021-07-01 PROCEDURE — 81001 URINALYSIS AUTO W/SCOPE: CPT

## 2021-07-01 PROCEDURE — 81003 URINALYSIS AUTO W/O SCOPE: CPT

## 2021-07-01 PROCEDURE — 93306 TTE W/DOPPLER COMPLETE: CPT | Mod: 26

## 2021-07-01 PROCEDURE — 83735 ASSAY OF MAGNESIUM: CPT

## 2021-07-01 RX ADMIN — Medication 975 MILLIGRAM(S): at 13:44

## 2021-07-01 RX ADMIN — Medication 975 MILLIGRAM(S): at 13:14

## 2021-07-01 RX ADMIN — Medication 975 MILLIGRAM(S): at 06:20

## 2021-07-01 RX ADMIN — Medication 600 MILLIGRAM(S): at 02:58

## 2021-07-01 RX ADMIN — Medication 975 MILLIGRAM(S): at 00:58

## 2021-07-01 RX ADMIN — HEPARIN SODIUM 5000 UNIT(S): 5000 INJECTION INTRAVENOUS; SUBCUTANEOUS at 09:46

## 2021-07-01 RX ADMIN — Medication 600 MILLIGRAM(S): at 09:45

## 2021-07-01 RX ADMIN — Medication 600 MILLIGRAM(S): at 02:40

## 2021-07-01 RX ADMIN — Medication 30 MILLIGRAM(S): at 06:20

## 2021-07-01 RX ADMIN — Medication 100 MICROGRAM(S): at 06:20

## 2021-07-01 RX ADMIN — Medication 975 MILLIGRAM(S): at 00:29

## 2021-07-01 RX ADMIN — Medication 600 MILLIGRAM(S): at 10:15

## 2021-07-01 NOTE — PROGRESS NOTE ADULT - SUBJECTIVE AND OBJECTIVE BOX
MFM Fellow Not e    Patient evaluated at bedside. No acute events overnight. Denies headache, change in vision, upper epigastric pain, nausea, vomiting, chest pain and shortness of breath.     In the afternoon patient had isolated severe range BP of 167/90 and repeat was elevated 156/79. Patient reports she had friends in the room and attributes it tot he excitement. Otherwise has no complaints       Vital Signs Last 24 Hours  T(C): 36.7 (06-25-21 @ 14:50), Max: 37.1 (06-24-21 @ 21:25)  HR: 71 (06-25-21 @ 15:01) (68 - 116)  BP: 156/79 (06-25-21 @ 15:01) (117/69 - 167/90)  RR: 18 (06-25-21 @ 15:01) (18 - 18)  SpO2: 100% (06-25-21 @ 15:01) (97% - 100%)    I&O's Summary      Physical Exam:  General: NAD  CV: NR, RR, S1, S2, no M/R/G  Lungs: CTA-B  Abdomen: Soft, non-tender, non-distended, +BS  Incision: _ CDI  Ext: No pain or swelling    Labs:                        10.6   11.92 )-----------( 260      ( 24 Jun 2021 09:07 )             32.6   baso 0.3    eos 0.0    imm gran 1.7    lymph 18.6   mono 9.4    poly 70.0     06-24    137  |  105  |  13  ----------------------------<  95  3.7   |  21<L>  |  0.58    Ca    8.4      24 Jun 2021 09:07    TPro  6.4  /  Alb  3.3  /  TBili  0.2  /  DBili  x   /  AST  32  /  ALT  29  /  AlkPhos  111  06-24    PT/INR - ( 24 Jun 2021 09:07 )   PT: 10.5 sec;   INR: 0.87 ratio         PTT - ( 24 Jun 2021 09:07 )  PTT:19.9 sec      Blood Type: O Positive      MEDICATIONS  (STANDING):  folic acid 1 milliGRAM(s) Oral daily  heparin   Injectable 5000 Unit(s) SubCutaneous every 12 hours  lactated ringers. 1000 milliLiter(s) (125 mL/Hr) IV Continuous <Continuous>  levothyroxine 100 MICROGram(s) Oral daily  levothyroxine 50 MICROGram(s) Oral daily  prenatal multivitamin 1 Tablet(s) Oral daily  sodium chloride 0.9% lock flush 3 milliLiter(s) IV Push every 8 hours    MEDICATIONS  (PRN):        Meredith Godoy MD
R2 Antepartum Note, HD#2    Interval events: ctx q2min overnight, cervical exam unchanged, procardia initiated for tocolysis    Patient seen and examined at bedside. No acute complaints at this time, does not appreciate any contractions. Pt reports +FM, denies LOF, VB, ctx, HA, epigastric pain, blurred vision, CP, SOB, N/V, fevers, and chills.    Vital Signs Last 24 Hours  T(C): 36.8 (06-22-21 @ 05:41), Max: 37.1 (06-21-21 @ 23:40)  HR: 90 (06-22-21 @ 05:41) (67 - 115)  BP: 99/61 (06-22-21 @ 05:41) (99/61 - 159/89)  RR: 18 (06-22-21 @ 05:41) (16 - 18)  SpO2: 96% (06-22-21 @ 05:41) (92% - 100%)    CAPILLARY BLOOD GLUCOSE          Physical Exam:  General: NAD  Abdomen: Soft, non-tender, gravid  Ext: No pain or swelling    NST reactive overnight    Labs:             12.1   8.54  )-----------( 281      ( 06-21 @ 15:23 )             36.9     06-21 @ 15:22    136  |  102  |  9   ----------------------------<  75  3.7   |  19  |  0.59    Ca    9.8      06-21 @ 15:22    TPro  7.0  /  Alb  3.6  /  TBili  0.2  /  DBili  x   /  AST  25  /  ALT  25  /  AlkPhos  137  06-21 @ 15:22    PT/INR - ( 06-21 @ 15:22 )   PT: 10.9 sec;   INR: 0.90 ratio    PTT - ( 06-21 @ 15:22 )  PTT:28.1 sec    Uric Acid: (06-21 @ 15:22)  4.6      Fibrinogen: (06-21 @ 15:22)  564      LDH: (06-21 @ 15:22)  225        MEDICATIONS  (STANDING):  ampicillin  IVPB 1 Gram(s) IV Intermittent every 4 hours  betamethasone Injectable 12 milliGRAM(s) IntraMuscular every 24 hours  folic acid 1 milliGRAM(s) Oral daily  lactated ringers. 1000 milliLiter(s) (125 mL/Hr) IV Continuous <Continuous>  levothyroxine 50 MICROGram(s) Oral daily  NIFEdipine IR 10 milliGRAM(s) Oral every 6 hours  NIFEdipine XL 30 milliGRAM(s) Oral daily  prenatal multivitamin 1 Tablet(s) Oral daily  sodium chloride 0.9% lock flush 3 milliLiter(s) IV Push every 8 hours    MEDICATIONS  (PRN):  
R2 Antepartum Note, HD#4    Interval events: intermittent cramping    Patient seen and examined at bedside, no acute overnight events. No acute complaints.  Patient reports 3-4 times she appreciated cramping overnight.  No consistent ctx.   Pt reports +FM, denies LOF, VB, HA, epigastric pain, blurred vision, CP, SOB, N/V, fevers, and chills.    Vital Signs Last 24 Hours  T(C): 36.7 (06-24-21 @ 05:23), Max: 37.2 (06-23-21 @ 18:35)  HR: 65 (06-24-21 @ 05:23) (65 - 100)  BP: 145/71 (06-24-21 @ 05:23) (121/63 - 146/82)  RR: 18 (06-24-21 @ 05:23) (18 - 20)  SpO2: 97% (06-24-21 @ 05:23) (80% - 100%)    CAPILLARY BLOOD GLUCOSE          Physical Exam:  General: NAD  Abdomen: Soft, non-tender, gravid  Ext: No pain or swelling    NST reactive overnight    Labs:        PT/INR - ( 06-21 @ 15:22 )   PT: 10.9 sec;   INR: 0.90 ratio    PTT - ( 06-21 @ 15:22 )  PTT:28.1 sec    Uric Acid: (06-21 @ 15:22)  4.6      Fibrinogen: (06-21 @ 15:22)  564      LDH: (06-21 @ 15:22)  225        MEDICATIONS  (STANDING):  folic acid 1 milliGRAM(s) Oral daily  lactated ringers. 1000 milliLiter(s) (125 mL/Hr) IV Continuous <Continuous>  levothyroxine 50 MICROGram(s) Oral daily  prenatal multivitamin 1 Tablet(s) Oral daily  sodium chloride 0.9% lock flush 3 milliLiter(s) IV Push every 8 hours    MEDICATIONS  (PRN):  
Day 1 of Anesthesia Pain Management Service    SUBJECTIVE:  Pain Scale Score:          [X] Refer to charted pain scores    THERAPY:    s/p _0.1mg PF morphine on 6/25_      MEDICATIONS  (STANDING):  acetaminophen   Tablet .. 975 milliGRAM(s) Oral <User Schedule>  diphtheria/tetanus/pertussis (acellular) Vaccine (ADAcel) 0.5 milliLiter(s) IntraMuscular once  heparin   Injectable 5000 Unit(s) SubCutaneous every 12 hours  ibuprofen  Tablet. 600 milliGRAM(s) Oral every 6 hours  ketorolac   Injectable 30 milliGRAM(s) IV Push every 6 hours  lactated ringers. 1000 milliLiter(s) (125 mL/Hr) IV Continuous <Continuous>  levothyroxine 100 MICROGram(s) Oral daily  magnesium sulfate Infusion 2 Gm/Hr (50 mL/Hr) IV Continuous <Continuous>  morphine PF Spinal 0.1 milliGRAM(s) IntraThecal. once  oxytocin Infusion 333.333 milliUNIT(s)/Min (1000 mL/Hr) IV Continuous <Continuous>    MEDICATIONS  (PRN):  diphenhydrAMINE 25 milliGRAM(s) Oral every 6 hours PRN Pruritus  HYDROmorphone  Injectable 1 milliGRAM(s) IV Push every 3 hours PRN Severe Pain (7 - 10)  lanolin Ointment 1 Application(s) Topical every 6 hours PRN Sore Nipples  magnesium hydroxide Suspension 30 milliLiter(s) Oral two times a day PRN Constipation  nalbuphine Injectable 2.5 milliGRAM(s) IV Push every 6 hours PRN Pruritus  naloxone Injectable 0.1 milliGRAM(s) IV Push every 3 minutes PRN For ANY of the following changes in patient status:  A. RR LESS THAN 10 breaths per minute, B. Oxygen saturation LESS THAN 90%, C. Sedation score of 6  ondansetron Injectable 4 milliGRAM(s) IV Push every 6 hours PRN Nausea  oxyCODONE    IR 5 milliGRAM(s) Oral every 3 hours PRN Mild Pain (1 - 3)  oxyCODONE    IR 10 milliGRAM(s) Oral every 3 hours PRN Moderate Pain (4 - 6)  oxyCODONE    IR 5 milliGRAM(s) Oral every 3 hours PRN Moderate to Severe Pain (4-10)  oxyCODONE    IR 5 milliGRAM(s) Oral once PRN Moderate to Severe Pain (4-10)  simethicone 80 milliGRAM(s) Chew every 4 hours PRN Gas      OBJECTIVE:    Sedation:        	[X] Alert	[ ] Drowsy	[ ] Arousable      [ ] Asleep       [ ] Unresponsive    Side Effects:	[X] None	[ ] Nausea	[ ] Vomiting         [ ] Pruritus  		[ ] Weakness            [ ] Numbness	          [ ] Other:    Vital Signs Last 24 Hrs  T(C): 36.8 (26 Jun 2021 08:10), Max: 37.1 (25 Jun 2021 17:44)  T(F): 98.2 (26 Jun 2021 08:10), Max: 98.8 (25 Jun 2021 17:44)  HR: 84 (26 Jun 2021 10:00) (56 - 116)  BP: 116/74 (26 Jun 2021 10:00) (116/74 - 175/82)  BP(mean): 95 (26 Jun 2021 02:10) (95 - 116)  RR: 13 (26 Jun 2021 08:10) (12 - 20)  SpO2: 96% (26 Jun 2021 08:10) (95% - 100%)    ASSESSMENT/ PLAN  [X] Patient transitioned to prn analgesics  [X] Pain management per primary service, pain service to sign off   [X]Documentation and Verification of current medications
MFM Fellow Note     Patient seen at bedside. Patient overnight was noted to have contractions. She was examined and found to be close and was initiated on procardia for tocolysis. This morning patient denies feeling contractions. Denies headache, change in vision, upper epigastric pain, nausea, vomiting, chest pain and shortness of breathe.       Vital Signs Last 24 Hours  T(C): 36.6 (21 @ 08:36), Max: 37.1 (21 @ 23:40)  HR: 87 (21 @ 08:36) (67 - 115)  BP: 106/66 (21 @ 08:36) (99/61 - 159/89)  RR: 18 (21 @ 08:36) (16 - 18)  SpO2: 98% (21 @ 08:36) (92% - 100%)        Physical Exam:  General: NAD      Labs:                        12.1   8.54  )-----------( 281      ( 2021 15:23 )             36.9         136  |  102  |  9   ----------------------------<  75  3.7   |  19<L>  |  0.59    Ca    9.8      2021 15:22    TPro  7.0  /  Alb  3.6  /  TBili  0.2  /  DBili  x   /  AST  25  /  ALT  25  /  AlkPhos  137<H>      PT/INR - ( 2021 15:22 )   PT: 10.9 sec;   INR: 0.90 ratio         PTT - ( 2021 15:22 )  PTT:28.1 sec  Urinalysis Basic - ( 2021 15:22 )    Color: Colorless / Appearance: Clear / S.004 / pH: x  Gluc: x / Ketone: Negative  / Bili: Negative / Urobili: Negative   Blood: x / Protein: Negative / Nitrite: Negative   Leuk Esterase: Negative / RBC: x / WBC x   Sq Epi: x / Non Sq Epi: x / Bacteria: x    ATU sonogram today: 2168g 17% centile
OB Progress Note:  Delivery, POD#1    S: 41yo POD#1 s/p pLTCS 2/2 breech presentation c/b sPEC requiring Mg @ 34w6d. Her pain is well controlled. Denies HA, SOB, CP, RUQ/epigastric pain, b/l swelling LE and UE, or vision changes. She is tolerating a regular diet and not yet passing flatus. Denies N/V. Denies lightheadedness/dizziness. Endorses light vaginal bleeding, less than one pad per hour. She is ambulating without difficulty. Moncada in place.     O:   Vital Signs Last 24 Hrs  T(C): 36.8 (2021 05:56), Max: 37.1 (2021 17:44)  T(F): 98.3 (2021 05:56), Max: 98.8 (2021 17:44)  HR: 75 (2021 05:56) (56 - 116)  BP: 131/77 (2021 05:56) (117/73 - 175/82)  BP(mean): 95 (2021 02:10) (95 - 116)  RR: 14 (2021 05:56) (12 - 20)  SpO2: 95% (2021 05:56) (95% - 100%)    Labs:  Blood type: O Positive  Rubella IgG: RPR: Negative                          11.4<L>   17.47<H> >-----------< 247    (  @ 05:23 )             34.2<L>                        11.8   10.12 >-----------< 273    (  @ 18:24 )             36.3                        10.6<L>   11.92<H> >-----------< 260    (  @ 09:07 )             32.6<L>    21 @ 18:24      137  |  102  |  12  ----------------------------<  101<H>  3.9   |  21<L>  |  0.65    21 @ 09:07      137  |  105  |  13  ----------------------------<  95  3.7   |  21<L>  |  0.58        Ca    9.1      2021 18:24  Ca    8.4      2021 09:07    TPro  6.9  /  Alb  3.4  /  TBili  0.1<L>  /  DBili  x   /  AST  39  /  ALT  46<H>  /  AlkPhos  124<H>  21 @ 18:24  TPro  6.4  /  Alb  3.3  /  TBili  0.2  /  DBili  x   /  AST  32  /  ALT  29  /  AlkPhos  111  21 @ 09:07          PE:  General: NAD  Heart: extremities well-perfused  Lungs: breathing comfortably  Abdomen: Mildly distended, appropriately tender, incision c/d/i.  Extremities: No erythema, no pitting edema    
R2 Antepartum Note, HD#5    Interval events: none    Patient seen and examined at bedside, no acute overnight events. No acute complaints. Pt reports +FM, denies LOF, VB, ctx, HA, epigastric pain, blurred vision, CP, SOB, N/V, fevers, and chills.    Vital Signs Last 24 Hours  T(C): 36.7 (06-25-21 @ 05:34), Max: 37.1 (06-24-21 @ 13:10)  HR: 71 (06-25-21 @ 05:34) (64 - 87)  BP: 135/82 (06-25-21 @ 05:34) (110/67 - 138/79)  RR: 18 (06-25-21 @ 05:34) (18 - 18)  SpO2: 97% (06-25-21 @ 05:34) (97% - 99%)    CAPILLARY BLOOD GLUCOSE      Physical Exam:  General: NAD  Abdomen: Soft, non-tender, gravid  Ext: No pain or swelling    NST reactive overnight    Labs:             10.6   11.92 )-----------( 260      ( 06-24 @ 09:07 )             32.6     06-24 @ 09:07    137  |  105  |  13  ----------------------------<  95  3.7   |  21  |  0.58    Ca    8.4      06-24 @ 09:07    TPro  6.4  /  Alb  3.3  /  TBili  0.2  /  DBili  x   /  AST  32  /  ALT  29  /  AlkPhos  111  06-24 @ 09:07    PT/INR - ( 06-24 @ 09:07 )   PT: 10.5 sec;   INR: 0.87 ratio    PTT - ( 06-24 @ 09:07 )  PTT:19.9 sec    Uric Acid: (06-24 @ 09:07)  4.7      Fibrinogen: (06-24 @ 09:07)  396      LDH: (06-24 @ 09:07)  233        MEDICATIONS  (STANDING):  folic acid 1 milliGRAM(s) Oral daily  heparin   Injectable 5000 Unit(s) SubCutaneous every 12 hours  lactated ringers. 1000 milliLiter(s) (125 mL/Hr) IV Continuous <Continuous>  levothyroxine 100 MICROGram(s) Oral daily  levothyroxine 50 MICROGram(s) Oral daily  prenatal multivitamin 1 Tablet(s) Oral daily  sodium chloride 0.9% lock flush 3 milliLiter(s) IV Push every 8 hours    MEDICATIONS  (PRN):  
OB Postpartum Note:  Delivery    S: 41yo now POD #2 s/p LTCS c/b sPEC. Patient is now s/p 24h Mg infusion postpartum. Her pain is well controlled. She is tolerating a regular diet and is passing flatus. Voiding spontaneously and ambulating without difficulty. Denies SOB/CP, URQ pain, headache or vision changes. Denies N/V. Denies CP/SOB/lightheadedness/dizziness.     O:   Vitals:  Vital Signs Last 24 Hrs  T(C): 36.9 (2021 00:37), Max: 36.9 (2021 00:37)  T(F): 98.4 (2021 00:37), Max: 98.4 (2021 00:37)  HR: 81 (2021 00:37) (75 - 88)  BP: 117/73 (2021 00:37) (110/66 - 131/89)  BP(mean): --  RR: 18 (2021 00:37) (13 - 18)  SpO2: 96% (2021 00:37) (95% - 99%)    MEDICATIONS  (STANDING):  acetaminophen   Tablet .. 975 milliGRAM(s) Oral <User Schedule>  diphtheria/tetanus/pertussis (acellular) Vaccine (ADAcel) 0.5 milliLiter(s) IntraMuscular once  heparin   Injectable 5000 Unit(s) SubCutaneous every 12 hours  ibuprofen  Tablet. 600 milliGRAM(s) Oral every 6 hours  lactated ringers. 1000 milliLiter(s) (125 mL/Hr) IV Continuous <Continuous>  levothyroxine 100 MICROGram(s) Oral daily  morphine PF Spinal 0.1 milliGRAM(s) IntraThecal. once  oxytocin Infusion 333.333 milliUNIT(s)/Min (1000 mL/Hr) IV Continuous <Continuous>    MEDICATIONS  (PRN):  diphenhydrAMINE 25 milliGRAM(s) Oral every 6 hours PRN Pruritus  HYDROmorphone  Injectable 1 milliGRAM(s) IV Push every 3 hours PRN Severe Pain (7 - 10)  lanolin Ointment 1 Application(s) Topical every 6 hours PRN Sore Nipples  magnesium hydroxide Suspension 30 milliLiter(s) Oral two times a day PRN Constipation  nalbuphine Injectable 2.5 milliGRAM(s) IV Push every 6 hours PRN Pruritus  naloxone Injectable 0.1 milliGRAM(s) IV Push every 3 minutes PRN For ANY of the following changes in patient status:  A. RR LESS THAN 10 breaths per minute, B. Oxygen saturation LESS THAN 90%, C. Sedation score of 6  ondansetron Injectable 4 milliGRAM(s) IV Push every 6 hours PRN Nausea  oxyCODONE    IR 5 milliGRAM(s) Oral every 3 hours PRN Mild Pain (1 - 3)  oxyCODONE    IR 10 milliGRAM(s) Oral every 3 hours PRN Moderate Pain (4 - 6)  oxyCODONE    IR 5 milliGRAM(s) Oral every 3 hours PRN Moderate to Severe Pain (4-10)  oxyCODONE    IR 5 milliGRAM(s) Oral once PRN Moderate to Severe Pain (4-10)  simethicone 80 milliGRAM(s) Chew every 4 hours PRN Gas      Labs:  Blood type: O Positive  Rubella IgG: RPR: Negative                          11.4<L>   17.47<H> >-----------< 247    (  @ 05:23 )             34.2<L>                        11.8   10.12 >-----------< 273    (  @ 18:24 )             36.3                        10.6<L>   11.92<H> >-----------< 260    (  @ 09:07 )             32.6<L>    21 @ 05:23      135  |  101  |  11  ----------------------------<  120<H>  3.9   |  19<L>  |  0.51    21 @ 18:24      137  |  102  |  12  ----------------------------<  101<H>  3.9   |  21<L>  |  0.65    21 @ 09:07      137  |  105  |  13  ----------------------------<  95  3.7   |  21<L>  |  0.58        Ca    7.7<L>      2021 05:23  Ca    9.1      2021 18:24  Ca    8.4      2021 09:07  Mg     6.4<H>       Mg     5.8<H>       Mg     5.0<H>         TPro  6.1  /  Alb  3.1<L>  /  TBili  0.1<L>  /  DBili  x   /  AST  35  /  ALT  41  /  AlkPhos  106  21 @ 05:23  TPro  6.9  /  Alb  3.4  /  TBili  0.1<L>  /  DBili  x   /  AST  39  /  ALT  46<H>  /  AlkPhos  124<H>  21 @ 18:24  TPro  6.4  /  Alb  3.3  /  TBili  0.2  /  DBili  x   /  AST  32  /  ALT  29  /  AlkPhos  111  21 @ 09:07      PE:  General: NAD, patient resting comfortably in bed  Pulm: Respirations even, unlabored   Abdomen: Mildly distended, appropriately tender. No rebound tenderness or guarding. Incision c/d/i.  Extremities: SCDs in place, no erythema
OB Postpartum Note:  Delivery, POD#3    S: 41yo now POD #3 s/p LTCS c/b sPEC. Patient is now s/p 24h Mg infusion postpartum. Her pain is well controlled. She is tolerating a regular diet and is passing flatus. Voiding spontaneously and ambulating without difficulty. Denies SOB/CP, URQ pain, headache or vision changes. Denies N/V. Denies CP/SOB/lightheadedness/dizziness.     O:  Vitals:  Vital Signs Last 24 Hrs  T(C): 36.7 (2021 00:39), Max: 37.1 (2021 12:45)  T(F): 98.1 (2021 00:39), Max: 98.8 (2021 17:00)  HR: 82 (2021 00:39) (82 - 105)  BP: 132/77 (2021 00:39) (112/73 - 141/82)  BP(mean): --  RR: 18 (2021 00:39) (18 - 18)  SpO2: 97% (2021 00:39) (96% - 100%)    MEDICATIONS  (STANDING):  acetaminophen   Tablet .. 975 milliGRAM(s) Oral <User Schedule>  diphtheria/tetanus/pertussis (acellular) Vaccine (ADAcel) 0.5 milliLiter(s) IntraMuscular once  heparin   Injectable 5000 Unit(s) SubCutaneous every 12 hours  ibuprofen  Tablet. 600 milliGRAM(s) Oral every 6 hours  lactated ringers. 1000 milliLiter(s) (125 mL/Hr) IV Continuous <Continuous>  levothyroxine 100 MICROGram(s) Oral daily  oxytocin Infusion 333.333 milliUNIT(s)/Min (1000 mL/Hr) IV Continuous <Continuous>    MEDICATIONS  (PRN):  diphenhydrAMINE 25 milliGRAM(s) Oral every 6 hours PRN Pruritus  HYDROmorphone  Injectable 1 milliGRAM(s) IV Push every 3 hours PRN Severe Pain (7 - 10)  lanolin Ointment 1 Application(s) Topical every 6 hours PRN Sore Nipples  magnesium hydroxide Suspension 30 milliLiter(s) Oral two times a day PRN Constipation  nalbuphine Injectable 2.5 milliGRAM(s) IV Push every 6 hours PRN Pruritus  naloxone Injectable 0.1 milliGRAM(s) IV Push every 3 minutes PRN For ANY of the following changes in patient status:  A. RR LESS THAN 10 breaths per minute, B. Oxygen saturation LESS THAN 90%, C. Sedation score of 6  ondansetron Injectable 4 milliGRAM(s) IV Push every 6 hours PRN Nausea  oxyCODONE    IR 5 milliGRAM(s) Oral every 3 hours PRN Mild Pain (1 - 3)  oxyCODONE    IR 10 milliGRAM(s) Oral every 3 hours PRN Moderate Pain (4 - 6)  oxyCODONE    IR 5 milliGRAM(s) Oral every 3 hours PRN Moderate to Severe Pain (4-10)  oxyCODONE    IR 5 milliGRAM(s) Oral once PRN Moderate to Severe Pain (4-10)  simethicone 80 milliGRAM(s) Chew every 4 hours PRN Gas      LABS:  Blood type: O Positive  Rubella IgG: RPR: Negative                          10.3<L>   10.57<H> >-----------< 262    (  @ 07:10 )             30.0<L>                        11.4<L>   17.47<H> >-----------< 247    (  @ 05:23 )             34.2<L>                        11.8   10.12 >-----------< 273    (  @ 18:24 )             36.3    21 @ 12:40      127<L>  |  95<L>  |  14  ----------------------------<  85  3.9   |  21<L>  |  0.67    21 @ 07:10      130<L>  |  98  |  15  ----------------------------<  98  3.9   |  19<L>  |  0.86    21 @ 05:23      135  |  101  |  11  ----------------------------<  120<H>  3.9   |  19<L>  |  0.51    21 @ 18:24      137  |  102  |  12  ----------------------------<  101<H>  3.9   |  21<L>  |  0.65        Ca    7.4<L>      2021 12:40  Ca    6.9<L>      2021 07:10  Ca    7.7<L>      2021 05:23  Ca    9.1      2021 18:24  Mg     6.4<H>       Mg     5.8<H>       Mg     5.0<H>         TPro  5.6<L>  /  Alb  2.6<L>  /  TBili  0.1<L>  /  DBili  x   /  AST  29  /  ALT  32  /  AlkPhos  92  21 @ 07:10  TPro  6.1  /  Alb  3.1<L>  /  TBili  0.1<L>  /  DBili  x   /  AST  35  /  ALT  41  /  AlkPhos  106  21 @ 05:23  TPro  6.9  /  Alb  3.4  /  TBili  0.1<L>  /  DBili  x   /  AST  39  /  ALT  46<H>  /  AlkPhos  124<H>  21 @ 18:24          PE:  General: NAD, patient resting comfortably in bed  Pulm: Respirations even, unlabored   Abdomen: Mildly distended, appropriately tender. No rebound tenderness or guarding. Incision c/d/i.  Extremities: SCDs in place, no erythema      
OB Postpartum Note:  Delivery, POD#5    S: 41yo now POD #5 s/p LTCS c/b sPEC. Patient is now s/p 24h Mg infusion postpartum.  Denies SOB, RUQ pain, changes in vision, headache.  Her pain is well controlled. She is tolerating a regular diet and is passing flatus. Voiding spontaneously and ambulating without difficulty. Denies N/V. Denies CP/lightheadedness/dizziness.     O:  Vitals:  Vital Signs Last 24 Hrs  T(C): 36.6 (2021 00:30), Max: 36.9 (2021 12:31)  T(F): 97.9 (2021 00:30), Max: 98.4 (2021 12:31)  HR: 90 (2021 00:30) (72 - 92)  BP: 109/69 (2021 00:30) (109/69 - 153/83)  BP(mean): --  RR: 18 (2021 00:30) (16 - 18)  SpO2: 99% (2021 00:30) (97% - 99%)    MEDICATIONS  (STANDING):  acetaminophen   Tablet .. 975 milliGRAM(s) Oral <User Schedule>  diphtheria/tetanus/pertussis (acellular) Vaccine (ADAcel) 0.5 milliLiter(s) IntraMuscular once  heparin   Injectable 5000 Unit(s) SubCutaneous every 12 hours  ibuprofen  Tablet. 600 milliGRAM(s) Oral every 6 hours  lactated ringers. 1000 milliLiter(s) (125 mL/Hr) IV Continuous <Continuous>  levothyroxine 100 MICROGram(s) Oral daily  NIFEdipine XL 30 milliGRAM(s) Oral daily  oxytocin Infusion 333.333 milliUNIT(s)/Min (1000 mL/Hr) IV Continuous <Continuous>    MEDICATIONS  (PRN):  diphenhydrAMINE 25 milliGRAM(s) Oral every 6 hours PRN Pruritus  HYDROmorphone  Injectable 1 milliGRAM(s) IV Push every 3 hours PRN Severe Pain (7 - 10)  lanolin Ointment 1 Application(s) Topical every 6 hours PRN Sore Nipples  magnesium hydroxide Suspension 30 milliLiter(s) Oral two times a day PRN Constipation  nalbuphine Injectable 2.5 milliGRAM(s) IV Push every 6 hours PRN Pruritus  naloxone Injectable 0.1 milliGRAM(s) IV Push every 3 minutes PRN For ANY of the following changes in patient status:  A. RR LESS THAN 10 breaths per minute, B. Oxygen saturation LESS THAN 90%, C. Sedation score of 6  ondansetron Injectable 4 milliGRAM(s) IV Push every 6 hours PRN Nausea  oxyCODONE    IR 5 milliGRAM(s) Oral every 3 hours PRN Mild Pain (1 - 3)  oxyCODONE    IR 10 milliGRAM(s) Oral every 3 hours PRN Moderate Pain (4 - 6)  oxyCODONE    IR 5 milliGRAM(s) Oral every 3 hours PRN Moderate to Severe Pain (4-10)  oxyCODONE    IR 5 milliGRAM(s) Oral once PRN Moderate to Severe Pain (4-10)  simethicone 80 milliGRAM(s) Chew every 4 hours PRN Gas      LABS:  Blood type: O Positive  Rubella IgG: RPR: Negative                          10.7<L>   10.85<H> >-----------< 308    (  @ 07:03 )             33.0<L>                        10.3<L>   10.57<H> >-----------< 262    (  @ 07:10 )             30.0<L>    21 @ 07:00      139  |  102  |  14  ----------------------------<  90  3.9   |  22  |  0.64    21 @ 12:40      127<L>  |  95<L>  |  14  ----------------------------<  85  3.9   |  21<L>  |  0.67    21 @ 07:10      130<L>  |  98  |  15  ----------------------------<  98  3.9   |  19<L>  |  0.86        Ca    8.8      2021 07:00  Ca    7.4<L>      2021 12:40  Ca    6.9<L>      2021 07:10    TPro  6.6  /  Alb  3.2<L>  /  TBili  0.2  /  DBili  x   /  AST  94<H>  /  ALT  90<H>  /  AlkPhos  97  21 @ 07:00  TPro  5.6<L>  /  Alb  2.6<L>  /  TBili  0.1<L>  /  DBili  x   /  AST  29  /  ALT  32  /  AlkPhos  92  21 @ 07:10          PE:  General: NAD, patient resting comfortably in bed  Pulm: Respirations even, unlabored   Abdomen: Mildly distended, appropriately tender. No rebound tenderness or guarding. Incision c/d/i.  Extremities: SCDs in place, no erythema          
OB Postpartum Note:  Delivery, POD#6    S: 39yo POD#6 s/p LTCS c/b sPEC. Patient is now s/p 24h Mg infusion postpartum.  Denies SOB, RUQ pain, changes in vision, headache.  Her pain is well controlled. She is tolerating a regular diet and is passing flatus. Voiding spontaneously and ambulating without difficulty. Denies N/V. Denies CP/lightheadedness/dizziness.     O:  Vitals:  Vital Signs Last 24 Hrs  T(C): 37.1 (2021 00:31), Max: 37.1 (2021 00:31)  T(F): 98.7 (2021 00:31), Max: 98.7 (2021 00:)  HR: 95 (:) (75 - 99)  BP: 120/77 (2021 00:) (120/77 - 147/88)  BP(mean): --  RR: 18 (2021 00:) (18 - 18)  SpO2: 96% (2021 00:) (96% - 99%)    MEDICATIONS  (STANDING):  acetaminophen   Tablet .. 975 milliGRAM(s) Oral <User Schedule>  diphtheria/tetanus/pertussis (acellular) Vaccine (ADAcel) 0.5 milliLiter(s) IntraMuscular once  heparin   Injectable 5000 Unit(s) SubCutaneous every 12 hours  ibuprofen  Tablet. 600 milliGRAM(s) Oral every 6 hours  lactated ringers. 1000 milliLiter(s) (125 mL/Hr) IV Continuous <Continuous>  levothyroxine 100 MICROGram(s) Oral daily  NIFEdipine XL 30 milliGRAM(s) Oral daily  oxytocin Infusion 333.333 milliUNIT(s)/Min (1000 mL/Hr) IV Continuous <Continuous>    MEDICATIONS  (PRN):  diphenhydrAMINE 25 milliGRAM(s) Oral every 6 hours PRN Pruritus  HYDROmorphone  Injectable 1 milliGRAM(s) IV Push every 3 hours PRN Severe Pain (7 - 10)  lanolin Ointment 1 Application(s) Topical every 6 hours PRN Sore Nipples  magnesium hydroxide Suspension 30 milliLiter(s) Oral two times a day PRN Constipation  nalbuphine Injectable 2.5 milliGRAM(s) IV Push every 6 hours PRN Pruritus  naloxone Injectable 0.1 milliGRAM(s) IV Push every 3 minutes PRN For ANY of the following changes in patient status:  A. RR LESS THAN 10 breaths per minute, B. Oxygen saturation LESS THAN 90%, C. Sedation score of 6  ondansetron Injectable 4 milliGRAM(s) IV Push every 6 hours PRN Nausea  oxyCODONE    IR 5 milliGRAM(s) Oral every 3 hours PRN Mild Pain (1 - 3)  oxyCODONE    IR 10 milliGRAM(s) Oral every 3 hours PRN Moderate Pain (4 - 6)  oxyCODONE    IR 5 milliGRAM(s) Oral every 3 hours PRN Moderate to Severe Pain (4-10)  oxyCODONE    IR 5 milliGRAM(s) Oral once PRN Moderate to Severe Pain (4-10)  simethicone 80 milliGRAM(s) Chew every 4 hours PRN Gas      LABS:  Blood type: O Positive  Rubella IgG: RPR: Negative                          10.9<L>   13.52<H> >-----------< 366    (  @ 06:21 )             32.2<L>                        10.7<L>   10.85<H> >-----------< 308    (  @ 07:03 )             33.0<L>    21 @ 06:21      137  |  102  |  20  ----------------------------<  86  3.9   |  20<L>  |  0.61    21 @ 07:00      139  |  102  |  14  ----------------------------<  90  3.9   |  22  |  0.64        Ca    9.5      2021 06:21  Ca    8.8      2021 07:00    TPro  6.7  /  Alb  3.2<L>  /  TBili  0.2  /  DBili  x   /  AST  88<H>  /  ALT  141<H>  /  AlkPhos  109  21 @ 06:21  TPro  6.6  /  Alb  3.2<L>  /  TBili  0.2  /  DBili  x   /  AST  94<H>  /  ALT  90<H>  /  AlkPhos  97  21 @ 07:00          Physical exam:  Gen: NAD  Abdomen: Soft, nontender, no distension , firm uterine fundus at umbilicus.  Incision: Clean, dry, and intact   Pelvic: Normal lochia noted  Ext: No calf tenderness    
S/24 Events: Patient seen and examined. Denies CP, SOB, dizziness, LH, near syncope or sycope.   No acute events overnight.     O:  T(C): 36.8 (06-30-21 @ 14:52), Max: 36.9 (06-29-21 @ 20:30)  HR: 91 (06-30-21 @ 14:52) (75 - 91)  BP: 125/81 (06-30-21 @ 14:52) (109/69 - 139/80)  RR: 18 (06-30-21 @ 14:52) (18 - 18)  SpO2: 99% (06-30-21 @ 14:52) (98% - 99%)      Daily   Gen: NAD  HEENT: EOMI  CV: RRR, normal S1 + S2, no m/r/g  Lungs: CTAB  Abd: soft, non-tender  Ext: No edema    Labs:                        10.9   13.52 )-----------( 366      ( 30 Jun 2021 06:21 )             32.2     06-30    137  |  102  |  20  ----------------------------<  86  3.9   |  20<L>  |  0.61    Ca    9.5      30 Jun 2021 06:21    TPro  6.7  /  Alb  3.2<L>  /  TBili  0.2  /  DBili  x   /  AST  88<H>  /  ALT  141<H>  /  AlkPhos  109  06-30    PT/INR - ( 30 Jun 2021 06:21 )   PT: 10.4 sec;   INR: 0.86 ratio         PTT - ( 30 Jun 2021 06:21 )  PTT:31.0 sec       DIagnostics :   Echo - Pending      MEDICATIONS  (STANDING):  acetaminophen   Tablet .. 975 milliGRAM(s) Oral <User Schedule>  diphtheria/tetanus/pertussis (acellular) Vaccine (ADAcel) 0.5 milliLiter(s) IntraMuscular once  heparin   Injectable 5000 Unit(s) SubCutaneous every 12 hours  ibuprofen  Tablet. 600 milliGRAM(s) Oral every 6 hours  lactated ringers. 1000 milliLiter(s) (125 mL/Hr) IV Continuous <Continuous>  levothyroxine 100 MICROGram(s) Oral daily  NIFEdipine XL 30 milliGRAM(s) Oral daily  oxytocin Infusion 333.333 milliUNIT(s)/Min (1000 mL/Hr) IV Continuous <Continuous>      A/P: us        
S/24 Events: Patient seen and examined. Denies CP, SOB, dizziness, LH, near syncope or sycope.   No acute events overnight. Feels great. Ambulating well.   Telemetry : - Not on tele   O:  T(C): 36.8 (07-01-21 @ 09:30), Max: 37.3 (07-01-21 @ 06:24)  HR: 98 (07-01-21 @ 09:30) (80 - 99)  BP: 126/81 (07-01-21 @ 09:30) (114/78 - 147/88)  RR: 18 (07-01-21 @ 09:30) (18 - 18)  SpO2: 98% (07-01-21 @ 09:30) (96% - 99%)    Daily   Gen: NAD  HEENT: EOMI  CV: RRR, normal S1 + S2, no m/r/g  Lungs: CTAB  Abd: soft, non-tender  Ext: No edema    Labs:                        12.1   13.87 )-----------( 427      ( 01 Jul 2021 10:35 )             36.2     07-01    138  |  101  |  20  ----------------------------<  78  4.0   |  23  |  0.77    Ca    9.9      01 Jul 2021 10:35    TPro  7.8  /  Alb  3.9  /  TBili  0.2  /  DBili  x   /  AST  36  /  ALT  103<H>  /  AlkPhos  111  07-01    PT/INR - ( 01 Jul 2021 10:35 )   PT: 11.0 sec;   INR: 0.91 ratio         PTT - ( 01 Jul 2021 10:35 )  PTT:34.1 sec       DIagnostics : Pending     MEDICATIONS  (STANDING):  acetaminophen   Tablet .. 975 milliGRAM(s) Oral <User Schedule>  diphtheria/tetanus/pertussis (acellular) Vaccine (ADAcel) 0.5 milliLiter(s) IntraMuscular once  heparin   Injectable 5000 Unit(s) SubCutaneous every 12 hours  ibuprofen  Tablet. 600 milliGRAM(s) Oral every 6 hours  lactated ringers. 1000 milliLiter(s) (125 mL/Hr) IV Continuous <Continuous>  levothyroxine 100 MICROGram(s) Oral daily  NIFEdipine XL 30 milliGRAM(s) Oral daily  oxytocin Infusion 333.333 milliUNIT(s)/Min (1000 mL/Hr) IV Continuous <Continuous>      
MFM Fellow Note     Patient seen and examined at bedside. Patient was transferred to L&D for prolonged monitoring secondary to decelarations on NST.   Overnight tracing reviewied, and [id number], mod varibitliy, + accels and with intermittent late decelerations and one prolonged decelerations at 4 AM for a 5 minute deleceration.     Currently denies headache, change in vision, upper epigastric pain, nausea, vomiting, chest pain and shortness of breathe.       Vital Signs Last 24 Hours  T(C): 36.6 (21 @ 05:58), Max: 36.8 (21 @ 18:06)  HR: 74 (21 @ 12:56) (67 - 107)  BP: 131/72 (21 @ 12:39) (102/62 - 134/72)  RR: --  SpO2: 98% (21 @ 12:56) (80% - 100%)          Physical Exam:  General: NAD      Labs:                        12.1   8.54  )-----------( 281      ( 2021 15:23 )             36.9           136  |  102  |  9   ----------------------------<  75  3.7   |  19<L>  |  0.59    Ca    9.8      2021 15:22    TPro  7.0  /  Alb  3.6  /  TBili  0.2  /  DBili  x   /  AST  25  /  ALT  25  /  AlkPhos  137<H>      PT/INR - ( 2021 15:22 )   PT: 10.9 sec;   INR: 0.90 ratio         PTT - ( 2021 15:22 )  PTT:28.1 sec  Urinalysis Basic - ( 2021 15:22 )    Color: Colorless / Appearance: Clear / S.004 / pH: x  Gluc: x / Ketone: Negative  / Bili: Negative / Urobili: Negative   Blood: x / Protein: Negative / Nitrite: Negative   Leuk Esterase: Negative / RBC: x / WBC x   Sq Epi: x / Non Sq Epi: x / Bacteria: x        Blood Type: O Positive      MEDICATIONS  (STANDING):  folic acid 1 milliGRAM(s) Oral daily  lactated ringers. 1000 milliLiter(s) (125 mL/Hr) IV Continuous <Continuous>  levothyroxine 50 MICROGram(s) Oral daily  prenatal multivitamin 1 Tablet(s) Oral daily  sodium chloride 0.9% lock flush 3 milliLiter(s) IV Push every 8 hours    MEDICATIONS  (PRN):        Meredith Godoy MD
MFM Fellow Note    Patient seen and examined at bedside, no acute overnight events. Patient was transferred to [facility] after tracing remained category 1. Overnight NSTs had no further decelerations.   This morning she reports rare contractions, but otherwise has no complaints Denies vaginal bleeding leakage of fluid, headache, change in vision, upper epigastric pain, nausea, vomiting chest pain or shortness of breath.     Vital Signs Last 24 Hours  T(C): 36.9 (06-24-21 @ 10:27), Max: 37.2 (06-23-21 @ 18:35)  HR: 82 (06-24-21 @ 10:27) (65 - 100)  BP: 110/67 (06-24-21 @ 10:27) (110/67 - 146/82)  RR: 18 (06-24-21 @ 10:27) (18 - 20)  SpO2: 98% (06-24-21 @ 10:27) (91% - 100%)        Physical Exam:  General: NAD      Labs:                        10.6   11.92 )-----------( 260      ( 24 Jun 2021 09:07 )             32.6   baso 0.3    eos 0.0    imm gran 1.7    lymph 18.6   mono 9.4    poly 70.0                         12.1   8.54  )-----------( 281      ( 21 Jun 2021 15:23 )             36.9   baso 0.5    eos 0.9    imm gran 0.4    lymph 24.4   mono 8.0    poly 65.8     06-24    137  |  105  |  13  ----------------------------<  95  3.7   |  21<L>  |  0.58    Ca    8.4      24 Jun 2021 09:07    TPro  6.4  /  Alb  3.3  /  TBili  0.2  /  DBili  x   /  AST  32  /  ALT  29  /  AlkPhos  111  06-24    PT/INR - ( 24 Jun 2021 09:07 )   PT: 10.5 sec;   INR: 0.87 ratio         PTT - ( 24 Jun 2021 09:07 )  PTT:19.9 sec      Blood Type: O Positive      
OB Postpartum Note:  Delivery, POD#4    S: 39yo now POD #3 s/p LTCS c/b sPEC. Patient is now s/p 24h Mg infusion postpartum. Sever range BP last night requiring IV antihypertensive medication.  Denies SOB, RUQ pain, changes in vision, headache.  Her pain is well controlled. She is tolerating a regular diet and is passing flatus. Voiding spontaneously and ambulating without difficulty. Denies N/V. Denies CP/lightheadedness/dizziness.     O:  Vitals:  Vital Signs Last 24 Hrs  T(C): 36.8 (2021 00:55), Max: 37.2 (2021 21:30)  T(F): 98.2 (2021 00:55), Max: 99 (2021 21:30)  HR: 82 (2021 00:55) (63 - 82)  BP: 130/74 (2021 00:55) (130/74 - 164/92)  BP(mean): --  RR: 18 (2021 00:55) (18 - 18)  SpO2: 98% (2021 00:55) (98% - 99%)    MEDICATIONS  (STANDING):  acetaminophen   Tablet .. 975 milliGRAM(s) Oral <User Schedule>  diphtheria/tetanus/pertussis (acellular) Vaccine (ADAcel) 0.5 milliLiter(s) IntraMuscular once  heparin   Injectable 5000 Unit(s) SubCutaneous every 12 hours  ibuprofen  Tablet. 600 milliGRAM(s) Oral every 6 hours  lactated ringers. 1000 milliLiter(s) (125 mL/Hr) IV Continuous <Continuous>  levothyroxine 100 MICROGram(s) Oral daily  NIFEdipine XL 30 milliGRAM(s) Oral daily  oxytocin Infusion 333.333 milliUNIT(s)/Min (1000 mL/Hr) IV Continuous <Continuous>    MEDICATIONS  (PRN):  diphenhydrAMINE 25 milliGRAM(s) Oral every 6 hours PRN Pruritus  HYDROmorphone  Injectable 1 milliGRAM(s) IV Push every 3 hours PRN Severe Pain (7 - 10)  lanolin Ointment 1 Application(s) Topical every 6 hours PRN Sore Nipples  magnesium hydroxide Suspension 30 milliLiter(s) Oral two times a day PRN Constipation  nalbuphine Injectable 2.5 milliGRAM(s) IV Push every 6 hours PRN Pruritus  naloxone Injectable 0.1 milliGRAM(s) IV Push every 3 minutes PRN For ANY of the following changes in patient status:  A. RR LESS THAN 10 breaths per minute, B. Oxygen saturation LESS THAN 90%, C. Sedation score of 6  ondansetron Injectable 4 milliGRAM(s) IV Push every 6 hours PRN Nausea  oxyCODONE    IR 5 milliGRAM(s) Oral every 3 hours PRN Moderate to Severe Pain (4-10)  oxyCODONE    IR 5 milliGRAM(s) Oral once PRN Moderate to Severe Pain (4-10)  oxyCODONE    IR 5 milliGRAM(s) Oral every 3 hours PRN Mild Pain (1 - 3)  oxyCODONE    IR 10 milliGRAM(s) Oral every 3 hours PRN Moderate Pain (4 - 6)  simethicone 80 milliGRAM(s) Chew every 4 hours PRN Gas      LABS:  Blood type: O Positive  Rubella IgG: RPR: Negative                          10.3<L>   10.57<H> >-----------< 262    (  @ 07:10 )             30.0<L>                        11.4<L>   17.47<H> >-----------< 247    (  @ 05:23 )             34.2<L>    21 @ 12:40      127<L>  |  95<L>  |  14  ----------------------------<  85  3.9   |  21<L>  |  0.67    21 @ 07:10      130<L>  |  98  |  15  ----------------------------<  98  3.9   |  19<L>  |  0.86    21 @ 05:23      135  |  101  |  11  ----------------------------<  120<H>  3.9   |  19<L>  |  0.51        Ca    7.4<L>      2021 12:40  Ca    6.9<L>      2021 07:10  Ca    7.7<L>      2021 05:23  Mg     6.4<H>       Mg     5.8<H>       Mg     5.0<H>         TPro  5.6<L>  /  Alb  2.6<L>  /  TBili  0.1<L>  /  DBili  x   /  AST  29  /  ALT  32  /  AlkPhos  92  21 @ 07:10  TPro  6.1  /  Alb  3.1<L>  /  TBili  0.1<L>  /  DBili  x   /  AST  35  /  ALT  41  /  AlkPhos  106  21 @ 05:23      PE:  General: NAD, patient resting comfortably in bed  Pulm: Respirations even, unlabored   Abdomen: Mildly distended, appropriately tender. No rebound tenderness or guarding. Incision c/d/i.  Extremities: SCDs in place, no erythema    
R2 Antepartum Note, HD#3    Interval events: monitored in L&D overnight, 1x prolonged decel overnight    Patient seen and examined at bedside.  No acute complaints. Pt reports +FM, denies LOF, VB, ctx, HA, epigastric pain, blurred vision, CP, SOB, N/V, fevers, and chills.    Vital Signs Last 24 Hours  T(C): 36.6 (06-23-21 @ 05:58), Max: 36.8 (06-22-21 @ 13:14)  HR: 80 (06-23-21 @ 07:37) (67 - 107)  BP: 129/71 (06-23-21 @ 07:37) (102/62 - 132/69)  RR: 18 (06-22-21 @ 08:36) (18 - 18)  SpO2: 96% (06-23-21 @ 07:34) (90% - 100%)    CAPILLARY BLOOD GLUCOSE      Physical Exam:  General: NAD  Abdomen: Soft, non-tender, gravid  Ext: No pain or swelling        Labs:             12.1   8.54  )-----------( 281      ( 06-21 @ 15:23 )             36.9     06-21 @ 15:22    136  |  102  |  9   ----------------------------<  75  3.7   |  19  |  0.59    Ca    9.8      06-21 @ 15:22    TPro  7.0  /  Alb  3.6  /  TBili  0.2  /  DBili  x   /  AST  25  /  ALT  25  /  AlkPhos  137  06-21 @ 15:22    PT/INR - ( 06-21 @ 15:22 )   PT: 10.9 sec;   INR: 0.90 ratio    PTT - ( 06-21 @ 15:22 )  PTT:28.1 sec    Uric Acid: (06-21 @ 15:22)  4.6      Fibrinogen: (06-21 @ 15:22)  564      LDH: (06-21 @ 15:22)  225        MEDICATIONS  (STANDING):  folic acid 1 milliGRAM(s) Oral daily  lactated ringers. 1000 milliLiter(s) (125 mL/Hr) IV Continuous <Continuous>  levothyroxine 50 MICROGram(s) Oral daily  prenatal multivitamin 1 Tablet(s) Oral daily  sodium chloride 0.9% lock flush 3 milliLiter(s) IV Push every 8 hours    MEDICATIONS  (PRN):  
[id number] [id number]    Subjective:   Patient seen and examined at bedside, sent from Antepartum to triage 2/2 non recurrent late dcels with ctx seen on NST.  PEC: denies HA, vision changes, n/v, no RUQ or epigastric pain. Does feel ctx but not painfully.      T(C): 36.8 (06-22-21 @ 13:14), Max: 37.1 (06-21-21 @ 23:40)  HR: 81 (06-22-21 @ 16:46) (67 - 115)  BP: 129/65 (06-22-21 @ 16:10) (99/61 - 141/76)  RR: 18 (06-22-21 @ 08:36) (16 - 18)  SpO2: 97% (06-22-21 @ 16:46) (92% - 100%)    focused PE:   Gen: NAD  abd: soft nt nd, gravid  ext: nt calves, 1+ edema                          12.1   8.54  )-----------( 281      ( 21 Jun 2021 15:23 )             36.9   06-21    136  |  102  |  9   ----------------------------<  75  3.7   |  19<L>  |  0.59    Ca    9.8      21 Jun 2021 15:22    TPro  7.0  /  Alb  3.6  /  TBili  0.2  /  DBili  x   /  AST  25  /  ALT  25  /  AlkPhos  137<H>  06-21          Assessment/Plan: 40y  VD:  Regular diet, po analgesia, routine post partum care    CS: ADAT, po pain control, encourage ambulation     DVT PPx - SQ heparin, SCDs, Ambulation

## 2021-07-01 NOTE — PROGRESS NOTE ADULT - ASSESSMENT
41yo POD#6 s/p pLTCS @34w6d 2/2 breech presentation c/b sPEC requiring Mg. BP controlled on PO medication.  Otherwise stable and reaching post-operative milestones      #sPEC  - s/p Lab 20/40 IVP (6/28 PM)  - started on Procardia 30XL qD (6/28 PM)  - S/p Mag x24 for seizure ppx  - BP and symptom monitoring  - Mild elevation in LFTs  - Cardio OB email to be sent    #PP care  - Continue regular diet.  - Increase ambulation.  - Continue motrin, tylenol, oxycodone PRN for pain control.   - HSQ, venodynes for dvt ppx  - Discharge planning    Tavon Ly, PGY3

## 2021-07-01 NOTE — PROGRESS NOTE ADULT - PROVIDER SPECIALTY LIST ADULT
Anesthesia
Cardio-OB
MFELISABETH
OB
Cardio-OB
MFELISABETH
MFELISABETH
OB
MFELISABETH
OB

## 2021-07-01 NOTE — PROGRESS NOTE ADULT - ATTENDING COMMENTS
Agree with above. Pending repeat labs and evaluation marci discharge today.
I have seen and examined this patient.  I agree with the above assessment and plan.  She feels well, is ambulating, tolerating PO, pain controlled, denies HA, visual changes or epigastric pain.      Will continue to monitor in house today due to preeclampsia on this admission.   Plan for discharge tomorrow    Hedy Tolentino MD
MFM Attending    Patient evaluated and counseled at the bedside this morning.  Agree with the assessment and plan above.    Pt appears to have gestational hypertension given negative urine P:C, though confirming with 24hr urine collection.  No evidence of severe disease thus far, however BPs have been lowered by use of nifedipine for  contractions.  As stated above, would recommend d/c of nifedipine given no evidence of PTL and to allow for more accurate evaluation for any severe HTN.  She will be steroid complete tomorrow, at which time we can reassess her candidacy for outpatient management.
MFM Attending    Pt evaluated and counseled on antepartum rounds on 3KN.    39yo P0 @ 34 5/7 weeks HD 4 admitted because of new onset hypertension with evaluation revealing gestational hypertension.  No evidence of severe disease.     Pt was transferred to L&D two days ago after late decelerations noted on EFM.  Overnight the tracing was generally reactive without decelerations, however she has had a rare spontaneous deceleration and very infrequent late decelerations.  She was transferred back to 3KN yesterday evening, and her NSTs have been normal since then.    Will continue to monitor with three times weekly NSTs.  If they remain normal will consider transition to outpatient management.  Goal for delivery is 37 weeks, unless there is evidence of severe disease.  Breech on most recent ultrasound so for primary  at this time.     Discussed case and plan with covering OB Dr. Watkins.
POD 2 Pt seen and examined, doing well. agree with resident note above. AM HELLP labs reviewed, appropriate. BPs stable on no medications. COntinue close monitoring.
Pt seen and examined, agree with resident note above. BPs under better control on procardia, still occasional elevation. LFTs still trending upward - to be expected. Not at HELLP levels. Pt otherwise stable. Continue monitoring and start d/c planning.
S/P   doing well   cont post op care  cont mag x 24 hrs from starting  follow BP and labs closely
MFM Attending    Pt evaluated and counseled at the bedside on L&D.    39yo P0 @ 34 4/7 weeks admitted with new onset hypertension evaluation consistent with gestational hypertension.  No evidence of severe disease.     Pt was transferred to L&D yesterday after late decelerations noted on EFM.  Overnight the tracing was generally reactive without decelerations, however she has had a rare spontaneous deceleration and very infrequent late decelerations.      As of this afternoon she has had a reactive tracing without decelerations for many hours.  I believe she is stable for transfer to [facility].      Will continue to monitor with three times weekly NSTs.  If they remain normal will consider transition to outpatient management.  Goal for delivery is 37 weeks.  Breech on most recent ultrasound so for primary  at this time.
Pt seen and examined, agree with resident note above. Occasional ctx overnight, no dcels on NST. Continue NST TID. If stable, reassess posibilty for discharge at 35 wks, Per [facility] delivery no sooner than 37 weeks.
Pt seen chart rev. As per above note. Agree with management and plan.  Will await MFM imput. Would transfer to floor with several times a day NST and daily BPP.
Pt seen chart rev. As per above note. Agree with management and plan.  agree with above. Will cont to monitor BP and recheck labs in am  Follow BP closely now on procardia, ask cardiology to see pt to help manage BP.
MFM Attending    Pt evaluated and counseled on antepartum rounds on 3KN.    39yo P0 @ 34 6/7 weeks HD 4 admitted because of new onset hypertension with evaluation revealing gestational hypertension.      She has no symptoms.  Her BPs have generally been in the normal range until this afternoon when she had one severe range blood pressure (167/90) followed by a mild range (156/79).      The fetal heart rate tracings have been reactive without decelerations on 3KN.    Will repeat laboratory studies tonight given recent BPs.  Will continue to monitor for evidence of severe disease.  At this time goal is expectant management to 37 weeks.  Recommend inpatient management at this time.

## 2021-07-01 NOTE — PROGRESS NOTE ADULT - ASSESSMENT
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     Recommendations Continue Procardia XL 30 mg QD   May titrate up if BP > 140/90   Maintain K >4.0 and Mg<> 1.8  Echocardiogram to evaluate wall motion abnormality and valvular function Pending   Encouraged patient to continue healthy diet, focusing on [place] style  Follow up in [facility] clinic 1-2 weeks upon discharge.     Stable for discharge from Cardio - Ob stand point.     Thanks,   DANIA Wheeler-C

## 2021-07-01 NOTE — PROGRESS NOTE ADULT - NSICDXPILOT_GEN_ALL_CORE
[place]

## 2021-07-06 ENCOUNTER — NON-APPOINTMENT (OUTPATIENT)
Age: 41
End: 2021-07-06

## 2021-07-06 ENCOUNTER — APPOINTMENT (OUTPATIENT)
Dept: OBGYN | Facility: CLINIC | Age: 41
End: 2021-07-06
Payer: COMMERCIAL

## 2021-07-06 ENCOUNTER — APPOINTMENT (OUTPATIENT)
Dept: CARDIOLOGY | Facility: CLINIC | Age: 41
End: 2021-07-06
Payer: COMMERCIAL

## 2021-07-06 DIAGNOSIS — Z83.438 FAMILY HISTORY OF OTHER DISORDER OF LIPOPROTEIN METABOLISM AND OTHER LIPIDEMIA: ICD-10-CM

## 2021-07-06 DIAGNOSIS — O14.13 SEVERE PRE-ECLAMPSIA, THIRD TRIMESTER: ICD-10-CM

## 2021-07-06 DIAGNOSIS — Z82.49 FAMILY HISTORY OF ISCHEMIC HEART DISEASE AND OTHER DISEASES OF THE CIRCULATORY SYSTEM: ICD-10-CM

## 2021-07-06 DIAGNOSIS — R30.0 DYSURIA: ICD-10-CM

## 2021-07-06 LAB
BILIRUB UR QL STRIP: NORMAL
CLARITY UR: CLEAR
COLLECTION METHOD: NORMAL
GLUCOSE UR-MCNC: NORMAL
HCG UR QL: 0.2 EU/DL
HGB UR QL STRIP.AUTO: NORMAL
KETONES UR-MCNC: NORMAL
LEUKOCYTE ESTERASE UR QL STRIP: NORMAL
NITRITE UR QL STRIP: NORMAL
PH UR STRIP: 5.5
PROT UR STRIP-MCNC: NORMAL
SP GR UR STRIP: 1

## 2021-07-06 PROCEDURE — 81003 URINALYSIS AUTO W/O SCOPE: CPT | Mod: QW

## 2021-07-06 PROCEDURE — 99212 OFFICE O/P EST SF 10 MIN: CPT | Mod: 95

## 2021-07-06 PROCEDURE — 0503F POSTPARTUM CARE VISIT: CPT

## 2021-07-07 LAB
BACTERIA UR CULT: NORMAL
BILIRUB UR QL STRIP: NORMAL
BILIRUB UR QL STRIP: NORMAL
CLARITY UR: CLEAR
CLARITY UR: CLEAR
COLLECTION METHOD: NORMAL
COLLECTION METHOD: NORMAL
GLUCOSE UR-MCNC: NORMAL
GLUCOSE UR-MCNC: NORMAL
HCG UR QL: 0.2 EU/DL
HCG UR QL: 0.2 EU/DL
HGB UR QL STRIP.AUTO: NORMAL
HGB UR QL STRIP.AUTO: NORMAL
KETONES UR-MCNC: NORMAL
KETONES UR-MCNC: NORMAL
LEUKOCYTE ESTERASE UR QL STRIP: NORMAL
LEUKOCYTE ESTERASE UR QL STRIP: NORMAL
NITRITE UR QL STRIP: NORMAL
NITRITE UR QL STRIP: NORMAL
PH UR STRIP: 6
PH UR STRIP: 6.5
PROT UR STRIP-MCNC: NORMAL
PROT UR STRIP-MCNC: NORMAL
SP GR UR STRIP: 1.02
SP GR UR STRIP: 1.02

## 2021-07-11 PROBLEM — Z83.438 FAMILY HISTORY OF HYPERLIPIDEMIA: Status: ACTIVE | Noted: 2020-05-05

## 2021-07-11 PROBLEM — O14.13 SEVERE PRE-ECLAMPSIA IN THIRD TRIMESTER: Status: RESOLVED | Noted: 2021-07-06 | Resolved: 2021-07-11

## 2021-07-11 PROBLEM — Z82.49 FAMILY HISTORY OF HYPERTENSION: Status: ACTIVE | Noted: 2020-05-05

## 2021-07-11 NOTE — ASSESSMENT
[FreeTextEntry1] : 40 year old female, originally from [facility] with history of hypothyroidism and  a recent emergency  at 34 weeks and 6 days for preeclampsia and hypertension. Treated with IV Magnesium X 24 hours. \par \par She carries a strong family history of both maternal and paternal hypertension.\par \par Patient presents via telehealth to the [facility] for blood pressure management and cardiovascular screening and risk assessment. . \par \par #Gestational Hypertension\par   Blood pressure is currently in good control\par   Continue on Nifedipine ER 30mg daily \par   Advised patient to hold medication for systolic pressures below 110- 115\par \par #Hypothyroidism\par   Continue on Levothyroxine 50 mg daily\par \par #Adverse Cardiovascular Risk Factors\par   Will schedule one in-office visit for physical exam\par   EKG, exercise stress testing for cardiac evaluation of structure and function\par   in three months\par \par Full blood work panel: CBC, CMP, Hgb A1C, TSH, Lipid panel\par

## 2021-07-11 NOTE — HISTORY OF PRESENT ILLNESS
[Home] : at home, [unfilled] , at the time of the visit. [Other Location: e.g. Home (Enter Location, City,State)___] : at [unfilled] [FreeTextEntry1] : 40 year old female, originally from [facility] with history of hypothyroidism and  a recent emergency  at 34 weeks and 6 days for preeclampsia and hypertension. Treated with IV Magnesium X 24 hours. \par \par She carries a strong family history of both maternal and paternal hypertension.\par \par Patient presents via telehealth to the [facility] for blood pressure management and cardiovascular screening and risk assessment. . \par \par Her pregnancy history is as follows:\par \par 1st pregnancy-  \par termination\par \par 2nd pregnancy- 2020\par early miscarriage: 5 weeks\par \par 3rd pregnancy- 2020\par Delivered at 34 weeks and 6 days: Baby girl\par by  on 2021.\par \par *Currently breast feeding her .\par \par Patient reports feeling "OK" today. Blood pressure is within acceptable limits: 128- 130/82\par Denies any issues with shortness of breath, chest discomfort or palpitations.\par \par *Of note, patient has received the 1st dose of Pfizer vaccine, second dose scheduled for 2021\par

## 2021-07-24 ENCOUNTER — APPOINTMENT (OUTPATIENT)
Dept: DISASTER EMERGENCY | Facility: OTHER | Age: 41
End: 2021-07-24

## 2021-08-05 ENCOUNTER — NON-APPOINTMENT (OUTPATIENT)
Age: 41
End: 2021-08-05

## 2021-08-06 ENCOUNTER — APPOINTMENT (OUTPATIENT)
Dept: OBGYN | Facility: CLINIC | Age: 41
End: 2021-08-06
Payer: COMMERCIAL

## 2021-08-06 PROCEDURE — 0503F POSTPARTUM CARE VISIT: CPT

## 2021-08-08 LAB — HPV HIGH+LOW RISK DNA PNL CVX: NOT DETECTED

## 2021-08-09 ENCOUNTER — OUTPATIENT (OUTPATIENT)
Dept: OUTPATIENT SERVICES | Facility: HOSPITAL | Age: 41
LOS: 1 days | End: 2021-08-09
Payer: COMMERCIAL

## 2021-08-09 ENCOUNTER — APPOINTMENT (OUTPATIENT)
Dept: CV DIAGNOSITCS | Facility: HOSPITAL | Age: 41
End: 2021-08-09

## 2021-08-09 DIAGNOSIS — K08.409 PARTIAL LOSS OF TEETH, UNSPECIFIED CAUSE, UNSPECIFIED CLASS: Chronic | ICD-10-CM

## 2021-08-09 DIAGNOSIS — Z98.890 OTHER SPECIFIED POSTPROCEDURAL STATES: Chronic | ICD-10-CM

## 2021-08-09 DIAGNOSIS — Z34.90 ENCOUNTER FOR SUPERVISION OF NORMAL PREGNANCY, UNSPECIFIED, UNSPECIFIED TRIMESTER: ICD-10-CM

## 2021-08-09 PROCEDURE — 93306 TTE W/DOPPLER COMPLETE: CPT

## 2021-08-09 PROCEDURE — 93306 TTE W/DOPPLER COMPLETE: CPT | Mod: 26

## 2021-08-13 LAB — CYTOLOGY CVX/VAG DOC THIN PREP: NORMAL

## 2021-09-09 LAB
T4 FREE SERPL-MCNC: 2 NG/DL
TSH SERPL-ACNC: <0.01 UIU/ML

## 2021-09-10 LAB
GLUCOSE 2H P 100 G GLC PO SERPL-MCNC: 125 MG/DL
GLUCOSE BS SERPL-MCNC: 86 MG/DL

## 2021-09-13 ENCOUNTER — APPOINTMENT (OUTPATIENT)
Dept: CARDIOLOGY | Facility: CLINIC | Age: 41
End: 2021-09-13
Payer: COMMERCIAL

## 2021-09-13 ENCOUNTER — NON-APPOINTMENT (OUTPATIENT)
Age: 41
End: 2021-09-13

## 2021-09-13 VITALS — HEART RATE: 110 BPM | OXYGEN SATURATION: 97 % | DIASTOLIC BLOOD PRESSURE: 83 MMHG | SYSTOLIC BLOOD PRESSURE: 134 MMHG

## 2021-09-13 DIAGNOSIS — Z00.00 ENCOUNTER FOR GENERAL ADULT MEDICAL EXAMINATION W/OUT ABNORMAL FINDINGS: ICD-10-CM

## 2021-09-13 PROCEDURE — 99215 OFFICE O/P EST HI 40 MIN: CPT

## 2021-09-13 PROCEDURE — 93000 ELECTROCARDIOGRAM COMPLETE: CPT

## 2021-09-25 NOTE — ASSESSMENT
[FreeTextEntry1] : \par \par \par \par Full blood work panel: CBC, CMP, Hgb A1C, TSH, Lipid panel\par

## 2021-09-25 NOTE — HISTORY OF PRESENT ILLNESS
Gastroesophageal Reflux Scan  A gastroesophageal reflux scan is a procedure that checks for the backward flow of stomach contents into the esophagus (gastroesophageal reflux). The esophagus is the tube that carries food from your mouth to your stomach. The scan can also show if any stomach contents have been inhaled (aspirated) into your lungs.  You may need this scan if you have symptoms such as heartburn, vomiting, swallowing problems, or regurgitation. Regurgitation means that swallowed food is returning from the stomach to the esophagus.  For this scan, you will drink a liquid that contains a small amount of a radioactive substance (tracer). A scanner with a camera that detects the tracer will show if any of the material flows backward into your esophagus.  Tell a health care provider about:  · Any allergies you have.  · All medicines you are taking, including vitamins, herbs, eye drops, creams, and over-the-counter medicines.  · Any blood disorders you have.  · Any surgeries you have had.  · Any medical conditions you have.  · Whether you are pregnant or may be pregnant.  · Whether you are breastfeeding.  What are the risks?  Generally, this is a safe procedure. However, problems may occur, including:  · Exposure to radiation (a small amount of it).  · Allergic reaction to the radioactive substance. This is rare, but it may include swelling of the tongue and difficulty breathing.  What happens before the procedure?  · Ask your health care provider about changing or stopping your regular medicines.  · Follow instructions from your health care provider about eating or drinking restrictions.  What happens during the procedure?    · You will drink a liquid that contains a small amount of a radioactive tracer. This liquid will probably be similar to orange juice.  · You will lie on your back.  · A series of images will be taken of your esophagus and upper stomach.  · You may be asked to move into different positions  to help check whether reflux occurs more often when you are in specific positions.  · For adults, an abdominal binder with an inflatable cuff may be placed on the abdomen. This may be used to increase abdominal pressure. More images will be taken to see if the increased pressure causes reflux to occur.  The procedure may vary among health care providers and hospitals.  What can I expect after the procedure?    · Return to your normal activities and diet as told by your health care provider.  · The radioactive tracer will leave your body over the next few days. Drink enough fluid to keep your urine pale yellow. This will help to flush the tracer out of your body.  · It is up to you to get the results of your procedure. Ask your health care provider, or the department that is doing the procedure, when and how you will get your results.  Summary  · A gastroesophageal reflux scan is a procedure that checks for the backward flow of stomach contents into the esophagus (gastroesophageal reflux).  · You may need this scan if you have symptoms such as heartburn, vomiting, swallowing problems, or regurgitation.  · Before the procedure, follow your health care provider's instructions about eating or drinking restrictions and whether you should take your regular medicines.  · For the procedure, you will drink a liquid that contains a small amount of a radioactive substance (tracer). A scanner with a camera that detects the tracer will show if any of the material flows backward into your esophagus.  This information is not intended to replace advice given to you by your health care provider. Make sure you discuss any questions you have with your health care provider.  Document Revised: 11/30/2018 Document Reviewed: 11/28/2018  [employer] Patient Education © 2020 [employer].    Cha[place]in Diet  A bland diet consists of foods that are often soft and do not have a lot of fat, fiber, or extra seasonings. Foods without fat, fiber, or  seasoning are easier for the body to digest. They are also less likely to irritate your mouth, throat, stomach, and other parts of your digestive system. A bland diet is sometimes called a BRAT diet.  What is my plan?  Your health care provider or food and nutrition specialist (dietitian) may recommend specific changes to your diet to prevent symptoms or to treat your symptoms. These changes may include:  · Eating small meals often.  · Cooking food until it is soft enough to chew easily.  · Chewing your food well.  · Drinking fluids slowly.  · Not eating foods that are very spicy, sour, or fatty.  · Not eating citrus fruits, such as oranges and grapefruit.  What do I need to know about this diet?  · Eat a variety of foods from the bland diet food list.  · Do not follow a bland diet longer than needed.  · Ask your health care provider whether you should take vitamins or supplements.  What foods can I eat?  Grains    Hot cereals, such as cream of wheat. Rice. Bread, crackers, or tortillas made from refined white flour.  Vegetables  Canned or cooked vegetables. Mashed or boiled potatoes.  Fruits    Bananas. Applesauce. Other types of cooked or canned fruit with the skin and seeds removed, such as canned peaches or pears.  Meats and other proteins    Scrambled eggs. Creamy peanut butter or other nut butters. Lean, well-cooked meats, such as chicken or fish. Tofu. Soups or broths.  Dairy  Low-fat dairy products, such as milk, cottage cheese, or yogurt.  Beverages    Water. Herbal tea. Apple juice.  Fats and oils  Mild salad dressings. Canola or olive oil.  Sweets and desserts  Pudding. Custard. Fruit gelatin. Ice cream.  The items listed above may not be a complete list of recommended foods and beverages. Contact a dietitian for more options.  What foods are not recommended?  Grains  Whole grain breads and cereals.  Vegetables  Raw vegetables.  Fruits  Raw fruits, especially citrus, berries, or dried  fruits.  Dairy  Whole fat dairy foods.  Beverages  Caffeinated drinks. Alcohol.  Seasonings and condiments  Strongly flavored seasonings or condiments. Hot sauce. Salsa.  Other foods  Spicy foods. Fried foods. Sour foods, such as pickled or fermented foods. Foods with high sugar content. Foods high in fiber.  The items listed above may not be a complete list of foods and beverages to avoid. Contact a dietitian for more information.  Summary  · A bland diet consists of foods that are often soft and do not have a lot of fat, fiber, or extra seasonings.  · Foods without fat, fiber, or seasoning are easier for the body to digest.  · Check with your health care provider to see how long you should follow this diet plan. It is not meant to be followed for long periods.  This information is not intended to replace advice given to you by your health care provider. Make sure you discuss any questions you have with your health care provider.  Document Revised: 01/16/2019 Document Reviewed: 01/16/2019  [employer] Patient Education © 2020 [employer]    Low-Sodium Eating Plan  Sodium, which is an element that makes up salt, helps you maintain a healthy balance of fluids in your body. Too much sodium can increase your blood pressure and cause fluid and waste to be held in your body.  Your health care provider or dietitian may recommend following this plan if you have high blood pressure (hypertension), kidney disease, liver disease, or heart failure. Eating less sodium can help lower your blood pressure, reduce swelling, and protect your heart, liver, and kidneys.  What are tips for following this plan?  General guidelines  · Most people on this plan should limit their sodium intake to 1,500-2,000 mg (milligrams) of sodium each day.  Reading food labels    · The Nutrition Facts label lists the amount of sodium in one serving of the food. If you eat more than one serving, you must multiply the listed amount of sodium by the number  "of servings.  · Choose foods with less than 140 mg of sodium per serving.  · Avoid foods with 300 mg of sodium or more per serving.  Shopping  · Look for lower-sodium products, often labeled as \"low-sodium\" or \"no salt added.\"  · Always check the sodium content even if foods are labeled as \"unsalted\" or \"no salt added\".  · Buy fresh foods.  ? Avoid canned foods and premade or frozen meals.  ? Avoid canned, cured, or processed meats  · Buy breads that have less than 80 mg of sodium per slice.  Cooking  · Eat more home-cooked food and less restaurant, buffet, and fast food.  · Avoid adding salt when cooking. Use salt-free seasonings or herbs instead of table salt or sea salt. Check with your health care provider or pharmacist before using salt substitutes.  · Cook with plant-based oils, such as canola, sunflower, or olive oil.  Meal planning  · When eating at a restaurant, ask that your food be prepared with less salt or no salt, if possible.  · Avoid foods that contain MSG (monosodium glutamate). MSG is sometimes added to Chinese food, bouillon, and some canned foods.  What foods are recommended?  The items listed may not be a complete list. Talk with your dietitian about what dietary choices are best for you.  Grains  Low-sodium cereals, including oats, puffed wheat and rice, and shredded wheat. Low-sodium crackers. Unsalted rice. Unsalted pasta. Low-sodium bread. Whole-grain breads and whole-grain pasta.  Vegetables  Fresh or frozen vegetables. \"No salt added\" canned vegetables. \"No salt added\" tomato sauce and paste. Low-sodium or reduced-sodium tomato and vegetable juice.  Fruits  Fresh, frozen, or canned fruit. Fruit juice.  Meats and other protein foods  Fresh or frozen (no salt added) meat, poultry, seafood, and fish. Low-sodium canned tuna and salmon. Unsalted nuts. Dried peas, beans, and lentils without added salt. Unsalted canned beans. Eggs. Unsalted nut butters.  Dairy  Milk. Soy milk. Cheese that is " [FreeTextEntry1] : 40 year old female originally from [facility] with history of hypothyroidism and  a recent emergency  at 34 weeks and 6 days for preeclampsia and hypertension. Treated with IV Magnesium X 24 hours. \par She carries a strong family history of both maternal and paternal hypertension.\par BP at home 110-120/70's. \par \par OB History - 1st pregnancy-  \par termination\par \par 2nd pregnancy- 2020\par early miscarriage: 5 weeks\par \par 3rd pregnancy- 2020\par Delivered at 34 weeks and 6 days: Baby girl\par by  on 2021.\par \par sPEC - BP Stable \par Encouraged Patient to monitor BP at home and keep a log and report results back to us for evaluation. Based on results, we will adjust medications as necessary. \par Additionally, encouraged heart healthy diet and exercise as tolerated.\par EKG with no acute changes.\par \par  naturally low in sodium, such as ricotta cheese, fresh mozzarella, or Swiss cheese Low-sodium or reduced-sodium cheese. Cream cheese. Yogurt.  Fats and oils  Unsalted butter. Unsalted margarine with no trans fat. Vegetable oils such as canola or olive oils.  Seasonings and other foods  Fresh and dried herbs and spices. Salt-free seasonings. Low-sodium mustard and ketchup. Sodium-free salad dressing. Sodium-free light mayonnaise. Fresh or refrigerated horseradish. Lemon juice. Vinegar. Homemade, reduced-sodium, or low-sodium soups. Unsalted popcorn and pretzels. Low-salt or salt-free chips.  What foods are not recommended?  The items listed may not be a complete list. Talk with your dietitian about what dietary choices are best for you.  Grains  Instant hot cereals. Bread stuffing, pancake, and biscuit mixes. Croutons. Seasoned rice or pasta mixes. Noodle soup cups. Boxed or frozen macaroni and cheese. Regular salted crackers. Self-rising flour.  Vegetables  Sauerkraut, pickled vegetables, and relishes. Olives. French fries. Onion rings. Regular canned vegetables (not low-sodium or reduced-sodium). Regular canned tomato sauce and paste (not low-sodium or reduced-sodium). Regular tomato and vegetable juice (not low-sodium or reduced-sodium). Frozen vegetables in sauces.  Meats and other protein foods  Meat or fish that is salted, canned, smoked, spiced, or pickled. Staley, ham, sausage, hotdogs, corned beef, chipped beef, packaged lunch meats, salt pork, jerky, pickled herring, anchovies, regular canned tuna, sardines, salted nuts.  Dairy  Processed cheese and cheese spreads. Cheese curds. Blue cheese. Feta cheese. String cheese. Regular cottage cheese. Buttermilk. Canned milk.  Fats and oils  Salted butter. Regular margarine. Ghee. Staley fat.  Seasonings and other foods  Onion salt, garlic salt, seasoned salt, table salt, and sea salt. Canned and packaged gravies. Worcestershire sauce. Tartar sauce. Barbecue sauce.  Teriyaki sauce. Soy sauce, including reduced-sodium. Steak sauce. Fish sauce. Oyster sauce. Cocktail sauce. Horseradish that you find on the shelf. Regular ketchup and mustard. Meat flavorings and tenderizers. Bouillon cubes. Hot sauce and Tabasco sauce. Premade or packaged marinades. Premade or packaged taco seasonings. Relishes. Regular salad dressings. Salsa. Potato and tortilla chips. Corn chips and puffs. Salted popcorn and pretzels. Canned or dried soups. Pizza. Frozen entrees and pot pies.  Summary  · Eating less sodium can help lower your blood pressure, reduce swelling, and protect your heart, liver, and kidneys.  · Most people on this plan should limit their sodium intake to 1,500-2,000 mg (milligrams) of sodium each day.  · Canned, boxed, and frozen foods are high in sodium. Restaurant foods, fast foods, and pizza are also very high in sodium. You also get sodium by adding salt to food.  · Try to cook at home, eat more fresh fruits and vegetables, and eat less fast food, canned, processed, or prepared foods.  This information is not intended to replace advice given to you by your health care provider. Make sure you discuss any questions you have with your health care provider.  Document Revised: 11/30/2018 Document Reviewed: 12/11/2017  [employer] Patient Education © 2020 [employer] Inc.

## 2021-09-25 NOTE — DISCUSSION/SUMMARY
[FreeTextEntry1] : 40 year old female originally from [facility] with history of hypothyroidism and  a recent emergency  at 34 weeks and 6 days for preeclampsia and hypertension. Treated with IV Magnesium X 24 hours. \par She carries a strong family history of both maternal and paternal hypertension.\par \par \par sPEC - BP Stable \par Encouraged Patient to monitor BP at home and keep a log and report results back to us for evaluation. Based on results, we will adjust medications as necessary. \par Additionally, encouraged heart healthy diet and exercise as tolerated.\par EKG with no acute changes.\par Was on Procardia prior but discontinued due to low BP\par Advised to maintain log and send in 1 week. \par Stress test to evaluate functional status \par \par #Hypothyroidism\par   Continue on Levothyroxine 50 mg daily\par \par #Adverse Cardiovascular Risk Factors\par   Will schedule one in-office visit for physical exam\par   EKG, exercise stress testing for cardiac evaluation of structure and function\par   in three months

## 2021-09-25 NOTE — CARDIOLOGY SUMMARY
[de-identified] : SR 80's  [de-identified] : minimal MR, NO aortic regurg, Normal :LV systolic function. normal diastolic function .

## 2021-11-02 ENCOUNTER — APPOINTMENT (OUTPATIENT)
Dept: CARDIOLOGY | Facility: CLINIC | Age: 41
End: 2021-11-02
Payer: COMMERCIAL

## 2021-11-02 PROCEDURE — 93015 CV STRESS TEST SUPVJ I&R: CPT

## 2021-11-04 ENCOUNTER — NON-APPOINTMENT (OUTPATIENT)
Age: 41
End: 2021-11-04

## 2021-11-04 DIAGNOSIS — E78.5 HYPERLIPIDEMIA, UNSPECIFIED: ICD-10-CM

## 2021-11-04 LAB
24R-OH-CALCIDIOL SERPL-MCNC: 83.5 PG/ML
ALBUMIN SERPL ELPH-MCNC: 4.6 G/DL
ALP BLD-CCNC: 114 U/L
ALT SERPL-CCNC: 26 U/L
ANION GAP SERPL CALC-SCNC: 14 MMOL/L
AST SERPL-CCNC: 23 U/L
BASOPHILS # BLD AUTO: 0.05 K/UL
BASOPHILS NFR BLD AUTO: 0.7 %
BILIRUB SERPL-MCNC: 0.2 MG/DL
BUN SERPL-MCNC: 15 MG/DL
CALCIUM SERPL-MCNC: 9.5 MG/DL
CHLORIDE SERPL-SCNC: 103 MMOL/L
CHOLEST SERPL-MCNC: 232 MG/DL
CO2 SERPL-SCNC: 24 MMOL/L
CREAT SERPL-MCNC: 0.63 MG/DL
EOSINOPHIL # BLD AUTO: 0.13 K/UL
EOSINOPHIL NFR BLD AUTO: 1.8 %
ESTIMATED AVERAGE GLUCOSE: 114 MG/DL
GLUCOSE SERPL-MCNC: 84 MG/DL
HBA1C MFR BLD HPLC: 5.6 %
HCT VFR BLD CALC: 42.1 %
HDLC SERPL-MCNC: 51 MG/DL
HGB BLD-MCNC: 13.6 G/DL
IMM GRANULOCYTES NFR BLD AUTO: 0.3 %
LDLC SERPL CALC-MCNC: 136 MG/DL
LYMPHOCYTES # BLD AUTO: 3.11 K/UL
LYMPHOCYTES NFR BLD AUTO: 44.2 %
MAN DIFF?: NORMAL
MCHC RBC-ENTMCNC: 28.8 PG
MCHC RBC-ENTMCNC: 32.3 GM/DL
MCV RBC AUTO: 89 FL
MONOCYTES # BLD AUTO: 0.42 K/UL
MONOCYTES NFR BLD AUTO: 6 %
NEUTROPHILS # BLD AUTO: 3.31 K/UL
NEUTROPHILS NFR BLD AUTO: 47 %
NONHDLC SERPL-MCNC: 181 MG/DL
PLATELET # BLD AUTO: 308 K/UL
POTASSIUM SERPL-SCNC: 4 MMOL/L
PROT SERPL-MCNC: 7.8 G/DL
RBC # BLD: 4.73 M/UL
RBC # FLD: 12.6 %
SODIUM SERPL-SCNC: 140 MMOL/L
TRIGL SERPL-MCNC: 227 MG/DL
TSH SERPL-ACNC: 5.55 UIU/ML
WBC # FLD AUTO: 7.04 K/UL

## 2021-11-04 PROCEDURE — 99212 OFFICE O/P EST SF 10 MIN: CPT

## 2021-11-09 ENCOUNTER — RX RENEWAL (OUTPATIENT)
Age: 41
End: 2021-11-09

## 2021-11-09 RX ORDER — PRENATAL WITH FERROUS FUM AND FOLIC ACID 3080; 920; 120; 400; 22; 1.84; 3; 20; 10; 1; 12; 200; 27; 25; 2 [IU]/1; [IU]/1; MG/1; [IU]/1; MG/1; MG/1; MG/1; MG/1; MG/1; MG/1; UG/1; MG/1; MG/1; MG/1; MG/1
27-1 TABLET ORAL
Qty: 90 | Refills: 2 | Status: ACTIVE | COMMUNITY
Start: 2021-11-09 | End: 1900-01-01

## 2021-11-19 ENCOUNTER — APPOINTMENT (OUTPATIENT)
Dept: ENDOCRINOLOGY | Facility: CLINIC | Age: 41
End: 2021-11-19
Payer: COMMERCIAL

## 2021-11-19 DIAGNOSIS — O99.280 ENDOCRINE, NUTRITIONAL AND METABOLIC DISEASES COMPLICATING PREGNANCY, UNSPECIFIED TRIMESTER: ICD-10-CM

## 2021-11-19 DIAGNOSIS — Z3A.32 32 WEEKS GESTATION OF PREGNANCY: ICD-10-CM

## 2021-11-19 DIAGNOSIS — Z3A.28 28 WEEKS GESTATION OF PREGNANCY: ICD-10-CM

## 2021-11-19 DIAGNOSIS — E03.9 ENDOCRINE, NUTRITIONAL AND METABOLIC DISEASES COMPLICATING PREGNANCY, UNSPECIFIED TRIMESTER: ICD-10-CM

## 2021-11-19 PROCEDURE — 99212 OFFICE O/P EST SF 10 MIN: CPT | Mod: 95

## 2021-11-22 PROBLEM — Z3A.28 28 WEEKS GESTATION OF PREGNANCY: Status: RESOLVED | Noted: 2021-05-05 | Resolved: 2021-11-22

## 2021-11-22 PROBLEM — O99.280 HYPOTHYROIDISM IN PREGNANCY: Status: RESOLVED | Noted: 2021-01-30 | Resolved: 2021-11-22

## 2021-11-22 PROBLEM — Z3A.32 32 WEEKS GESTATION OF PREGNANCY: Status: RESOLVED | Noted: 2021-06-04 | Resolved: 2021-11-22

## 2021-11-22 NOTE — ASSESSMENT
[FreeTextEntry1] : This is a 41 year old female with Hashimoto's thyroiditis, subclinical hypothyroidism, hyperprolactinemia, and pituitary microadenoma.\par 1. Hashimoto's thyroiditis\par TSH is 5.55.  Increase levothyroxine from 25 mcg daily to 25 mcg daily +1 extra tablet per week.\par Check TFTs in 6 weeks.\par 2. Hyperprolactinemia \par Normal on repeat x 3.  Will avoid checking prolactin level now as she is currently breast-feeding.\par 3. Pituitary microadenomas\par Check pituitary MRI after she completes breast-feeding.

## 2021-11-22 NOTE — HISTORY OF PRESENT ILLNESS
[FreeTextEntry1] : CC: Thyroid check\par \par This is a 41 year old female with Hashimoto's thyroiditis, hyperprolactinemia, and pituitary microadenoma. \par She was found to have hyperprolactinemia, low TSH, and pituitary microadenoma during a workup for infertility.\par She delivered via  on 2021 at 34 weeks +6 days due to preeclampsia and gestational diabetes.  She is currently on levothyroxine 25 mcg daily.\par She is exclusively breast-feeding.\par There is no family history of thyroid disease.\par \par  MRI pituitary showed 4.6mm area of hypoenhancement in the left lateral pituitary and 3mm hypoenhancement in the right posterior pituitary gland with infundibulium deviated rightward. There is no optic nerve or optic chiasm compression.\par

## 2022-04-26 ENCOUNTER — NON-APPOINTMENT (OUTPATIENT)
Age: 42
End: 2022-04-26

## 2022-04-26 ENCOUNTER — RX RENEWAL (OUTPATIENT)
Age: 42
End: 2022-04-26

## 2022-04-26 LAB
T4 FREE SERPL-MCNC: 1.3 NG/DL
TSH SERPL-ACNC: 3.7 UIU/ML

## 2022-07-05 ENCOUNTER — APPOINTMENT (OUTPATIENT)
Dept: ENDOCRINOLOGY | Facility: CLINIC | Age: 42
End: 2022-07-05

## 2022-07-05 DIAGNOSIS — E22.1 HYPERPROLACTINEMIA: ICD-10-CM

## 2022-07-05 PROCEDURE — 99212 OFFICE O/P EST SF 10 MIN: CPT | Mod: 95

## 2022-07-05 NOTE — ASSESSMENT
[FreeTextEntry1] : This is a 41 year old female with Hashimoto's thyroiditis, subclinical hypothyroidism, hyperprolactinemia, and pituitary microadenoma.\par 1. Hashimoto's thyroiditis\par Check TFTs.\par 2. Hyperprolactinemia \par Normal on repeat x 3.  Will avoid checking prolactin level now as she is currently breast-feeding.\par 3. Pituitary microadenomas\par Check pituitary MRI after she completes breastfeeding.

## 2022-07-05 NOTE — HISTORY OF PRESENT ILLNESS
[FreeTextEntry1] : CC: Thyroid check\par \par This is a 41 year old female with Hashimoto's thyroiditis, hyperprolactinemia, and pituitary microadenoma. \par She was found to have hyperprolactinemia, low TSH, and pituitary microadenoma during a workup for infertility.\par She delivered via  on 2021 at 34 weeks +6 days due to preeclampsia and gestational diabetes.  She is currently on levothyroxine 25 mcg daily + extra tablet on .\par She is pumping breastmilk 1 time per day.\par There is no family history of thyroid disease.\par LMP 2022\par \par  MRI pituitary from 2020 showed 4.6mm area of hypoenhancement in the left lateral pituitary and 3mm hypoenhancement in the right posterior pituitary gland with infundibulium deviated rightward. There is no optic nerve or optic chiasm compression.  Denies headaches or blurry vision.\par \par

## 2022-07-05 NOTE — REASON FOR VISIT
[Other Location: e.g. School (Enter Location, City,State)___] : at [unfilled], at the time of the visit. [Medical Office: ([facility])___] : at the medical office located in  [Patient] : the patient [Follow - Up] : a follow-up visit [Hypothyroidism] : hypothyroidism Normal Normal Normal

## 2022-07-07 ENCOUNTER — APPOINTMENT (OUTPATIENT)
Dept: OBGYN | Facility: CLINIC | Age: 42
End: 2022-07-07

## 2022-07-07 VITALS
HEIGHT: 60 IN | WEIGHT: 107 LBS | SYSTOLIC BLOOD PRESSURE: 130 MMHG | BODY MASS INDEX: 21.01 KG/M2 | DIASTOLIC BLOOD PRESSURE: 81 MMHG

## 2022-07-07 DIAGNOSIS — Z01.419 ENCOUNTER FOR GYNECOLOGICAL EXAMINATION (GENERAL) (ROUTINE) W/OUT ABNORMAL FINDINGS: ICD-10-CM

## 2022-07-07 DIAGNOSIS — N94.6 DYSMENORRHEA, UNSPECIFIED: ICD-10-CM

## 2022-07-07 DIAGNOSIS — Z31.69 ENCOUNTER FOR OTHER GENERAL COUNSELING AND ADVICE ON PROCREATION: ICD-10-CM

## 2022-07-07 PROCEDURE — 99395 PREV VISIT EST AGE 18-39: CPT

## 2022-07-07 PROCEDURE — 99213 OFFICE O/P EST LOW 20 MIN: CPT | Mod: 25

## 2022-07-07 RX ORDER — PRENATAL 56/IRON/FOLIC AC/DHA 35-5-1 MG
35-5-1-200 CAPSULE ORAL
Qty: 30 | Refills: 11 | Status: ACTIVE | COMMUNITY
Start: 2022-07-07 | End: 1900-01-01

## 2022-07-10 LAB — HPV HIGH+LOW RISK DNA PNL CVX: NOT DETECTED

## 2022-07-13 ENCOUNTER — NON-APPOINTMENT (OUTPATIENT)
Age: 42
End: 2022-07-13

## 2022-08-16 ENCOUNTER — NON-APPOINTMENT (OUTPATIENT)
Age: 42
End: 2022-08-16

## 2022-08-16 LAB
CORTIS SERPL-MCNC: 2.4 UG/DL
FSH SERPL-MCNC: 7.5 IU/L
HCG SERPL-MCNC: <1 MIU/ML
IGF-1 INTERP: NORMAL
IGF-I BLD-MCNC: 186 NG/ML
LH SERPL-ACNC: 15.8 IU/L
PROLACTIN SERPL-MCNC: 8.6 NG/ML
T4 FREE SERPL-MCNC: 1.2 NG/DL
TSH SERPL-ACNC: 3.12 UIU/ML

## 2022-08-17 ENCOUNTER — NON-APPOINTMENT (OUTPATIENT)
Age: 42
End: 2022-08-17

## 2022-08-19 LAB — ACTH-ESO: 13 PG/ML

## 2022-08-22 ENCOUNTER — RX RENEWAL (OUTPATIENT)
Age: 42
End: 2022-08-22

## 2022-08-22 RX ORDER — NAPROXEN 500 MG/1
500 TABLET ORAL
Qty: 30 | Refills: 2 | Status: ACTIVE | COMMUNITY
Start: 2022-07-07 | End: 1900-01-01

## 2022-10-03 ENCOUNTER — LABORATORY RESULT (OUTPATIENT)
Age: 42
End: 2022-10-03

## 2022-10-04 ENCOUNTER — APPOINTMENT (OUTPATIENT)
Dept: OBGYN | Facility: CLINIC | Age: 42
End: 2022-10-04

## 2022-10-04 VITALS — SYSTOLIC BLOOD PRESSURE: 138 MMHG | DIASTOLIC BLOOD PRESSURE: 80 MMHG

## 2022-10-04 DIAGNOSIS — R87.615 UNSATISFACTORY CYTOLOGIC SMEAR OF CERVIX: ICD-10-CM

## 2022-10-04 DIAGNOSIS — N92.6 IRREGULAR MENSTRUATION, UNSPECIFIED: ICD-10-CM

## 2022-10-04 LAB
ACTH SER-ACNC: 15.7 PG/ML
CORTIS SERPL-MCNC: 11.9 UG/DL

## 2022-10-04 PROCEDURE — 81025 URINE PREGNANCY TEST: CPT

## 2022-10-05 LAB
HCG UR QL: NEGATIVE
HPV HIGH+LOW RISK DNA PNL CVX: NOT DETECTED

## 2022-10-06 ENCOUNTER — NON-APPOINTMENT (OUTPATIENT)
Age: 42
End: 2022-10-06

## 2022-10-06 LAB — CYTOLOGY CVX/VAG DOC THIN PREP: NORMAL

## 2022-10-13 ENCOUNTER — NON-APPOINTMENT (OUTPATIENT)
Age: 42
End: 2022-10-13

## 2022-10-18 NOTE — OB RN INTRAOPERATIVE NOTE - NS_DURAMORPH_OBGYN_ALL_OB
Pt arrived in office for [facility], during check in requests message be sent to Dr Katie Aponte requesting lab orders be placed so he may complete. Yes

## 2022-11-09 ENCOUNTER — NON-APPOINTMENT (OUTPATIENT)
Age: 42
End: 2022-11-09

## 2023-01-23 ENCOUNTER — ASOB RESULT (OUTPATIENT)
Age: 43
End: 2023-01-23

## 2023-01-23 ENCOUNTER — APPOINTMENT (OUTPATIENT)
Dept: OBGYN | Facility: CLINIC | Age: 43
End: 2023-01-23
Payer: COMMERCIAL

## 2023-01-23 VITALS
WEIGHT: 115 LBS | DIASTOLIC BLOOD PRESSURE: 90 MMHG | SYSTOLIC BLOOD PRESSURE: 137 MMHG | BODY MASS INDEX: 22.58 KG/M2 | HEIGHT: 60 IN

## 2023-01-23 DIAGNOSIS — Z3A.10 10 WEEKS GESTATION OF PREGNANCY: ICD-10-CM

## 2023-01-23 DIAGNOSIS — R77.2 ABNORMALITY OF ALPHAFETOPROTEIN: ICD-10-CM

## 2023-01-23 DIAGNOSIS — O28.0 ABNORMAL HEMATOLOGICAL FINDING ON ANTENATAL SCREENING OF MOTHER: ICD-10-CM

## 2023-01-23 DIAGNOSIS — R79.89 OTHER SPECIFIED ABNORMAL FINDINGS OF BLOOD CHEMISTRY: ICD-10-CM

## 2023-01-23 PROCEDURE — 36415 COLL VENOUS BLD VENIPUNCTURE: CPT

## 2023-01-23 PROCEDURE — 0500F INITIAL PRENATAL CARE VISIT: CPT

## 2023-01-24 ENCOUNTER — NON-APPOINTMENT (OUTPATIENT)
Age: 43
End: 2023-01-24

## 2023-01-24 LAB
ABO + RH PNL BLD: NORMAL
ALBUMIN SERPL ELPH-MCNC: 4.4 G/DL
ALP BLD-CCNC: 61 U/L
ALT SERPL-CCNC: 22 U/L
ANION GAP SERPL CALC-SCNC: 15 MMOL/L
AST SERPL-CCNC: 20 U/L
BACTERIA UR CULT: NORMAL
BASOPHILS # BLD AUTO: 0.07 K/UL
BASOPHILS NFR BLD AUTO: 0.7 %
BILIRUB SERPL-MCNC: <0.2 MG/DL
BLD GP AB SCN SERPL QL: NORMAL
BUN SERPL-MCNC: 9 MG/DL
C TRACH RRNA SPEC QL NAA+PROBE: NOT DETECTED
CALCIUM SERPL-MCNC: 9.7 MG/DL
CHLORIDE SERPL-SCNC: 99 MMOL/L
CO2 SERPL-SCNC: 25 MMOL/L
CREAT SERPL-MCNC: 0.54 MG/DL
CREAT SPEC-SCNC: 21 MG/DL
CREAT/PROT UR: NORMAL RATIO
EGFR: 119 ML/MIN/1.73M2
EOSINOPHIL # BLD AUTO: 0.15 K/UL
EOSINOPHIL NFR BLD AUTO: 1.4 %
GLUCOSE SERPL-MCNC: 73 MG/DL
HBV SURFACE AG SER QL: NONREACTIVE
HCT VFR BLD CALC: 41.7 %
HCV AB SER QL: NONREACTIVE
HCV S/CO RATIO: 0.07 S/CO
HGB A MFR BLD: 97.4 %
HGB A2 MFR BLD: 2.6 %
HGB BLD-MCNC: 14.3 G/DL
HGB FRACT BLD-IMP: NORMAL
HIV1+2 AB SPEC QL IA.RAPID: NONREACTIVE
IMM GRANULOCYTES NFR BLD AUTO: 0.3 %
LEAD BLD-MCNC: <1 UG/DL
LYMPHOCYTES # BLD AUTO: 2.93 K/UL
LYMPHOCYTES NFR BLD AUTO: 27.8 %
MAN DIFF?: NORMAL
MCHC RBC-ENTMCNC: 31.4 PG
MCHC RBC-ENTMCNC: 34.3 GM/DL
MCV RBC AUTO: 91.4 FL
MEV IGG FLD QL IA: >300 AU/ML
MEV IGG+IGM SER-IMP: POSITIVE
MONOCYTES # BLD AUTO: 0.65 K/UL
MONOCYTES NFR BLD AUTO: 6.2 %
N GONORRHOEA RRNA SPEC QL NAA+PROBE: NOT DETECTED
NEUTROPHILS # BLD AUTO: 6.71 K/UL
NEUTROPHILS NFR BLD AUTO: 63.6 %
PLATELET # BLD AUTO: 325 K/UL
POTASSIUM SERPL-SCNC: 3.6 MMOL/L
PROT SERPL-MCNC: 8.1 G/DL
PROT UR-MCNC: <4 MG/DL
RBC # BLD: 4.56 M/UL
RBC # FLD: 12.5 %
RUBV IGG FLD-ACNC: 2.9 INDEX
RUBV IGG SER-IMP: POSITIVE
SODIUM SERPL-SCNC: 139 MMOL/L
SOURCE AMPLIFICATION: NORMAL
T PALLIDUM AB SER QL IA: NEGATIVE
T4 FREE SERPL-MCNC: 1.2 NG/DL
TSH SERPL-ACNC: 4.91 UIU/ML
VZV AB TITR SER: POSITIVE
VZV IGG SER IF-ACNC: 303.3 INDEX
WBC # FLD AUTO: 10.54 K/UL

## 2023-02-09 ENCOUNTER — APPOINTMENT (OUTPATIENT)
Dept: OBGYN | Facility: CLINIC | Age: 43
End: 2023-02-09
Payer: COMMERCIAL

## 2023-02-09 ENCOUNTER — ASOB RESULT (OUTPATIENT)
Age: 43
End: 2023-02-09

## 2023-02-09 VITALS — DIASTOLIC BLOOD PRESSURE: 72 MMHG | SYSTOLIC BLOOD PRESSURE: 120 MMHG | WEIGHT: 115 LBS | BODY MASS INDEX: 22.46 KG/M2

## 2023-02-09 DIAGNOSIS — Z3A.12 12 WEEKS GESTATION OF PREGNANCY: ICD-10-CM

## 2023-02-09 PROCEDURE — 0502F SUBSEQUENT PRENATAL CARE: CPT

## 2023-02-09 PROCEDURE — 76813 OB US NUCHAL MEAS 1 GEST: CPT

## 2023-02-15 NOTE — REASON FOR VISIT
[Follow - Up] : a follow-up visit [Hypothyroidism] : hypothyroidism [Other Location: e.g. School (Enter Location, City,State)___] : at [unfilled], at the time of the visit. [Medical Office: ([facility])___] : at the medical office located in  [Patient] : the patient

## 2023-02-16 ENCOUNTER — APPOINTMENT (OUTPATIENT)
Dept: ENDOCRINOLOGY | Facility: CLINIC | Age: 43
End: 2023-02-16
Payer: COMMERCIAL

## 2023-02-16 LAB
T4 FREE SERPL-MCNC: 1.2 NG/DL
TSH SERPL-ACNC: 2.42 UIU/ML

## 2023-02-16 PROCEDURE — 99213 OFFICE O/P EST LOW 20 MIN: CPT | Mod: 95

## 2023-02-16 RX ORDER — LEVOTHYROXINE SODIUM 0.03 MG/1
25 TABLET ORAL
Qty: 90 | Refills: 2 | Status: COMPLETED | COMMUNITY
Start: 2020-07-17 | End: 2023-02-16

## 2023-02-19 NOTE — HISTORY OF PRESENT ILLNESS
[FreeTextEntry1] : CC: Thyroid check\par \par This is a 42 year old female with Hashimoto's thyroiditis, hyperprolactinemia, and pituitary microadenoma. \par She was found to have hyperprolactinemia, low TSH, and pituitary microadenoma during a workup for infertility.\par She delivered via  on 2021 at 34 weeks +6 days due to preeclampsia and gestational diabetes.\par   Reports she is currently 13 weeks pregnant. Reports TSH from 2022 was approximately 1. Blood work form 2023 showed TSH 4.91. She was advised by gynecologist to start levothyroxine 50 mcg daily which she started 3 weeks ago. She was previously on levothyroxine 25 mcg daily. Recent TSH 2.42. \par There is no family history of thyroid disease.\par LMP 2022\par MONTSERRAT 2023\par \par MRI pituitary from 2020 showed 4.6mm area of hypoenhancement in the left lateral pituitary and 3mm hypoenhancement in the right posterior pituitary gland with infundibulium deviated rightward. There is no optic nerve or optic chiasm compression.  Denies headaches or blurry vision.\par \par

## 2023-02-19 NOTE — ASSESSMENT
[FreeTextEntry1] : This is a 42 year old female with Hashimoto's thyroiditis, subclinical hypothyroidism, hyperprolactinemia, and pituitary microadenoma, currently 13 weeks pregnant. \par 1. Hashimoto's thyroiditis\par Unfortunately TSH was not at goal for first trimester pregnancy. She is now on levothyroxine 50 mcg daily. This dose was change approximately 3 weeks ago. Check TFTs next week. \par 2. Hyperprolactinemia \par Normal on repeat x 3.  Will avoid checking prolactin level now as she is pregnant.\par 3. Pituitary microadenomas\par Check pituitary MRI after delivery.

## 2023-02-19 NOTE — ADDENDUM
[FreeTextEntry1] : By signing my name below, I, Kym Hanks, attest that this document has been prepared under the direction and in the presence of Dr. Bocanegra.\par \par I, Rita Bocanegra MD, personally performed the services described in this documentation. All medical record entries made by the scribe were at my discretion and in my presence. I have reviewed the chart and discharge instructions (if applicable) and agree that the record reflects my personal permanence and is accurate and complete.\par

## 2023-03-06 ENCOUNTER — NON-APPOINTMENT (OUTPATIENT)
Age: 43
End: 2023-03-06

## 2023-03-06 ENCOUNTER — ASOB RESULT (OUTPATIENT)
Age: 43
End: 2023-03-06

## 2023-03-06 ENCOUNTER — APPOINTMENT (OUTPATIENT)
Dept: OBGYN | Facility: CLINIC | Age: 43
End: 2023-03-06
Payer: COMMERCIAL

## 2023-03-06 ENCOUNTER — APPOINTMENT (OUTPATIENT)
Dept: CARDIOLOGY | Facility: CLINIC | Age: 43
End: 2023-03-06
Payer: COMMERCIAL

## 2023-03-06 VITALS
WEIGHT: 119 LBS | OXYGEN SATURATION: 99 % | DIASTOLIC BLOOD PRESSURE: 75 MMHG | SYSTOLIC BLOOD PRESSURE: 127 MMHG | HEART RATE: 85 BPM | BODY MASS INDEX: 23.24 KG/M2

## 2023-03-06 VITALS — SYSTOLIC BLOOD PRESSURE: 132 MMHG | DIASTOLIC BLOOD PRESSURE: 71 MMHG | BODY MASS INDEX: 23.24 KG/M2 | WEIGHT: 119 LBS

## 2023-03-06 PROCEDURE — 93000 ELECTROCARDIOGRAM COMPLETE: CPT

## 2023-03-06 PROCEDURE — 0502F SUBSEQUENT PRENATAL CARE: CPT

## 2023-03-06 PROCEDURE — 99214 OFFICE O/P EST MOD 30 MIN: CPT | Mod: 25

## 2023-03-06 PROCEDURE — 36415 COLL VENOUS BLD VENIPUNCTURE: CPT

## 2023-03-07 LAB — GLUCOSE 1H P 50 G GLC PO SERPL-MCNC: 128 MG/DL

## 2023-03-07 NOTE — HISTORY OF PRESENT ILLNESS
[FreeTextEntry1] : 42 year old female, originally from [facility] with history of hypothyroidism and  a recent emergency  at 34 weeks and 6 days for preeclampsia and hypertension. Treated with IV Magnesium X 24 hours. \par \par She carries a strong family history of both maternal and paternal hypertension.\par \par Patient presents via telehealth to [facility] for blood pressure management and cardiovascular screening and risk assessment. . \par \par Her pregnancy history is as follows:\par \par 1st pregnancy-  \par termination\par \par 2nd pregnancy- 2020\par early miscarriage: 5 weeks\par \par 3rd pregnancy- 2020\par Delivered at 34 weeks and 6 days: Baby girl\par by  on 2021.\par \par interval note\par 3/6/23\par \par currenlty pregnant at 16 weeks\par started Asa at 12 weeks\par BP well controlled at home 120s/70s on no medical therapy\par asymptomatic - denies chest pain, SOB/ANDRADE, palpitations, lightheadedness or syncope\par \par

## 2023-03-07 NOTE — DISCUSSION/SUMMARY
[EKG obtained to assist in diagnosis and management of assessed problem(s)] : EKG obtained to assist in diagnosis and management of assessed problem(s) [FreeTextEntry1] : 42 year old female, originally from [facility] with history of hypothyroidism and  a recent emergency  at 34 weeks and 6 days for preeclampsia and hypertension. Treated with IV Magnesium X 24 hours. \par \par She carries a strong family history of both maternal and paternal hypertension.\par \par Her pregnancy history is as follows:\par \par 1st pregnancy-  \par termination\par \par 2nd pregnancy- 2020\par early miscarriage: 5 weeks\par \par 3rd pregnancy- 2020\par Delivered at 34 weeks and 6 days: Baby girl\par by  on 2021.\par \par interval note\par 3/6/23\par \par currenlty pregnant at 16 weeks\par started Asa at 12 weeks\par BP well controlled at home 120s/70s on no medical therapy\par asymptomatic - denies chest pain, SOB/ANDRADE, palpitations, lightheadedness or syncope\par \par #Gestational Hypertension\par   Blood pressure is currently in good control on no medical agents\par   Continue with asa\par   Advised patient to hold contact us if she sees BP readings >140/90\par \par #Hypothyroidism\par   Continue on Levothyroxine 50 mg daily\par \par #Adverse Cardiovascular Risk Factors\par   ECG with no acute ischemic changes (stable from prior)\par will schedule for an echocardiogram to reassess heart function and rule out diastolic issues\par \par Encouraged patient to continue healthy exercise and eating habits, focusing on a Mediterranean style of eating and aiming for the recommended 150 minutes per week of moderate physical activity.\par \par Encouraged the patient to find healthy outlets and coping mechanisms to help manage stress, such as physical activity/exercise, reducing workload if possible, spending time with family and friends, engaging in an enjoyable hobby, or using meditation or mindfulness techniques.\par \par \par

## 2023-03-09 ENCOUNTER — NON-APPOINTMENT (OUTPATIENT)
Age: 43
End: 2023-03-09

## 2023-03-09 LAB
T3 SERPL-MCNC: 131 NG/DL
T4 FREE SERPL-MCNC: 1.2 NG/DL
TSH SERPL-ACNC: 1.98 UIU/ML

## 2023-03-10 LAB
AFP MOM: 0.91
AFP VALUE: 38.8 NG/ML
ALPHA FETOPROTEIN SERUM COMMENT: NORMAL
ALPHA FETOPROTEIN SERUM INTERPRETATION: NORMAL
ALPHA FETOPROTEIN SERUM RESULTS: NORMAL
ALPHA FETOPROTEIN SERUM TEST RESULTS: NORMAL
GESTATIONAL AGE BASED ON: NORMAL
GESTATIONAL AGE ON COLLECTION DATE: 16.4 WEEKS
INSULIN DEP DIABETES: NO
MATERNAL AGE AT EDD AFP: 41 YR
MULTIPLE GESTATION: NO
OSBR RISK 1 IN: NORMAL
RACE: NORMAL
WEIGHT AFP: 119 LBS

## 2023-03-21 ENCOUNTER — APPOINTMENT (OUTPATIENT)
Dept: ENDOCRINOLOGY | Facility: CLINIC | Age: 43
End: 2023-03-21

## 2023-03-22 ENCOUNTER — APPOINTMENT (OUTPATIENT)
Dept: ENDOCRINOLOGY | Facility: CLINIC | Age: 43
End: 2023-03-22
Payer: COMMERCIAL

## 2023-03-22 PROCEDURE — 99212 OFFICE O/P EST SF 10 MIN: CPT | Mod: 95

## 2023-03-22 RX ORDER — NORETHINDRONE ACETATE 5 MG/1
5 TABLET ORAL
Qty: 14 | Refills: 3 | Status: COMPLETED | COMMUNITY
Start: 2022-10-04 | End: 2023-03-22

## 2023-03-25 ENCOUNTER — APPOINTMENT (OUTPATIENT)
Dept: CARDIOLOGY | Facility: CLINIC | Age: 43
End: 2023-03-25
Payer: COMMERCIAL

## 2023-03-25 DIAGNOSIS — R06.09 OTHER FORMS OF DYSPNEA: ICD-10-CM

## 2023-03-25 PROCEDURE — 93306 TTE W/DOPPLER COMPLETE: CPT

## 2023-03-26 NOTE — ASSESSMENT
[FreeTextEntry1] : This is a 42 year old female with Hashimoto's thyroiditis, subclinical hypothyroidism, hyperprolactinemia, and pituitary microadenoma, currently 18 weeks pregnant. \par 1. Hashimoto's thyroiditis\par Unfortunately TSH was not at goal for first trimester pregnancy. She is now on levothyroxine 50 mcg daily.Continue levothyroxine 50 mcg daily as recent TSH is 1.98. Check TFTs in 4 weeks.\par 2. Hyperprolactinemia \par Normal on repeat x 3.  Will avoid checking prolactin level now as she is pregnant.\par 3. Pituitary microadenomas\par Check pituitary MRI after delivery.

## 2023-03-26 NOTE — HISTORY OF PRESENT ILLNESS
[FreeTextEntry1] : CC: Thyroid check\par \par This is a 42 year old female with Hashimoto's thyroiditis, hyperprolactinemia, and pituitary microadenoma. \par She was found to have hyperprolactinemia, low TSH, and pituitary microadenoma during a workup for infertility.\par She delivered via  on 2021 at 34 weeks +6 days due to preeclampsia and gestational diabetes.\par  Reports she is currently 18 weeks pregnant. Reports TSH from 2022 was approximately 1. Blood work form 2023 showed TSH 4.91. She was advised by gynecologist to start levothyroxine 50 mcg daily. She was previously on levothyroxine 25 mcg daily. TSH from 2023 was 1.98\par There is no family history of thyroid disease.\par LMP 2022\par MONTSERRAT 2023\par \par MRI pituitary from 2020 showed 4.6mm area of hypoenhancement in the left lateral pituitary and 3mm hypoenhancement in the right posterior pituitary gland with infundibulium deviated rightward. There is no optic nerve or optic chiasm compression.  Denies headaches or blurry vision.\par \par

## 2023-04-03 ENCOUNTER — ASOB RESULT (OUTPATIENT)
Age: 43
End: 2023-04-03

## 2023-04-03 ENCOUNTER — APPOINTMENT (OUTPATIENT)
Dept: OBGYN | Facility: CLINIC | Age: 43
End: 2023-04-03
Payer: COMMERCIAL

## 2023-04-03 VITALS — SYSTOLIC BLOOD PRESSURE: 148 MMHG | DIASTOLIC BLOOD PRESSURE: 84 MMHG | BODY MASS INDEX: 24.02 KG/M2 | WEIGHT: 123 LBS

## 2023-04-03 DIAGNOSIS — Z3A.20 20 WEEKS GESTATION OF PREGNANCY: ICD-10-CM

## 2023-04-03 PROCEDURE — 76805 OB US >/= 14 WKS SNGL FETUS: CPT

## 2023-04-03 PROCEDURE — 0502F SUBSEQUENT PRENATAL CARE: CPT

## 2023-04-03 RX ORDER — MOMETASONE FUROATE 1 MG/G
0.1 CREAM TOPICAL
Qty: 45 | Refills: 0 | Status: ACTIVE | COMMUNITY
Start: 2022-12-05

## 2023-04-03 RX ORDER — CLINDAMYCIN PHOSPHATE 10 MG/ML
1 SOLUTION TOPICAL
Qty: 60 | Refills: 0 | Status: ACTIVE | COMMUNITY
Start: 2022-11-03

## 2023-04-03 RX ORDER — MOMETASONE FUROATE 1 MG/G
0.1 OINTMENT TOPICAL
Qty: 60 | Refills: 0 | Status: ACTIVE | COMMUNITY
Start: 2022-11-03

## 2023-04-09 LAB
T3 SERPL-MCNC: 143 NG/DL
T4 FREE SERPL-MCNC: 1.1 NG/DL
TSH SERPL-ACNC: 2.03 UIU/ML

## 2023-05-04 ENCOUNTER — NON-APPOINTMENT (OUTPATIENT)
Age: 43
End: 2023-05-04

## 2023-05-05 ENCOUNTER — ASOB RESULT (OUTPATIENT)
Age: 43
End: 2023-05-05

## 2023-05-05 ENCOUNTER — LABORATORY RESULT (OUTPATIENT)
Age: 43
End: 2023-05-05

## 2023-05-05 ENCOUNTER — APPOINTMENT (OUTPATIENT)
Dept: OBGYN | Facility: CLINIC | Age: 43
End: 2023-05-05
Payer: COMMERCIAL

## 2023-05-05 VITALS
DIASTOLIC BLOOD PRESSURE: 80 MMHG | BODY MASS INDEX: 24.74 KG/M2 | HEIGHT: 60 IN | SYSTOLIC BLOOD PRESSURE: 127 MMHG | WEIGHT: 126 LBS

## 2023-05-05 DIAGNOSIS — Z3A.24 24 WEEKS GESTATION OF PREGNANCY: ICD-10-CM

## 2023-05-05 DIAGNOSIS — O09.299 SUPERVISION OF PREGNANCY WITH OTHER POOR REPRODUCTIVE OR OBSTETRIC HISTORY, UNSPECIFIED TRIMESTER: ICD-10-CM

## 2023-05-05 PROCEDURE — 76816 OB US FOLLOW-UP PER FETUS: CPT

## 2023-05-05 PROCEDURE — 0502F SUBSEQUENT PRENATAL CARE: CPT

## 2023-05-14 PROBLEM — R06.09 DYSPNEA ON EXERTION: Status: ACTIVE | Noted: 2023-03-06

## 2023-05-18 ENCOUNTER — APPOINTMENT (OUTPATIENT)
Dept: ENDOCRINOLOGY | Facility: CLINIC | Age: 43
End: 2023-05-18

## 2023-05-30 ENCOUNTER — ASOB RESULT (OUTPATIENT)
Age: 43
End: 2023-05-30

## 2023-05-30 ENCOUNTER — APPOINTMENT (OUTPATIENT)
Dept: OBGYN | Facility: CLINIC | Age: 43
End: 2023-05-30
Payer: COMMERCIAL

## 2023-05-30 VITALS — SYSTOLIC BLOOD PRESSURE: 139 MMHG | WEIGHT: 130.4 LBS | BODY MASS INDEX: 25.47 KG/M2 | DIASTOLIC BLOOD PRESSURE: 77 MMHG

## 2023-05-30 DIAGNOSIS — Z23 ENCOUNTER FOR IMMUNIZATION: ICD-10-CM

## 2023-05-30 DIAGNOSIS — Z3A.28 28 WEEKS GESTATION OF PREGNANCY: ICD-10-CM

## 2023-05-30 PROCEDURE — 0502F SUBSEQUENT PRENATAL CARE: CPT

## 2023-05-30 PROCEDURE — 76816 OB US FOLLOW-UP PER FETUS: CPT

## 2023-05-31 ENCOUNTER — NON-APPOINTMENT (OUTPATIENT)
Age: 43
End: 2023-05-31

## 2023-05-31 DIAGNOSIS — R73.09 OTHER ABNORMAL GLUCOSE: ICD-10-CM

## 2023-05-31 DIAGNOSIS — K64.9 UNSPECIFIED HEMORRHOIDS: ICD-10-CM

## 2023-05-31 LAB
BLD GP AB SCN SERPL QL: NORMAL
GLUCOSE 1H P 50 G GLC PO SERPL-MCNC: 152 MG/DL
HIV1+2 AB SPEC QL IA.RAPID: NONREACTIVE
T4 FREE SERPL-MCNC: 1 NG/DL
TSH SERPL-ACNC: 1.65 UIU/ML

## 2023-05-31 RX ORDER — HYDROCORTISONE 25 MG/G
2.5 CREAM TOPICAL DAILY
Qty: 1 | Refills: 1 | Status: ACTIVE | COMMUNITY
Start: 2023-05-31 | End: 1900-01-01

## 2023-06-08 ENCOUNTER — APPOINTMENT (OUTPATIENT)
Dept: ENDOCRINOLOGY | Facility: CLINIC | Age: 43
End: 2023-06-08

## 2023-06-21 ENCOUNTER — APPOINTMENT (OUTPATIENT)
Dept: CARDIOLOGY | Facility: CLINIC | Age: 43
End: 2023-06-21
Payer: COMMERCIAL

## 2023-06-21 VITALS — BODY MASS INDEX: 25.39 KG/M2 | WEIGHT: 130 LBS

## 2023-06-21 VITALS — DIASTOLIC BLOOD PRESSURE: 86 MMHG | HEART RATE: 112 BPM | SYSTOLIC BLOOD PRESSURE: 149 MMHG | OXYGEN SATURATION: 99 %

## 2023-06-21 PROCEDURE — 93000 ELECTROCARDIOGRAM COMPLETE: CPT

## 2023-06-21 PROCEDURE — 99214 OFFICE O/P EST MOD 30 MIN: CPT | Mod: 25

## 2023-06-21 NOTE — DISCUSSION/SUMMARY
[FreeTextEntry1] : 42 year old female, originally from [facility] with history of hypothyroidism and  a recent emergency  at 34 weeks and 6 days for preeclampsia and hypertension. Treated with IV Magnesium X 24 hours. \par \par She carries a strong family history of both maternal and paternal hypertension.\par \par Patient presents via telehealth to the [facility] for blood pressure management and cardiovascular screening and risk assessment. . \par \par Her pregnancy history is as follows:\par \par 1st pregnancy-  \par termination\par \par 2nd pregnancy- 2020\par early miscarriage: 5 weeks\par \par 3rd pregnancy- 2020\par Delivered at 34 weeks and 6 days: Baby girl\par by  on 2021.\par \par interval note\par 23\par \par currenlty pregnant at 30 weeks\par started Asa at 12 weeks\par BP well controlled at home 120s/70s on no medical therapy\par asymptomatic - denies chest pain, SOB/ANDRADE, palpitations, lightheadedness or syncope\par patient stays minimally active - rare walks\par is still working\par \par #Gestational Hypertension\par   Blood pressure is currently in good control on no medical agents\par   Continue with asa\par   Advised patient to hold contact us if she sees BP readings >140/90\par \par #Hypothyroidism\par   Continue on Levothyroxine 50 mg daily\par \par #Adverse Cardiovascular Risk Factors\par   ECG with no acute ischemic changes (stable from prior)\par will schedule for an echocardiogram to reassess heart function and rule out diastolic issues\par \par Encouraged patient to continue healthy exercise and eating habits, focusing on a Mediterranean style of eating and aiming for the recommended 150 minutes per week of moderate physical activity.\par \par Encouraged the patient to find healthy outlets and coping mechanisms to help manage stress, such as physical activity/exercise, reducing workload if possible, spending time with family and friends, engaging in an enjoyable hobby, or using meditation or mindfulness techniques.\par \par \par  [EKG obtained to assist in diagnosis and management of assessed problem(s)] : EKG obtained to assist in diagnosis and management of assessed problem(s)

## 2023-06-21 NOTE — HISTORY OF PRESENT ILLNESS
[FreeTextEntry1] : 42 year old female, originally from [facility] with history of hypothyroidism and  a recent emergency  at 34 weeks and 6 days for preeclampsia and hypertension. Treated with IV Magnesium X 24 hours. \par \par She carries a strong family history of both maternal and paternal hypertension.\par \par Patient presents via telehealth to the [facility] for blood pressure management and cardiovascular screening and risk assessment. . \par \par Her pregnancy history is as follows:\par \par 1st pregnancy-  \par termination\par \par 2nd pregnancy- 2020\par early miscarriage: 5 weeks\par \par 3rd pregnancy- 2020\par Delivered at 34 weeks and 6 days: Baby girl\par by  on 2021.\par \par interval note\par 23\par \par currenlty pregnant at 30 weeks\par started Asa at 12 weeks\par BP well controlled at home 120s/70s on no medical therapy\par asymptomatic - denies chest pain, SOB/ANDRADE, palpitations, lightheadedness or syncope\par patient stays minimally active - rare walks\par is still working\par \par

## 2023-06-25 ENCOUNTER — NON-APPOINTMENT (OUTPATIENT)
Age: 43
End: 2023-06-25

## 2023-06-26 ENCOUNTER — ASOB RESULT (OUTPATIENT)
Age: 43
End: 2023-06-26

## 2023-06-26 ENCOUNTER — APPOINTMENT (OUTPATIENT)
Dept: ANTEPARTUM | Facility: CLINIC | Age: 43
End: 2023-06-26
Payer: COMMERCIAL

## 2023-06-26 ENCOUNTER — APPOINTMENT (OUTPATIENT)
Dept: MATERNAL FETAL MEDICINE | Facility: CLINIC | Age: 43
End: 2023-06-26
Payer: COMMERCIAL

## 2023-06-26 ENCOUNTER — APPOINTMENT (OUTPATIENT)
Dept: OBGYN | Facility: CLINIC | Age: 43
End: 2023-06-26
Payer: COMMERCIAL

## 2023-06-26 ENCOUNTER — OUTPATIENT (OUTPATIENT)
Dept: OUTPATIENT SERVICES | Facility: HOSPITAL | Age: 43
LOS: 1 days | End: 2023-06-26
Payer: COMMERCIAL

## 2023-06-26 ENCOUNTER — NON-APPOINTMENT (OUTPATIENT)
Age: 43
End: 2023-06-26

## 2023-06-26 VITALS
TEMPERATURE: 98 F | DIASTOLIC BLOOD PRESSURE: 86 MMHG | SYSTOLIC BLOOD PRESSURE: 139 MMHG | OXYGEN SATURATION: 99 % | HEART RATE: 113 BPM | RESPIRATION RATE: 17 BRPM

## 2023-06-26 VITALS
DIASTOLIC BLOOD PRESSURE: 80 MMHG | BODY MASS INDEX: 26.11 KG/M2 | HEIGHT: 60 IN | WEIGHT: 133 LBS | SYSTOLIC BLOOD PRESSURE: 150 MMHG

## 2023-06-26 VITALS — TEMPERATURE: 98 F

## 2023-06-26 VITALS — SYSTOLIC BLOOD PRESSURE: 150 MMHG | DIASTOLIC BLOOD PRESSURE: 70 MMHG

## 2023-06-26 DIAGNOSIS — O09.519 SUPERVISION OF ELDERLY PRIMIGRAVIDA, UNSPECIFIED TRIMESTER: ICD-10-CM

## 2023-06-26 DIAGNOSIS — O26.899 OTHER SPECIFIED PREGNANCY RELATED CONDITIONS, UNSPECIFIED TRIMESTER: ICD-10-CM

## 2023-06-26 DIAGNOSIS — O09.893 SUPERVISION OF OTHER HIGH RISK PREGNANCIES, THIRD TRIMESTER: ICD-10-CM

## 2023-06-26 DIAGNOSIS — K08.409 PARTIAL LOSS OF TEETH, UNSPECIFIED CAUSE, UNSPECIFIED CLASS: Chronic | ICD-10-CM

## 2023-06-26 DIAGNOSIS — Z98.890 OTHER SPECIFIED POSTPROCEDURAL STATES: Chronic | ICD-10-CM

## 2023-06-26 LAB
ALBUMIN SERPL ELPH-MCNC: 3.5 G/DL — SIGNIFICANT CHANGE UP (ref 3.3–5)
ALP SERPL-CCNC: 109 U/L — SIGNIFICANT CHANGE UP (ref 40–120)
ALT FLD-CCNC: 19 U/L — SIGNIFICANT CHANGE UP (ref 10–45)
ANION GAP SERPL CALC-SCNC: 12 MMOL/L — SIGNIFICANT CHANGE UP (ref 5–17)
APPEARANCE UR: CLEAR — SIGNIFICANT CHANGE UP
APTT BLD: 26.7 SEC — LOW (ref 27.5–35.5)
AST SERPL-CCNC: 18 U/L — SIGNIFICANT CHANGE UP (ref 10–40)
BASOPHILS # BLD AUTO: 0.04 K/UL — SIGNIFICANT CHANGE UP (ref 0–0.2)
BASOPHILS NFR BLD AUTO: 0.3 % — SIGNIFICANT CHANGE UP (ref 0–2)
BILIRUB SERPL-MCNC: 0.1 MG/DL — LOW (ref 0.2–1.2)
BILIRUB UR-MCNC: NEGATIVE — SIGNIFICANT CHANGE UP
BUN SERPL-MCNC: 8 MG/DL — SIGNIFICANT CHANGE UP (ref 7–23)
CALCIUM SERPL-MCNC: 8.4 MG/DL — SIGNIFICANT CHANGE UP (ref 8.4–10.5)
CHLORIDE SERPL-SCNC: 105 MMOL/L — SIGNIFICANT CHANGE UP (ref 96–108)
CO2 SERPL-SCNC: 19 MMOL/L — LOW (ref 22–31)
COLOR SPEC: SIGNIFICANT CHANGE UP
CREAT ?TM UR-MCNC: 104 MG/DL — SIGNIFICANT CHANGE UP
CREAT SERPL-MCNC: 0.58 MG/DL — SIGNIFICANT CHANGE UP (ref 0.5–1.3)
DIFF PNL FLD: NEGATIVE — SIGNIFICANT CHANGE UP
EGFR: 117 ML/MIN/1.73M2 — SIGNIFICANT CHANGE UP
EOSINOPHIL # BLD AUTO: 0.13 K/UL — SIGNIFICANT CHANGE UP (ref 0–0.5)
EOSINOPHIL NFR BLD AUTO: 1.1 % — SIGNIFICANT CHANGE UP (ref 0–6)
GLUCOSE SERPL-MCNC: 129 MG/DL — HIGH (ref 70–99)
GLUCOSE UR QL: NEGATIVE — SIGNIFICANT CHANGE UP
HCT VFR BLD CALC: 34.1 % — LOW (ref 34.5–45)
HGB BLD-MCNC: 11.5 G/DL — SIGNIFICANT CHANGE UP (ref 11.5–15.5)
IMM GRANULOCYTES NFR BLD AUTO: 1.3 % — HIGH (ref 0–0.9)
INR BLD: 0.93 RATIO — SIGNIFICANT CHANGE UP (ref 0.88–1.16)
KETONES UR-MCNC: NEGATIVE — SIGNIFICANT CHANGE UP
LDH SERPL L TO P-CCNC: 205 U/L — SIGNIFICANT CHANGE UP (ref 50–242)
LEUKOCYTE ESTERASE UR-ACNC: NEGATIVE — SIGNIFICANT CHANGE UP
LYMPHOCYTES # BLD AUTO: 2.47 K/UL — SIGNIFICANT CHANGE UP (ref 1–3.3)
LYMPHOCYTES # BLD AUTO: 21.1 % — SIGNIFICANT CHANGE UP (ref 13–44)
MCHC RBC-ENTMCNC: 32.1 PG — SIGNIFICANT CHANGE UP (ref 27–34)
MCHC RBC-ENTMCNC: 33.7 GM/DL — SIGNIFICANT CHANGE UP (ref 32–36)
MCV RBC AUTO: 95.3 FL — SIGNIFICANT CHANGE UP (ref 80–100)
MONOCYTES # BLD AUTO: 1.02 K/UL — HIGH (ref 0–0.9)
MONOCYTES NFR BLD AUTO: 8.7 % — SIGNIFICANT CHANGE UP (ref 2–14)
NEUTROPHILS # BLD AUTO: 7.87 K/UL — HIGH (ref 1.8–7.4)
NEUTROPHILS NFR BLD AUTO: 67.5 % — SIGNIFICANT CHANGE UP (ref 43–77)
NITRITE UR-MCNC: NEGATIVE — SIGNIFICANT CHANGE UP
NRBC # BLD: 0 /100 WBCS — SIGNIFICANT CHANGE UP (ref 0–0)
PH UR: 6.5 — SIGNIFICANT CHANGE UP (ref 5–8)
PLATELET # BLD AUTO: 264 K/UL — SIGNIFICANT CHANGE UP (ref 150–400)
POTASSIUM SERPL-MCNC: 3.5 MMOL/L — SIGNIFICANT CHANGE UP (ref 3.5–5.3)
POTASSIUM SERPL-SCNC: 3.5 MMOL/L — SIGNIFICANT CHANGE UP (ref 3.5–5.3)
PROT ?TM UR-MCNC: 8 MG/DL — SIGNIFICANT CHANGE UP (ref 0–12)
PROT SERPL-MCNC: 6.6 G/DL — SIGNIFICANT CHANGE UP (ref 6–8.3)
PROT UR-MCNC: ABNORMAL
PROT/CREAT UR-RTO: 0.1 RATIO — SIGNIFICANT CHANGE UP (ref 0–0.2)
PROTHROM AB SERPL-ACNC: 10.7 SEC — SIGNIFICANT CHANGE UP (ref 10.5–13.4)
RBC # BLD: 3.58 M/UL — LOW (ref 3.8–5.2)
RBC # FLD: 12.3 % — SIGNIFICANT CHANGE UP (ref 10.3–14.5)
SODIUM SERPL-SCNC: 136 MMOL/L — SIGNIFICANT CHANGE UP (ref 135–145)
SP GR SPEC: 1.02 — SIGNIFICANT CHANGE UP (ref 1.01–1.02)
URATE SERPL-MCNC: 3.6 MG/DL — SIGNIFICANT CHANGE UP (ref 2.5–7)
UROBILINOGEN FLD QL: NEGATIVE — SIGNIFICANT CHANGE UP
WBC # BLD: 11.68 K/UL — HIGH (ref 3.8–10.5)
WBC # FLD AUTO: 11.68 K/UL — HIGH (ref 3.8–10.5)

## 2023-06-26 PROCEDURE — 76816 OB US FOLLOW-UP PER FETUS: CPT | Mod: 77

## 2023-06-26 PROCEDURE — G0108 DIAB MANAGE TRN  PER INDIV: CPT | Mod: 95

## 2023-06-26 PROCEDURE — 82570 ASSAY OF URINE CREATININE: CPT

## 2023-06-26 PROCEDURE — 0502F SUBSEQUENT PRENATAL CARE: CPT

## 2023-06-26 PROCEDURE — 85730 THROMBOPLASTIN TIME PARTIAL: CPT

## 2023-06-26 PROCEDURE — 84156 ASSAY OF PROTEIN URINE: CPT

## 2023-06-26 PROCEDURE — 76819 FETAL BIOPHYS PROFIL W/O NST: CPT

## 2023-06-26 PROCEDURE — 81001 URINALYSIS AUTO W/SCOPE: CPT

## 2023-06-26 PROCEDURE — 83615 LACTATE (LD) (LDH) ENZYME: CPT

## 2023-06-26 PROCEDURE — 76816 OB US FOLLOW-UP PER FETUS: CPT

## 2023-06-26 PROCEDURE — 80053 COMPREHEN METABOLIC PANEL: CPT

## 2023-06-26 PROCEDURE — 85025 COMPLETE CBC W/AUTO DIFF WBC: CPT

## 2023-06-26 PROCEDURE — 84550 ASSAY OF BLOOD/URIC ACID: CPT

## 2023-06-26 PROCEDURE — G0463: CPT

## 2023-06-26 PROCEDURE — 59025 FETAL NON-STRESS TEST: CPT

## 2023-06-26 PROCEDURE — 85610 PROTHROMBIN TIME: CPT

## 2023-06-26 PROCEDURE — 99212 OFFICE O/P EST SF 10 MIN: CPT

## 2023-06-26 PROCEDURE — 85384 FIBRINOGEN ACTIVITY: CPT

## 2023-06-26 RX ORDER — BLOOD-GLUCOSE METER
KIT MISCELLANEOUS 4 TIMES DAILY
Qty: 2 | Refills: 2 | Status: ACTIVE | COMMUNITY
Start: 2023-06-26 | End: 1900-01-01

## 2023-06-26 RX ORDER — URINE ACETONE TEST STRIPS
STRIP MISCELLANEOUS
Qty: 1 | Refills: 0 | Status: ACTIVE | COMMUNITY
Start: 2023-06-26 | End: 1900-01-01

## 2023-06-26 RX ORDER — LANCETS 33 GAUGE
EACH MISCELLANEOUS
Qty: 1 | Refills: 2 | Status: ACTIVE | COMMUNITY
Start: 2023-06-26 | End: 1900-01-01

## 2023-06-26 RX ORDER — BLOOD-GLUCOSE METER
W/DEVICE KIT MISCELLANEOUS
Qty: 1 | Refills: 0 | Status: ACTIVE | COMMUNITY
Start: 2023-06-26 | End: 1900-01-01

## 2023-06-26 NOTE — OB PROVIDER TRIAGE NOTE - NSICDXPASTSURGICALHX_GEN_ALL_CORE_FT
PAST SURGICAL HISTORY:  S/P D&C (status post dilation and curettage)     [place] teeth extracted x2

## 2023-06-26 NOTE — OB PROVIDER TRIAGE NOTE - HISTORY OF PRESENT ILLNESS
Triage    Subjective  HPI: 43yo  at 32w3d presents for elevated blood pressure in the clinic. Patient reports she went to the clinic for a growth scan this morning and was found to have elevated BP to 150/76. She takes her baseline blood pressures at home due to prior hx of PEC in last pregnancy. Baseline is typically 120s/70s-80s. Patient denies headaches, SOB, blurry vision, other visual changes, RUQ pain, or swelling of the LEs. She reports +FM. -LOF. -CTXs. -VB. Pt denies any other concerns.    PNC: hx of elevated blood pressures >140 SBP and >90 DBP prior to 20 weeks per  documentation; GDMA1 (diagnosed this week; spoke with DB educator); gestational hypothyroidism (managed on levothyroxine). GBS unknown.  EFW 2000g by oxana.  OBHx: severe preeclampsia with pCS () at 34w6d delivery (fetus was 4lb 11oz); termination at 7 weeks via D&C ()  GynHx: denies hx of fibroids, cysts, or polyps; denies abnormal pap smears; denies hx of STIs/STDs  PMH: denies other medical history  PPH: denies hx of anxiety or depression  PSH: pCS ()  Meds: PNV, levothyroxine 50mg daily, ASA 81mg q2d, 162mg q2d  Allergies: NKDA  Social: denies substance use, lives at home with , reports feeling safe at home       Triage    Subjective  HPI: 41yo  at 32w3d presents for elevated blood pressure in [facility]. Patient reports she went to [facility] for a growth scan this morning and was found to have elevated BP to 150/76. She takes her baseline blood pressures at home due to prior hx of PEC in last pregnancy. Baseline is typically 120s/70s-80s. Patient denies headaches, SOB, blurry vision, other visual changes, RUQ pain, or swelling of the LEs. She reports +FM. -LOF. -CTXs. -VB. Pt denies any other concerns.    PNC: hx of elevated blood pressures >140 SBP and >90 DBP prior to 20 weeks per  documentation; GDMA1 (diagnosed this week; spoke with DB educator); gestational hypothyroidism (managed on levothyroxine). GBS unknown.  EFW 2000g by oxana.  OBHx: severe preeclampsia with pCS () at 34w6d delivery (fetus was 4lb 11oz); termination at 7 weeks via D&C ()  GynHx: denies hx of fibroids, cysts, or polyps; denies abnormal pap smears; denies hx of STIs/STDs  PMH: denies other medical history  PPH: denies hx of anxiety or depression  PSH: pCS ()  Meds: PNV, levothyroxine 50mg daily, ASA 81mg q2d, 162mg q2d  Allergies: NKDA  Social: denies substance use, lives at home with , reports feeling safe at home

## 2023-06-26 NOTE — OB PROVIDER TRIAGE NOTE - NSHPPHYSICALEXAM_GEN_ALL_CORE
Objective  VS  T(C): 36.9 (06-26-23 @ 17:24)  HR: 98 (06-26-23 @ 17:50)  BP: 144/83 (06-26-23 @ 17:44)  RR: 17 (06-26-23 @ 17:24)  SpO2: 97% (06-26-23 @ 17:50)    PE:   CV: clinically well perfused  Pulm: breathing comfortably on RA  Abd: gravid, nontender, soft, no POP of RUQ  Extr: moving all extremities with ease, no swelling of the LEs      FHT: baseline 155bpm, mod variability, +accels, -decels  [place]: acontractile
(4) walks frequently

## 2023-06-26 NOTE — OB PROVIDER TRIAGE NOTE - NSOBPROVIDERNOTE_OBGYN_ALL_OB_FT
Assessment  43yo  at 32w3d presents for elevated blood pressure in the clinic, r/o PEC v severe PEC.    Plan  - IVF  - Monitor blood pressures. Patient meets criteria for gestational hypertension with elevated blood pressures in the office and elevated blood pressures on arrival in triage.   - Continuous EFM  - HELLP labs    Elizabeth Packer, MS4 Assessment  43yo  at 32w3d presents for elevated blood pressure in M, r/o PEC    Plan    - Monitor blood pressures. Patient meets criteria for gestational hypertension with elevated blood pressures in the office and elevated blood pressures on arrival in triage.   - Continuous EFM/toco  - HELLP labs    Elizabeth Packer, MS4  I agree with H&P    Sirisha De Dios [facility]-BC Assessment  43yo  at 32w3d presents for elevated blood pressure in M, r/o PEC    Plan    - Monitor blood pressures. Patient meets criteria for gestational hypertension with elevated blood pressures in the office and elevated blood pressures on arrival in triage.   - Continuous EFM/toco  - HELLP labs    Elizabeth Cristoferin, MS4  I agree with H&P    Sirisha De Dios [facility]-BC    **R2 Update**    T(C): 36.9 (23 @ 21:45), Max: 36.9 (23 @ 17:24)  HR: 85 (23 @ 21:39) (83 - 120)  BP: 131/81 (23 @ 21:29) (116/69 - 144/83)  RR: 17 (23 @ 17:24) (17 - 17)  SpO2: 98% (23 @ 21:39) (94% - 100%)      HELLP labs wnl, P/C 0.1. Patient had 4hrs of non-severe range BPs and continues to be asymptomatic. Patient to be discharged home with PEC return precautions.    d/w Dr. Randa Srinivasan, PGY2

## 2023-06-26 NOTE — OB RN TRIAGE NOTE - NSICDXPASTSURGICALHX_GEN_ALL_CORE_FT
PAST SURGICAL HISTORY:  S/P D&C (status post dilation and curettage)     [place] teeth extracted x2

## 2023-06-27 ENCOUNTER — NON-APPOINTMENT (OUTPATIENT)
Age: 43
End: 2023-06-27

## 2023-06-28 DIAGNOSIS — O09.523 SUPERVISION OF ELDERLY MULTIGRAVIDA, THIRD TRIMESTER: ICD-10-CM

## 2023-06-28 DIAGNOSIS — R03.0 ELEVATED BLOOD-PRESSURE READING, WITHOUT DIAGNOSIS OF HYPERTENSION: ICD-10-CM

## 2023-06-28 DIAGNOSIS — E03.9 HYPOTHYROIDISM, UNSPECIFIED: ICD-10-CM

## 2023-06-28 DIAGNOSIS — Z3A.32 32 WEEKS GESTATION OF PREGNANCY: ICD-10-CM

## 2023-06-28 DIAGNOSIS — O24.410 GESTATIONAL DIABETES MELLITUS IN PREGNANCY, DIET CONTROLLED: ICD-10-CM

## 2023-06-28 DIAGNOSIS — O99.283 ENDOCRINE, NUTRITIONAL AND METABOLIC DISEASES COMPLICATING PREGNANCY, THIRD TRIMESTER: ICD-10-CM

## 2023-06-28 DIAGNOSIS — O14.13 SEVERE PRE-ECLAMPSIA, THIRD TRIMESTER: ICD-10-CM

## 2023-06-28 DIAGNOSIS — O26.893 OTHER SPECIFIED PREGNANCY RELATED CONDITIONS, THIRD TRIMESTER: ICD-10-CM

## 2023-06-28 DIAGNOSIS — O34.219 MATERNAL CARE FOR UNSPECIFIED TYPE SCAR FROM PREVIOUS CESAREAN DELIVERY: ICD-10-CM

## 2023-07-03 ENCOUNTER — APPOINTMENT (OUTPATIENT)
Dept: OBGYN | Facility: CLINIC | Age: 43
End: 2023-07-03
Payer: COMMERCIAL

## 2023-07-03 ENCOUNTER — ASOB RESULT (OUTPATIENT)
Age: 43
End: 2023-07-03

## 2023-07-03 VITALS — WEIGHT: 132 LBS | BODY MASS INDEX: 25.78 KG/M2 | DIASTOLIC BLOOD PRESSURE: 92 MMHG | SYSTOLIC BLOOD PRESSURE: 155 MMHG

## 2023-07-03 DIAGNOSIS — Z3A.33 33 WEEKS GESTATION OF PREGNANCY: ICD-10-CM

## 2023-07-03 PROCEDURE — 76818 FETAL BIOPHYS PROFILE W/NST: CPT

## 2023-07-03 PROCEDURE — 0502F SUBSEQUENT PRENATAL CARE: CPT

## 2023-07-05 LAB
ALBUMIN SERPL ELPH-MCNC: 3.4 G/DL
ALP BLD-CCNC: 122 U/L
ALT SERPL-CCNC: 23 U/L
ANION GAP SERPL CALC-SCNC: 14 MMOL/L
AST SERPL-CCNC: 27 U/L
BILIRUB SERPL-MCNC: 0.2 MG/DL
BUN SERPL-MCNC: 10 MG/DL
CALCIUM SERPL-MCNC: 8.8 MG/DL
CHLORIDE SERPL-SCNC: 104 MMOL/L
CO2 SERPL-SCNC: 22 MMOL/L
CREAT SERPL-MCNC: 0.66 MG/DL
EGFR: 114 ML/MIN/1.73M2
GLUCOSE SERPL-MCNC: 78 MG/DL
POTASSIUM SERPL-SCNC: 3.6 MMOL/L
PROT SERPL-MCNC: 6.2 G/DL
SODIUM SERPL-SCNC: 140 MMOL/L
T4 FREE SERPL-MCNC: 1.1 NG/DL
TSH SERPL-ACNC: 1.87 UIU/ML
URATE SERPL-MCNC: 4.7 MG/DL

## 2023-07-06 ENCOUNTER — ASOB RESULT (OUTPATIENT)
Age: 43
End: 2023-07-06

## 2023-07-06 ENCOUNTER — APPOINTMENT (OUTPATIENT)
Dept: MATERNAL FETAL MEDICINE | Facility: CLINIC | Age: 43
End: 2023-07-06
Payer: COMMERCIAL

## 2023-07-06 PROCEDURE — G0108 DIAB MANAGE TRN  PER INDIV: CPT | Mod: 95

## 2023-07-10 ENCOUNTER — APPOINTMENT (OUTPATIENT)
Dept: OBGYN | Facility: CLINIC | Age: 43
End: 2023-07-10
Payer: COMMERCIAL

## 2023-07-10 ENCOUNTER — ASOB RESULT (OUTPATIENT)
Age: 43
End: 2023-07-10

## 2023-07-10 VITALS — WEIGHT: 131 LBS | BODY MASS INDEX: 25.58 KG/M2

## 2023-07-10 DIAGNOSIS — Z3A.34 34 WEEKS GESTATION OF PREGNANCY: ICD-10-CM

## 2023-07-10 DIAGNOSIS — O13.9 GESTATIONAL [PREGNANCY-INDUCED] HYPERTENSION W/OUT SIGNIFICANT PROTEINURIA, UNSPECIFIED TRIMESTER: ICD-10-CM

## 2023-07-10 PROCEDURE — 76816 OB US FOLLOW-UP PER FETUS: CPT

## 2023-07-10 PROCEDURE — 0502F SUBSEQUENT PRENATAL CARE: CPT

## 2023-07-17 ENCOUNTER — APPOINTMENT (OUTPATIENT)
Dept: OBGYN | Facility: CLINIC | Age: 43
End: 2023-07-17
Payer: COMMERCIAL

## 2023-07-17 ENCOUNTER — ASOB RESULT (OUTPATIENT)
Age: 43
End: 2023-07-17

## 2023-07-17 VITALS — BODY MASS INDEX: 25.58 KG/M2 | WEIGHT: 131 LBS

## 2023-07-17 DIAGNOSIS — Z34.90 ENCOUNTER FOR SUPERVISION OF NORMAL PREGNANCY, UNSPECIFIED, UNSPECIFIED TRIMESTER: ICD-10-CM

## 2023-07-17 DIAGNOSIS — Z98.891 HISTORY OF UTERINE SCAR FROM PREVIOUS SURGERY: ICD-10-CM

## 2023-07-17 DIAGNOSIS — E03.9 ENDOCRINE, NUTRITIONAL AND METABOLIC DISEASES COMPLICATING PREGNANCY, UNSPECIFIED TRIMESTER: ICD-10-CM

## 2023-07-17 DIAGNOSIS — O99.280 ENDOCRINE, NUTRITIONAL AND METABOLIC DISEASES COMPLICATING PREGNANCY, UNSPECIFIED TRIMESTER: ICD-10-CM

## 2023-07-17 PROCEDURE — 0502F SUBSEQUENT PRENATAL CARE: CPT

## 2023-07-17 PROCEDURE — 76818 FETAL BIOPHYS PROFILE W/NST: CPT

## 2023-07-20 ENCOUNTER — ASOB RESULT (OUTPATIENT)
Age: 43
End: 2023-07-20

## 2023-07-20 ENCOUNTER — APPOINTMENT (OUTPATIENT)
Dept: MATERNAL FETAL MEDICINE | Facility: CLINIC | Age: 43
End: 2023-07-20
Payer: COMMERCIAL

## 2023-07-20 PROCEDURE — G0108 DIAB MANAGE TRN  PER INDIV: CPT | Mod: 95

## 2023-07-20 NOTE — DISCHARGE NOTE OB - LAUNCH MEDICATION RECONCILIATION
Patient discharged home with instruction on antibiotics, S&S of infection, and instructed to follow up with primary care for suture removal in 5-7 days.   
<<-----Click here for Discharge Medication Review

## 2023-07-24 ENCOUNTER — APPOINTMENT (OUTPATIENT)
Dept: OBGYN | Facility: CLINIC | Age: 43
End: 2023-07-24
Payer: COMMERCIAL

## 2023-07-24 ENCOUNTER — APPOINTMENT (OUTPATIENT)
Dept: ANTEPARTUM | Facility: CLINIC | Age: 43
End: 2023-07-24

## 2023-07-24 ENCOUNTER — ASOB RESULT (OUTPATIENT)
Age: 43
End: 2023-07-24

## 2023-07-24 ENCOUNTER — OUTPATIENT (OUTPATIENT)
Dept: OUTPATIENT SERVICES | Facility: HOSPITAL | Age: 43
LOS: 1 days | End: 2023-07-24
Payer: COMMERCIAL

## 2023-07-24 VITALS
HEIGHT: 60 IN | DIASTOLIC BLOOD PRESSURE: 76 MMHG | SYSTOLIC BLOOD PRESSURE: 140 MMHG | BODY MASS INDEX: 25.52 KG/M2 | WEIGHT: 130 LBS

## 2023-07-24 VITALS
RESPIRATION RATE: 18 BRPM | HEART RATE: 90 BPM | SYSTOLIC BLOOD PRESSURE: 144 MMHG | HEIGHT: 60 IN | TEMPERATURE: 98 F | WEIGHT: 128.09 LBS | DIASTOLIC BLOOD PRESSURE: 84 MMHG | OXYGEN SATURATION: 100 %

## 2023-07-24 VITALS — DIASTOLIC BLOOD PRESSURE: 66 MMHG | SYSTOLIC BLOOD PRESSURE: 133 MMHG

## 2023-07-24 DIAGNOSIS — Z3A.36 36 WEEKS GESTATION OF PREGNANCY: ICD-10-CM

## 2023-07-24 DIAGNOSIS — Z29.9 ENCOUNTER FOR PROPHYLACTIC MEASURES, UNSPECIFIED: ICD-10-CM

## 2023-07-24 DIAGNOSIS — O34.211 MATERNAL CARE FOR LOW TRANSVERSE SCAR FROM PREVIOUS CESAREAN DELIVERY: ICD-10-CM

## 2023-07-24 DIAGNOSIS — K08.409 PARTIAL LOSS OF TEETH, UNSPECIFIED CAUSE, UNSPECIFIED CLASS: Chronic | ICD-10-CM

## 2023-07-24 DIAGNOSIS — Z98.890 OTHER SPECIFIED POSTPROCEDURAL STATES: Chronic | ICD-10-CM

## 2023-07-24 DIAGNOSIS — O24.410 GESTATIONAL DIABETES MELLITUS IN PREGNANCY, DIET CONTROLLED: ICD-10-CM

## 2023-07-24 DIAGNOSIS — Z01.818 ENCOUNTER FOR OTHER PREPROCEDURAL EXAMINATION: ICD-10-CM

## 2023-07-24 DIAGNOSIS — Z98.891 HISTORY OF UTERINE SCAR FROM PREVIOUS SURGERY: Chronic | ICD-10-CM

## 2023-07-24 LAB
A1C WITH ESTIMATED AVERAGE GLUCOSE RESULT: 5.6 % — SIGNIFICANT CHANGE UP (ref 4–5.6)
ANION GAP SERPL CALC-SCNC: 13 MMOL/L — SIGNIFICANT CHANGE UP (ref 5–17)
BLD GP AB SCN SERPL QL: NEGATIVE — SIGNIFICANT CHANGE UP
BUN SERPL-MCNC: 9 MG/DL — SIGNIFICANT CHANGE UP (ref 7–23)
CALCIUM SERPL-MCNC: 9 MG/DL — SIGNIFICANT CHANGE UP (ref 8.4–10.5)
CHLORIDE SERPL-SCNC: 102 MMOL/L — SIGNIFICANT CHANGE UP (ref 96–108)
CO2 SERPL-SCNC: 21 MMOL/L — LOW (ref 22–31)
CREAT SERPL-MCNC: 0.73 MG/DL — SIGNIFICANT CHANGE UP (ref 0.5–1.3)
EGFR: 107 ML/MIN/1.73M2 — SIGNIFICANT CHANGE UP
ESTIMATED AVERAGE GLUCOSE: 114 MG/DL — SIGNIFICANT CHANGE UP (ref 68–114)
GLUCOSE SERPL-MCNC: 76 MG/DL — SIGNIFICANT CHANGE UP (ref 70–99)
HCT VFR BLD CALC: 39.6 % — SIGNIFICANT CHANGE UP (ref 34.5–45)
HGB BLD-MCNC: 12.9 G/DL — SIGNIFICANT CHANGE UP (ref 11.5–15.5)
MCHC RBC-ENTMCNC: 31 PG — SIGNIFICANT CHANGE UP (ref 27–34)
MCHC RBC-ENTMCNC: 32.6 GM/DL — SIGNIFICANT CHANGE UP (ref 32–36)
MCV RBC AUTO: 95.2 FL — SIGNIFICANT CHANGE UP (ref 80–100)
NRBC # BLD: 0 /100 WBCS — SIGNIFICANT CHANGE UP (ref 0–0)
PLATELET # BLD AUTO: 264 K/UL — SIGNIFICANT CHANGE UP (ref 150–400)
POTASSIUM SERPL-MCNC: 4.1 MMOL/L — SIGNIFICANT CHANGE UP (ref 3.5–5.3)
POTASSIUM SERPL-SCNC: 4.1 MMOL/L — SIGNIFICANT CHANGE UP (ref 3.5–5.3)
RBC # BLD: 4.16 M/UL — SIGNIFICANT CHANGE UP (ref 3.8–5.2)
RBC # FLD: 12.1 % — SIGNIFICANT CHANGE UP (ref 10.3–14.5)
RH IG SCN BLD-IMP: POSITIVE — SIGNIFICANT CHANGE UP
SODIUM SERPL-SCNC: 136 MMOL/L — SIGNIFICANT CHANGE UP (ref 135–145)
WBC # BLD: 7.05 K/UL — SIGNIFICANT CHANGE UP (ref 3.8–10.5)
WBC # FLD AUTO: 7.05 K/UL — SIGNIFICANT CHANGE UP (ref 3.8–10.5)

## 2023-07-24 PROCEDURE — 76818 FETAL BIOPHYS PROFILE W/NST: CPT | Mod: 59

## 2023-07-24 PROCEDURE — 86900 BLOOD TYPING SEROLOGIC ABO: CPT

## 2023-07-24 PROCEDURE — 80048 BASIC METABOLIC PNL TOTAL CA: CPT

## 2023-07-24 PROCEDURE — 83036 HEMOGLOBIN GLYCOSYLATED A1C: CPT

## 2023-07-24 PROCEDURE — 85027 COMPLETE CBC AUTOMATED: CPT

## 2023-07-24 PROCEDURE — 0502F SUBSEQUENT PRENATAL CARE: CPT

## 2023-07-24 PROCEDURE — 86901 BLOOD TYPING SEROLOGIC RH(D): CPT

## 2023-07-24 PROCEDURE — 36415 COLL VENOUS BLD VENIPUNCTURE: CPT

## 2023-07-24 PROCEDURE — 76816 OB US FOLLOW-UP PER FETUS: CPT

## 2023-07-24 PROCEDURE — 86850 RBC ANTIBODY SCREEN: CPT

## 2023-07-24 PROCEDURE — G0463: CPT

## 2023-07-24 RX ORDER — SODIUM CHLORIDE 9 MG/ML
1000 INJECTION, SOLUTION INTRAVENOUS
Refills: 0 | Status: DISCONTINUED | OUTPATIENT
Start: 2023-07-28 | End: 2023-07-28

## 2023-07-24 RX ORDER — SODIUM CHLORIDE 9 MG/ML
1000 INJECTION, SOLUTION INTRAVENOUS ONCE
Refills: 0 | Status: DISCONTINUED | OUTPATIENT
Start: 2023-07-28 | End: 2023-07-28

## 2023-07-24 RX ORDER — OXYTOCIN 10 UNIT/ML
333.33 VIAL (ML) INJECTION
Qty: 20 | Refills: 0 | Status: DISCONTINUED | OUTPATIENT
Start: 2023-07-28 | End: 2023-07-31

## 2023-07-24 NOTE — OB PST NOTE - HISTORY OF PRESENT ILLNESS
43 yo female presents to PST prior to scheduled  on 23 with Dr. Anneliese Nicole. . Pmhx includes hypothyroidism,  (21), preeclampsia (). Current gestational diabetes. Denies chest pain, palpitations, headaches, nausea, leg swelling, dizziness. Endorses appropriate fetal movement.

## 2023-07-24 NOTE — OB PST NOTE - NSICDXPASTSURGICALHX_GEN_ALL_CORE_FT
PAST SURGICAL HISTORY:  H/O:      S/P D&C (status post dilation and curettage)     [place] teeth extracted x2

## 2023-07-24 NOTE — OB PST NOTE - ASSESSMENT
DASI score:  DASI activity:  Loose teeth or denture: denies   Loose teeth or denture: denies    CAPRINI SCORE [CLOT]    AGE RELATED RISK FACTORS                                                       MOBILITY RELATED FACTORS  [ ] Age 41-60 years                                            (1 Point)                  [ ] Bed rest                                                        (1 Point)  [ ] Age: 61-74 years                                           (2 Points)                 [ ] Plaster cast                                                   (2 Points)  [ ] Age= 75 years                                              (3 Points)                 [ ] Bed bound for more than 72 hours                 (2 Points)    DISEASE RELATED RISK FACTORS                                               GENDER SPECIFIC FACTORS  [ ] Edema in the lower extremities                       (1 Point)                  [x ] Pregnancy                                                     (1 Point)  [ ] Varicose veins                                               (1 Point)                  [ ] Post-partum < 6 weeks                                   (1 Point)             [x ] BMI > 25 Kg/m2                                            (1 Point)                  [ ] Hormonal therapy  or oral contraception          (1 Point)                 [ ] Sepsis (in the previous month)                        (1 Point)                  [x ] History of pregnancy complications                 (1 point)  [ ] Pneumonia or serious lung disease                                               [ ] Unexplained or recurrent                     (1 Point)           (in the previous month)                               (1 Point)  [ ] Abnormal pulmonary function test                     (1 Point)                 SURGERY RELATED RISK FACTORS  [ ] Acute myocardial infarction                              (1 Point)                 [x ]  Section                                             (1 Point)  [ ] Congestive heart failure (in the previous month)  (1 Point)               [ ] Minor surgery                                                  (1 Point)   [ ] Inflammatory bowel disease                             (1 Point)                 [ ] Arthroscopic surgery                                        (2 Points)  [ ] Central venous access                                      (2 Points)                [ ] General surgery lasting more than 45 minutes   (2 Points)       [ ] Stroke (in the previous month)                          (5 Points)               [ ] Elective arthroplasty                                         (5 Points)                                                                                                                                               HEMATOLOGY RELATED FACTORS                                                 TRAUMA RELATED RISK FACTORS  [ ] Prior episodes of VTE                                     (3 Points)                [ ] Fracture of the hip, pelvis, or leg                       (5 Points)  [ ] Positive family history for VTE                         (3 Points)                 [ ] Acute spinal cord injury (in the previous month)  (5 Points)  [ ] Prothrombin 71172 A                                     (3 Points)                 [ ] Paralysis  (less than 1 month)                             (5 Points)  [ ] Factor V Leiden                                             (3 Points)                  [ ] Multiple Trauma within 1 month                        (5 Points)  [ ] Lupus anticoagulants                                     (3 Points)                                                           [ ] Anticardiolipin antibodies                               (3 Points)                                                       [ ] High homocysteine in the blood                      (3 Points)                                             [ ] Other congenital or acquired thrombophilia      (3 Points)                                                [ ] Heparin induced thrombocytopenia                  (3 Points)                                          Total Score [    4     ]    Caprini Score 0 - 2:  Low Risk, No VTE Prophylaxis required for most patients, encourage ambulation  Caprini Score 3 - 6:  At Risk, pharmacologic VTE prophylaxis is indicated for most patients (in the absence of a contraindication)  Caprini Score Greater than or = 7:  High Risk, pharmacologic VTE prophylaxis is indicated for most patients (in the absence of a contraindication)

## 2023-07-26 PROBLEM — L30.9 DERMATITIS, UNSPECIFIED: Chronic | Status: ACTIVE | Noted: 2023-07-24

## 2023-07-26 PROBLEM — O14.90 UNSPECIFIED PRE-ECLAMPSIA, UNSPECIFIED TRIMESTER: Chronic | Status: ACTIVE | Noted: 2023-07-24

## 2023-07-27 ENCOUNTER — TRANSCRIPTION ENCOUNTER (OUTPATIENT)
Age: 43
End: 2023-07-27

## 2023-07-27 LAB — B-HEM STREP SPEC QL CULT: NORMAL

## 2023-07-28 ENCOUNTER — NON-APPOINTMENT (OUTPATIENT)
Age: 43
End: 2023-07-28

## 2023-07-28 ENCOUNTER — APPOINTMENT (OUTPATIENT)
Dept: OBGYN | Facility: CLINIC | Age: 43
End: 2023-07-28
Payer: COMMERCIAL

## 2023-07-28 ENCOUNTER — INPATIENT (INPATIENT)
Facility: HOSPITAL | Age: 43
LOS: 2 days | Discharge: HOME | End: 2023-07-31
Attending: OBSTETRICS & GYNECOLOGY | Admitting: OBSTETRICS & GYNECOLOGY
Payer: COMMERCIAL

## 2023-07-28 VITALS
RESPIRATION RATE: 18 BRPM | HEIGHT: 60 IN | SYSTOLIC BLOOD PRESSURE: 144 MMHG | HEART RATE: 86 BPM | WEIGHT: 134.92 LBS | DIASTOLIC BLOOD PRESSURE: 75 MMHG | TEMPERATURE: 98 F | OXYGEN SATURATION: 100 %

## 2023-07-28 DIAGNOSIS — O34.211 MATERNAL CARE FOR LOW TRANSVERSE SCAR FROM PREVIOUS CESAREAN DELIVERY: ICD-10-CM

## 2023-07-28 DIAGNOSIS — K08.409 PARTIAL LOSS OF TEETH, UNSPECIFIED CAUSE, UNSPECIFIED CLASS: Chronic | ICD-10-CM

## 2023-07-28 DIAGNOSIS — Z3A.37 37 WEEKS GESTATION OF PREGNANCY: ICD-10-CM

## 2023-07-28 DIAGNOSIS — O24.410 GESTATIONAL DIABETES MELLITUS IN PREGNANCY, DIET CONTROLLED: ICD-10-CM

## 2023-07-28 DIAGNOSIS — Z98.890 OTHER SPECIFIED POSTPROCEDURAL STATES: Chronic | ICD-10-CM

## 2023-07-28 DIAGNOSIS — Z98.891 HISTORY OF UTERINE SCAR FROM PREVIOUS SURGERY: Chronic | ICD-10-CM

## 2023-07-28 LAB
ALBUMIN SERPL ELPH-MCNC: 3.8 G/DL — SIGNIFICANT CHANGE UP (ref 3.3–5)
ALP SERPL-CCNC: 199 U/L — HIGH (ref 40–120)
ALT FLD-CCNC: 29 U/L — SIGNIFICANT CHANGE UP (ref 10–45)
ANION GAP SERPL CALC-SCNC: 15 MMOL/L — SIGNIFICANT CHANGE UP (ref 5–17)
APTT BLD: 28.1 SEC — SIGNIFICANT CHANGE UP (ref 24.5–35.6)
AST SERPL-CCNC: 26 U/L — SIGNIFICANT CHANGE UP (ref 10–40)
BASOPHILS # BLD AUTO: 0.04 K/UL — SIGNIFICANT CHANGE UP (ref 0–0.2)
BASOPHILS NFR BLD AUTO: 0.5 % — SIGNIFICANT CHANGE UP (ref 0–2)
BILIRUB SERPL-MCNC: 0.3 MG/DL — SIGNIFICANT CHANGE UP (ref 0.2–1.2)
BUN SERPL-MCNC: 11 MG/DL — SIGNIFICANT CHANGE UP (ref 7–23)
CALCIUM SERPL-MCNC: 9.2 MG/DL — SIGNIFICANT CHANGE UP (ref 8.4–10.5)
CHLORIDE SERPL-SCNC: 104 MMOL/L — SIGNIFICANT CHANGE UP (ref 96–108)
CO2 SERPL-SCNC: 20 MMOL/L — LOW (ref 22–31)
COVID-19 SPIKE DOMAIN AB INTERP: POSITIVE
COVID-19 SPIKE DOMAIN ANTIBODY RESULT: >250 U/ML — HIGH
CREAT SERPL-MCNC: 0.68 MG/DL — SIGNIFICANT CHANGE UP (ref 0.5–1.3)
EGFR: 113 ML/MIN/1.73M2 — SIGNIFICANT CHANGE UP
EOSINOPHIL # BLD AUTO: 0.09 K/UL — SIGNIFICANT CHANGE UP (ref 0–0.5)
EOSINOPHIL NFR BLD AUTO: 1.2 % — SIGNIFICANT CHANGE UP (ref 0–6)
FIBRINOGEN PPP-MCNC: 422 MG/DL — SIGNIFICANT CHANGE UP (ref 200–445)
GLUCOSE BLDC GLUCOMTR-MCNC: 82 MG/DL — SIGNIFICANT CHANGE UP (ref 70–99)
GLUCOSE SERPL-MCNC: 85 MG/DL — SIGNIFICANT CHANGE UP (ref 70–99)
HCT VFR BLD CALC: 37.6 % — SIGNIFICANT CHANGE UP (ref 34.5–45)
HGB BLD-MCNC: 12.4 G/DL — SIGNIFICANT CHANGE UP (ref 11.5–15.5)
IMM GRANULOCYTES NFR BLD AUTO: 0.5 % — SIGNIFICANT CHANGE UP (ref 0–0.9)
INR BLD: 0.85 RATIO — SIGNIFICANT CHANGE UP (ref 0.85–1.18)
LDH SERPL L TO P-CCNC: 236 U/L — SIGNIFICANT CHANGE UP (ref 50–242)
LYMPHOCYTES # BLD AUTO: 2.69 K/UL — SIGNIFICANT CHANGE UP (ref 1–3.3)
LYMPHOCYTES # BLD AUTO: 35.8 % — SIGNIFICANT CHANGE UP (ref 13–44)
MAGNESIUM SERPL-MCNC: 5.9 MG/DL — HIGH (ref 1.6–2.6)
MCHC RBC-ENTMCNC: 31 PG — SIGNIFICANT CHANGE UP (ref 27–34)
MCHC RBC-ENTMCNC: 33 GM/DL — SIGNIFICANT CHANGE UP (ref 32–36)
MCV RBC AUTO: 94 FL — SIGNIFICANT CHANGE UP (ref 80–100)
MONOCYTES # BLD AUTO: 0.62 K/UL — SIGNIFICANT CHANGE UP (ref 0–0.9)
MONOCYTES NFR BLD AUTO: 8.3 % — SIGNIFICANT CHANGE UP (ref 2–14)
NEUTROPHILS # BLD AUTO: 4.03 K/UL — SIGNIFICANT CHANGE UP (ref 1.8–7.4)
NEUTROPHILS NFR BLD AUTO: 53.7 % — SIGNIFICANT CHANGE UP (ref 43–77)
NRBC # BLD: 0 /100 WBCS — SIGNIFICANT CHANGE UP (ref 0–0)
PLATELET # BLD AUTO: 247 K/UL — SIGNIFICANT CHANGE UP (ref 150–400)
POTASSIUM SERPL-MCNC: 3.7 MMOL/L — SIGNIFICANT CHANGE UP (ref 3.5–5.3)
POTASSIUM SERPL-SCNC: 3.7 MMOL/L — SIGNIFICANT CHANGE UP (ref 3.5–5.3)
PROT SERPL-MCNC: 7.3 G/DL — SIGNIFICANT CHANGE UP (ref 6–8.3)
PROTHROM AB SERPL-ACNC: 9.4 SEC — LOW (ref 9.5–13)
RBC # BLD: 4 M/UL — SIGNIFICANT CHANGE UP (ref 3.8–5.2)
RBC # FLD: 12.2 % — SIGNIFICANT CHANGE UP (ref 10.3–14.5)
SARS-COV-2 IGG+IGM SERPL QL IA: >250 U/ML — HIGH
SARS-COV-2 IGG+IGM SERPL QL IA: POSITIVE
SODIUM SERPL-SCNC: 139 MMOL/L — SIGNIFICANT CHANGE UP (ref 135–145)
T PALLIDUM AB TITR SER: NEGATIVE — SIGNIFICANT CHANGE UP
URATE SERPL-MCNC: 5.5 MG/DL — SIGNIFICANT CHANGE UP (ref 2.5–7)
WBC # BLD: 7.51 K/UL — SIGNIFICANT CHANGE UP (ref 3.8–10.5)
WBC # FLD AUTO: 7.51 K/UL — SIGNIFICANT CHANGE UP (ref 3.8–10.5)

## 2023-07-28 PROCEDURE — 59510 CESAREAN DELIVERY: CPT

## 2023-07-28 PROCEDURE — 59514 CESAREAN DELIVERY ONLY: CPT

## 2023-07-28 RX ORDER — MORPHINE SULFATE 50 MG/1
0.1 CAPSULE, EXTENDED RELEASE ORAL ONCE
Refills: 0 | Status: DISCONTINUED | OUTPATIENT
Start: 2023-07-28 | End: 2023-07-29

## 2023-07-28 RX ORDER — OXYCODONE HYDROCHLORIDE 5 MG/1
10 TABLET ORAL
Refills: 0 | Status: DISCONTINUED | OUTPATIENT
Start: 2023-07-28 | End: 2023-07-29

## 2023-07-28 RX ORDER — CEFAZOLIN SODIUM 1 G
2000 VIAL (EA) INJECTION ONCE
Refills: 0 | Status: COMPLETED | OUTPATIENT
Start: 2023-07-28 | End: 2023-07-28

## 2023-07-28 RX ORDER — NALOXONE HYDROCHLORIDE 4 MG/.1ML
0.1 SPRAY NASAL
Refills: 0 | Status: DISCONTINUED | OUTPATIENT
Start: 2023-07-28 | End: 2023-07-29

## 2023-07-28 RX ORDER — FAMOTIDINE 10 MG/ML
20 INJECTION INTRAVENOUS ONCE
Refills: 0 | Status: COMPLETED | OUTPATIENT
Start: 2023-07-28 | End: 2023-07-28

## 2023-07-28 RX ORDER — ONDANSETRON 8 MG/1
4 TABLET, FILM COATED ORAL EVERY 6 HOURS
Refills: 0 | Status: DISCONTINUED | OUTPATIENT
Start: 2023-07-28 | End: 2023-07-29

## 2023-07-28 RX ORDER — MAGNESIUM SULFATE 500 MG/ML
4 VIAL (ML) INJECTION ONCE
Refills: 0 | Status: COMPLETED | OUTPATIENT
Start: 2023-07-28 | End: 2023-07-28

## 2023-07-28 RX ORDER — OXYCODONE HYDROCHLORIDE 5 MG/1
5 TABLET ORAL
Refills: 0 | Status: DISCONTINUED | OUTPATIENT
Start: 2023-07-28 | End: 2023-07-29

## 2023-07-28 RX ORDER — CITRIC ACID/SODIUM CITRATE 300-500 MG
15 SOLUTION, ORAL ORAL ONCE
Refills: 0 | Status: COMPLETED | OUTPATIENT
Start: 2023-07-28 | End: 2023-07-28

## 2023-07-28 RX ORDER — NIFEDIPINE 30 MG
30 TABLET, EXTENDED RELEASE 24 HR ORAL DAILY
Refills: 0 | Status: DISCONTINUED | OUTPATIENT
Start: 2023-07-28 | End: 2023-07-30

## 2023-07-28 RX ORDER — MAGNESIUM SULFATE 500 MG/ML
2 VIAL (ML) INJECTION
Qty: 40 | Refills: 0 | Status: DISCONTINUED | OUTPATIENT
Start: 2023-07-28 | End: 2023-07-28

## 2023-07-28 RX ORDER — NALBUPHINE HYDROCHLORIDE 10 MG/ML
2.5 INJECTION, SOLUTION INTRAMUSCULAR; INTRAVENOUS; SUBCUTANEOUS EVERY 6 HOURS
Refills: 0 | Status: DISCONTINUED | OUTPATIENT
Start: 2023-07-28 | End: 2023-07-29

## 2023-07-28 RX ORDER — DEXAMETHASONE 0.5 MG/5ML
4 ELIXIR ORAL EVERY 6 HOURS
Refills: 0 | Status: DISCONTINUED | OUTPATIENT
Start: 2023-07-28 | End: 2023-07-29

## 2023-07-28 RX ORDER — KETOROLAC TROMETHAMINE 30 MG/ML
30 SYRINGE (ML) INJECTION EVERY 6 HOURS
Refills: 0 | Status: DISCONTINUED | OUTPATIENT
Start: 2023-07-28 | End: 2023-07-29

## 2023-07-28 RX ORDER — ACETAMINOPHEN 500 MG
1000 TABLET ORAL ONCE
Refills: 0 | Status: COMPLETED | OUTPATIENT
Start: 2023-07-28 | End: 2023-07-28

## 2023-07-28 RX ADMIN — Medication 300 GRAM(S): at 13:51

## 2023-07-28 RX ADMIN — Medication 400 MILLIGRAM(S): at 20:53

## 2023-07-28 RX ADMIN — Medication 15 MILLILITER(S): at 07:33

## 2023-07-28 RX ADMIN — FAMOTIDINE 20 MILLIGRAM(S): 10 INJECTION INTRAVENOUS at 07:33

## 2023-07-28 RX ADMIN — Medication 30 MILLIGRAM(S): at 13:35

## 2023-07-28 RX ADMIN — Medication 50 GM/HR: at 14:18

## 2023-07-28 RX ADMIN — Medication 1000 MILLIUNIT(S)/MIN: at 10:27

## 2023-07-28 RX ADMIN — Medication 30 MILLIGRAM(S): at 18:32

## 2023-07-28 RX ADMIN — Medication 30 MILLIGRAM(S): at 19:00

## 2023-07-28 NOTE — OB RN PATIENT PROFILE - NSICDXPASTSURGICALHX_GEN_ALL_CORE_FT
PAST SURGICAL HISTORY:  H/O:      S/P D&C (status post dilation and curettage)     [place] teeth extracted x2

## 2023-07-28 NOTE — OB RN PATIENT PROFILE - NSSTATEDREASONFORADM_OBGYN_A_OB_FT
[FreeTextEntry1] : URI. Symptomatic treatment. Follow up if not better over the next few days. Sooner if worse. R C/S

## 2023-07-28 NOTE — OB PROVIDER H&P - HISTORY OF PRESENT ILLNESS
43yo      @   37w 0 d    presents for scheduled repeat  section  Denies contractions, VB or LOF has + FM  Denies HA, visual changes or epigastric pain      PNC: Dr Tolentino  PNI: gHTN with current pregnancy  h/o sPEC with prior pregnancy  PNL: GBS negative    All: No Known Allergies  Meds: PNV, ASA, synthroid 50  PMHx: Hypothryoidism  PSHx:  section  Socialhx: Denies x 3, Denies anxiety or Depression  OBhx:   FT  34w 6 d  pLTCS secondary to sPEC/Mg, breech presentation  4lbs 11 oz  1st tri SAB --> D & C  GYNhx: Denies fibroids, ov cysts or STDs or abnormal pap smears    T(C): 36.6 (23 @ 06:21), Max: 36.6 (23 @ 06:04)  HR: 72 (23 @ 07:15) (66 - 86)  BP: 165/90 (23 @ 07:14) (144/75 - 177/90)  RR: 18 (23 @ 06:21) (18 - 18)  SpO2: 100% (23 @ 07:15) (99% - 100%)    Gen: NAD  Heart: RRR  Lungs: CTA B/L  Abdomen: Gravid, NT  Ext: no calf tenderness    NST: 125 moderate varaiblity + accels no decels  TOCO: irregular  VE: deferred  EFW: 2800

## 2023-07-28 NOTE — OB RN DELIVERY SUMMARY - BABY A: APGAR 1 MIN MUSCLE TONE, DELIVERY
(2) well flexed Crescentic Advancement Flap Text: The defect edges were debeveled with a #15 scalpel blade.  Given the location of the defect and the proximity to free margins a crescentic advancement flap was deemed most appropriate.  Using a sterile surgical marker, the appropriate advancement flap was drawn incorporating the defect and placing the expected incisions within the relaxed skin tension lines where possible.    The area thus outlined was incised deep to adipose tissue with a #15 scalpel blade.  The skin margins were undermined to an appropriate distance in all directions utilizing iris scissors.

## 2023-07-28 NOTE — CHART NOTE - NSCHARTNOTEFT_GEN_A_CORE
On review of BPs, pt meets criteria for PEC with Severe features. HELLP labs wnl. Will start Magnesium for seizure prophylaxis. WIll start on Nifedipine 30XL for BP control. PALMIRA Madrigal and Corey Coleman PA-C

## 2023-07-28 NOTE — OB RN DELIVERY SUMMARY - NS_SEPSISRSKCALC_OBGYN_ALL_OB_FT
EOS calculated successfully. EOS Risk Factor: 0.5/1000 live births ([facility] national incidence); GA=37w;Temp=97.9; ROM=0.017; GBS='Unknown'; Antibiotics='No antibiotics or any antibiotics < 2 hrs prior to birth'

## 2023-07-28 NOTE — OB PROVIDER H&P - NSICDXPASTSURGICALHX_GEN_ALL_CORE_FT
PAST SURGICAL HISTORY:  H/O:      S/P D&C (status post dilation and curettage)     [place] teeth extracted x2

## 2023-07-28 NOTE — OB RN PATIENT PROFILE - FALL HARM RISK - UNIVERSAL INTERVENTIONS
Bed in lowest position, wheels locked, appropriate side rails in place/Call bell, personal items and telephone in reach/Instruct patient to call for assistance before getting out of bed or chair/Non-slip footwear when patient is out of bed/[place] to call system/Physically safe environment - no spills, clutter or unnecessary equipment/Purposeful Proactive Rounding/Room/bathroom lighting operational, light cord in reach

## 2023-07-28 NOTE — OB PROVIDER H&P - NSLOWPPHRISK_OBGYN_A_OB
[place] Pregnancy/Less than or equal to 4 previous vaginal births/No known bleeding disorder/No history of postpartum hemorrhage

## 2023-07-28 NOTE — OB PROVIDER DELIVERY SUMMARY - NSPROVIDERDELIVERYNOTE_OBGYN_ALL_OB_FT
RLTCS baby girl , nl tubes and ovaries and bulky uterus? post fibroid/adenomyosis, apgars 9,9, cord gases obtained RLTCS baby girl , nl tubes and ovaries and bulky uterus? post fibroid/adenomyosis, apgars 9,9, cord gases obtained    Dictation job #[id number]

## 2023-07-28 NOTE — OB PROVIDER H&P - ATTENDING COMMENTS
I have seen and examined this patient.  I agree with the above assessment and plan.     Hedy Tolentino MD

## 2023-07-28 NOTE — PRE-ANESTHESIA EVALUATION ADULT - NS MD HP INPLANTS MED DEV
[facility]  Adult Discharge Orders & Instructions    ________________________________________________________________          For 12 hours after surgery  Get plenty of rest.  A responsible adult must stay with you for at least 12 hours after you leave the hospital.   You may feel lightheaded.  IF so, sit for a few minutes before standing.  Have someone help you get up.   You may have a slight fever. Call the doctor if your fever is over 101 F (38.3 C) (taken under the tongue) or lasts longer than 24 hours.  You may have a dry mouth, a sore throat, muscle aches or trouble sleeping.  These should go away after 24 hours.  Do not make important or legal decisions.  6.   Do not drive or use heavy equipment.  If you have weakness or tingling, don't drive or use heavy equipment until this feeling goes away.    To contact a doctor, call   [phone]_______________________   
None

## 2023-07-28 NOTE — OB RN DELIVERY SUMMARY - NSSELHIDDEN_OBGYN_ALL_OB_FT
[NS_DeliveryAttending1_OBGYN_ALL_OB_FT:[id number]],[NS_DeliveryAttending2_OBGYN_ALL_OB_FT:[id number]],[NS_DeliveryRN_OBGYN_ALL_OB_FT:[id number]]

## 2023-07-28 NOTE — OB RN DELIVERY SUMMARY - NS_NEWBORNAMRN_OBGYN_ALL_OB_FT
[No Acute Distress] : no acute distress [Well Nourished] : well nourished [Well Developed] : well developed [Well-Appearing] : well-appearing [Normal Sclera/Conjunctiva] : normal sclera/conjunctiva [PERRL] : pupils equal round and reactive to light [EOMI] : extraocular movements intact [Normal Outer Ear/Nose] : the outer ears and nose were normal in appearance [Normal Oropharynx] : the oropharynx was normal [No JVD] : no jugular venous distention [No Lymphadenopathy] : no lymphadenopathy [Supple] : supple [Thyroid Normal, No Nodules] : the thyroid was normal and there were no nodules present [No Respiratory Distress] : no respiratory distress  [No Accessory Muscle Use] : no accessory muscle use [Clear to Auscultation] : lungs were clear to auscultation bilaterally [Normal Rate] : normal rate  [Regular Rhythm] : with a regular rhythm [Normal S1, S2] : normal S1 and S2 [No Murmur] : no murmur heard [No Carotid Bruits] : no carotid bruits [No Abdominal Bruit] : a ~M bruit was not heard ~T in the abdomen [No Varicosities] : no varicosities [Pedal Pulses Present] : the pedal pulses are present [No Edema] : there was no peripheral edema [No Palpable Aorta] : no palpable aorta [No Extremity Clubbing/Cyanosis] : no extremity clubbing/cyanosis [Soft] : abdomen soft [Non Tender] : non-tender [Non-distended] : non-distended [No Masses] : no abdominal mass palpated [No HSM] : no HSM [Normal Bowel Sounds] : normal bowel sounds [Normal Posterior Cervical Nodes] : no posterior cervical lymphadenopathy [Normal Anterior Cervical Nodes] : no anterior cervical lymphadenopathy [No CVA Tenderness] : no CVA  tenderness [No Spinal Tenderness] : no spinal tenderness [No Joint Swelling] : no joint swelling [Grossly Normal Strength/Tone] : grossly normal strength/tone [No Rash] : no rash [Coordination Grossly Intact] : coordination grossly intact [id number] [No Focal Deficits] : no focal deficits [Normal Gait] : normal gait [Deep Tendon Reflexes (DTR)] : deep tendon reflexes were 2+ and symmetric [Normal Affect] : the affect was normal [Normal Insight/Judgement] : insight and judgment were intact [de-identified] : mult. skin tags to the b/l ears [de-identified] : Skin tags in and around ears [de-identified] : Range of motion cervical lumbar spine improved.  Straight leg raise negative Spurling negative [de-identified] : Patient is no longer anxious or depressed

## 2023-07-28 NOTE — OB PROVIDER DELIVERY SUMMARY - NSSELHIDDEN_OBGYN_ALL_OB_FT
[NS_DeliveryAttending1_OBGYN_ALL_OB_FT:[id number]==],[NS_DeliveryAttending2_OBGYN_ALL_OB_FT:[id number]=],[NS_DeliveryRN_OBGYN_ALL_OB_FT:[id number]]

## 2023-07-28 NOTE — OB RN DELIVERY SUMMARY - NS_NUCHALCORD_OBGYN_ALL_OB
[facility]    Neurology Consultation    Ronna Rivera MRN# [id number]   YOB: 1983 Age: 37 year old    Code Status:Full Code   Date of Admission: 10/27/2021  Date of Consult:10/27/2021                                                                                       Assessment and Plan:                                         #.  Acute head pain-gradual onset yesterday during IVIG infusion.  IVIG is known to be associated with headache as well as aseptic meningitis as a side effect.  Her presentation is most consistent with migraine although aseptic meningitis would be part of the differential diagnosis.  --Recommend prednisone taper over the next 12 days.  Sumatriptan as needed.  If headache does not improve, consider further imaging or spinal tap.  Recommend that she discuss the continued use of the IVIG with her provider at [facility].      ----------------------------------------------------------------------------------  ----------------------------------------------------------------------------------  Reason for consult: as above       Chief Complaint:   Head pain  History is obtained from the patient / chart       History of Present Illness:   This patient is a 37 year old female who presents with head pain which started yesterday October 26.  She reports that she had a mild headache while receiving her second IVIG infusion.  She is receiving this for treatment of erythromelalgia through her provider/neurologist at [facility].  This was also recommended by neurology providers at [facility].    Patient does have a history of migraines since she was a teenager.  She started to experience visual disturbance/aura with her migraines about 4 years ago for which he saw neurology.  She recalls that she was treated with topiramate which caused anxiety.  For a while she had been using sumatriptan with some relief.    Since then she has been experiencing occasional migraines for which she  will use ibuprofen successfully.    The current headache is associated with nausea but no vomiting, photophobia and phonophobia.  She initially tried to treat it with ice pack yesterday.  It built up in intensity and she received medications through the emergency room last night.  The pain returned and she returned again.  She reports that this afternoon she is feeling more comfortable and the pain has subsided.    The patient does receive Norco nightly for management of her pain related to erythromelagia pain.    With the current headache she denies any new visual disturbances or arm or leg weakness.  Denies any numbness or tingling.  Her  is at bedside and confirmed this history         Past Medical History:     Past Medical History:   Diagnosis Date     Reactive depression      Seasonal allergies 6/25/2020    Mostly resolved   erythromelalgia  Chronic pain        Past Surgical History:     Past Surgical History:   Procedure Laterality Date     INSERT PICC LINE Left 6/23/2021    Procedure: INSERTION, PICC;  Surgeon: Neal Penny MD;  Location: [facility] OR     IR PICC PLACEMENT > 5 YRS OF AGE  6/23/2021     NO HISTORY OF SURGERY            Social History:     Social History     Socioeconomic History     Marital status:      Spouse name: Shiraz     Number of children: 2     Years of education: 16     Highest education level: Not on file   Occupational History     Employer: UNEMPLOYED   Tobacco Use     Smoking status: Never Smoker     Smokeless tobacco: Never Used   Substance and Sexual Activity     Alcohol use: No     Drug use: No     Sexual activity: Yes     Partners: Male   Other Topics Concern      Service No     Blood Transfusions No     Caffeine Concern No     Occupational Exposure No     Hobby Hazards No     Sleep Concern No     Stress Concern No     Weight Concern No     Special Diet No     Back Care Yes     Comment: low back pain, with period     Exercise Yes     Comment: 3 times  weekly, treadmill     Bike Helmet Yes     Seat Belt Yes     Self-Exams No   Social History Narrative    Lives with , 2 kids (born 2009, both autism). No guns.  Safe home and environment.  Support from friends family and [employer] community.     Social Determinants of Health     Financial Resource Strain: Low Risk      Difficulty of Paying Living Expenses: Not hard at all   Food Insecurity: No Food Insecurity     Worried About Running Out of Food in the Last Year: Never true     Ran Out of Food in the Last Year: Never true   Transportation Needs: No Transportation Needs     Lack of Transportation (Medical): No     Lack of Transportation (Non-Medical): No   Physical Activity:      Days of Exercise per Week:      Minutes of Exercise per Session:    Stress:      Feeling of Stress :    Social Connections: Unknown     Frequency of Communication with Friends and Family: Not on file     Frequency of Social Gatherings with Friends and Family: More than three times a week     Attends Confucianism Services: Not on file     Active Member of Clubs or Organizations: Not asked     Attends Club or Organization Meetings: Not on file     Marital Status:    Intimate Partner Violence:      Fear of Current or Ex-Partner:      Emotionally Abused:      Physically Abused:      Sexually Abused:      Patient denies smoking, no significant alcohol intake, denies illicit drugs use  Reports a stressful lifestyle as she has 2 autistic children in addition to her medical concerns      Family History:     Family History   Problem Relation Age of Onset     Hypertension Father      Cerebrovascular Disease Maternal Grandmother      Diabetes Maternal Grandfather      Breast Cancer Paternal Grandmother      Hypertension Paternal Grandfather      C.A.D. Paternal Grandfather      Reviewed and not felt to be contributory.        Home Medications:     Prior to Admission Medications   Prescriptions Last Dose Informant Patient Reported? Taking?    DULoxetine (CYMBALTA) 20 MG capsule 10/26/2021 at AM  Yes Yes   Sig: Take 40 mg by mouth daily    Vitamin D3 (CHOLECALCIFEROL) 25 mcg (1000 units) tablet 10/26/2021 at AM  Yes Yes   Sig: Take 25 mcg by mouth daily   gabapentin (NEURONTIN) 600 MG tablet 10/26/2021 at PM  Yes Yes   Sig: Take 300 mg by mouth At Bedtime   ibuprofen (ADVIL/MOTRIN) 200 MG tablet 10/26/2021 at 2300  Yes Yes   Sig: Take 400 mg by mouth every 6 hours as needed for moderate pain   melatonin 1 MG TABS tablet Past Week at Unknown time Spouse/Significant Other Yes Yes   Sig: Take 2-5 mg by mouth nightly as needed for sleep    mexiletine (MEXITIL) 150 MG capsule 10/26/2021 at PM  No Yes   Sig: Take 1 capsule (150 mg) by mouth 3 times daily   Patient taking differently: Take 150 mg by mouth At Bedtime    multivitamin, therapeutic (THERA-VIT) TABS tablet 10/26/2021 at AM  Yes Yes   Sig: Take 1 tablet by mouth daily   sulfamethoxazole-trimethoprim (BACTRIM DS) 800-160 MG tablet Not Taking at Unknown time  No No   Sig: Take 1 tablet by mouth three times a week (Monday, Wednesday, and Friday)   Patient not taking: Reported on 10/27/2021   vitamin C (ASCORBIC ACID) 250 MG tablet 10/26/2021 at AM  Yes Yes   Sig: Take 250 mg by mouth daily      Facility-Administered Medications: None          Allergy:     Allergies   Allergen Reactions     Topiramate Anxiety          Inpatient Medications:   Scheduled Meds:    sodium chloride (PF)  3 mL Intracatheter Q8H     PRN Meds: acetaminophen **OR** acetaminophen, diphenhydrAMINE, ibuprofen, ketorolac, lidocaine 4%, lidocaine (buffered or not buffered), melatonin, ondansetron **OR** ondansetron, oxyCODONE, prochlorperazine **OR** prochlorperazine **OR** prochlorperazine, senna-docusate **OR** senna-docusate, sodium chloride (PF)        Review of Systems    Otherwise noncontributory  NEURO: see HPI       Physical Exam:   Physical Exam   Vitals:  Height:Data Unavailable  Weight:0 lbs 0 oz   Temp: 98.8  F (37.1  C)  Temp src: Oral BP: 114/70 Pulse: 88   Resp: 18 SpO2: 96 % O2 Device: None (Room air)    General Appearance: Uncomfortable, tight cervical musculature/trapezii with point tenderness  Neuro:       Mental Status Exam:    Alert and oriented.  Language and speech intact.  Fund of knowledge normal.       Cranial Nerves: 2 through 12 intact.  Fundus: Technically difficult          Motor:   Tone bulk and strength normal x4           Reflexes: Symmetrically intact.  Plantar signs normal.  No clonus       Sensory: Vibration and cold sense intact in all 4 extremities                   Coordination:   Intact x4.       Gait: Not tested due to concerns about fall risk with ongoing head pain  Neck: no nuchal rigidity, normal thyroid. No carotid bruits.    Extremities: No clubbing, no cyanosis, no edema       Data:   ROUTINE IP LABS   CBC RESULTS:     Recent Labs   Lab 10/26/21  2349   WBC 8.0   RBC 4.03   HGB 12.5   HCT 36.9        Basic Metabolic Panel:   Recent Labs   Lab Test 10/26/21  2349 04/09/21  1725    139   POTASSIUM 3.5 3.8   CHLORIDE 103 107   CO2 25 29   BUN 22 14   CR 0.78 0.81   * 118*   KEVAN 8.3* 8.7     Liver panel:  Recent Labs   Lab Test 10/26/21  2349 06/04/21  1655 04/09/21  1725   PROTTOTAL 7.7 6.9 6.7*   ALBUMIN 3.5 3.7 3.7   BILITOTAL 0.2 0.3 0.4   ALKPHOS 88 90 73   AST 26 23 67*   ALT 36 78* 121*     Lipid Profile:No lab results found.  Thyroid Panel:  Recent Labs   Lab Test 06/28/21  1505 06/28/21  1504   TSH  --  0.20*   T4  --  1.05   FT3 2.9  --       Vitamin B12:   Recent Labs   Lab Test 06/28/21  1504   B12 257           IMAGING:   All imaging studies were reviewed personally  CT head:   EXAM: CT HEAD W/O CONTRAST  LOCATION: [facility]  DATE/TIME: 10/27/2021 4:58 AM                                                                   IMPRESSION:  1.  No acute intracranial process      Time:  45minutes evaluation and management.     April Townsend,  M.D.  [facility]  Office [phone]              None

## 2023-07-28 NOTE — OB PROVIDER H&P - PROBLEM SELECTOR PLAN 1
- Admit to L & D/labs/IVF/NPO  - Fetal status - NST/TOCO  - GBS negative  - Anesthesia pre-op  - Pre-op meds  - Nursing pre-op  - gHTN - Pt with severe range BP x 2 over 1 hour apart, asymptomatic, HELLP labs ordered  - Consents to be signed  D/W  Dr Randa Fleming PA-C - Admit to L & D/labs/IVF/NPO  - Fetal status - NST/TOCO  - GBS negative  - Anesthesia pre-op  - Pre-op meds  - Nursing pre-op  - gHTN - Pt with severe range BP x 2 over 1 hour apart, asymptomatic, HELLP labs ordered  - Consents to be signed  D/W  Dr Randa Nicole M.D.  Zayra Fleming PA-C

## 2023-07-28 NOTE — OB RN PATIENT PROFILE - FUNCTIONAL ASSESSMENT - DAILY ACTIVITY 6.
Pt is on continuous feeds; if insulin is needed please change from sliding judith to insulin drip Pt is on continuous feeds; if insulin is needed please change from sliding judith to insulin drip. Consider MVI, Thiamine and Folic acid supplementation. 4 = No assist / stand by assistance

## 2023-07-29 LAB
ALBUMIN SERPL ELPH-MCNC: 2.6 G/DL — LOW (ref 3.3–5)
ALP SERPL-CCNC: 136 U/L — HIGH (ref 40–120)
ALT FLD-CCNC: 30 U/L — SIGNIFICANT CHANGE UP (ref 10–45)
ANION GAP SERPL CALC-SCNC: 11 MMOL/L — SIGNIFICANT CHANGE UP (ref 5–17)
APTT BLD: 26.6 SEC — SIGNIFICANT CHANGE UP (ref 24.5–35.6)
AST SERPL-CCNC: 34 U/L — SIGNIFICANT CHANGE UP (ref 10–40)
BASOPHILS # BLD AUTO: 0.03 K/UL — SIGNIFICANT CHANGE UP (ref 0–0.2)
BASOPHILS NFR BLD AUTO: 0.3 % — SIGNIFICANT CHANGE UP (ref 0–2)
BILIRUB SERPL-MCNC: 0.1 MG/DL — LOW (ref 0.2–1.2)
BUN SERPL-MCNC: 7 MG/DL — SIGNIFICANT CHANGE UP (ref 7–23)
CALCIUM SERPL-MCNC: 6.3 MG/DL — CRITICAL LOW (ref 8.4–10.5)
CHLORIDE SERPL-SCNC: 101 MMOL/L — SIGNIFICANT CHANGE UP (ref 96–108)
CO2 SERPL-SCNC: 24 MMOL/L — SIGNIFICANT CHANGE UP (ref 22–31)
CREAT SERPL-MCNC: 0.76 MG/DL — SIGNIFICANT CHANGE UP (ref 0.5–1.3)
EGFR: 102 ML/MIN/1.73M2 — SIGNIFICANT CHANGE UP
EOSINOPHIL # BLD AUTO: 0.06 K/UL — SIGNIFICANT CHANGE UP (ref 0–0.5)
EOSINOPHIL NFR BLD AUTO: 0.5 % — SIGNIFICANT CHANGE UP (ref 0–6)
FIBRINOGEN PPP-MCNC: 287 MG/DL — SIGNIFICANT CHANGE UP (ref 200–445)
GLUCOSE SERPL-MCNC: 111 MG/DL — HIGH (ref 70–99)
HCT VFR BLD CALC: 30.1 % — LOW (ref 34.5–45)
HGB BLD-MCNC: 10 G/DL — LOW (ref 11.5–15.5)
IMM GRANULOCYTES NFR BLD AUTO: 0.3 % — SIGNIFICANT CHANGE UP (ref 0–0.9)
INR BLD: 0.84 RATIO — LOW (ref 0.85–1.18)
LDH SERPL L TO P-CCNC: 446 U/L — HIGH (ref 50–242)
LYMPHOCYTES # BLD AUTO: 19.5 % — SIGNIFICANT CHANGE UP (ref 13–44)
LYMPHOCYTES # BLD AUTO: 2.19 K/UL — SIGNIFICANT CHANGE UP (ref 1–3.3)
MAGNESIUM SERPL-MCNC: 6.5 MG/DL — HIGH (ref 1.6–2.6)
MCHC RBC-ENTMCNC: 31.3 PG — SIGNIFICANT CHANGE UP (ref 27–34)
MCHC RBC-ENTMCNC: 33.2 GM/DL — SIGNIFICANT CHANGE UP (ref 32–36)
MCV RBC AUTO: 94.1 FL — SIGNIFICANT CHANGE UP (ref 80–100)
MONOCYTES # BLD AUTO: 0.82 K/UL — SIGNIFICANT CHANGE UP (ref 0–0.9)
MONOCYTES NFR BLD AUTO: 7.3 % — SIGNIFICANT CHANGE UP (ref 2–14)
NEUTROPHILS # BLD AUTO: 8.1 K/UL — HIGH (ref 1.8–7.4)
NEUTROPHILS NFR BLD AUTO: 72.1 % — SIGNIFICANT CHANGE UP (ref 43–77)
NRBC # BLD: 0 /100 WBCS — SIGNIFICANT CHANGE UP (ref 0–0)
PLATELET # BLD AUTO: 208 K/UL — SIGNIFICANT CHANGE UP (ref 150–400)
POTASSIUM SERPL-MCNC: 3.3 MMOL/L — LOW (ref 3.5–5.3)
POTASSIUM SERPL-SCNC: 3.3 MMOL/L — LOW (ref 3.5–5.3)
PROT SERPL-MCNC: 5.3 G/DL — LOW (ref 6–8.3)
PROTHROM AB SERPL-ACNC: 8.9 SEC — LOW (ref 9.5–13)
RBC # BLD: 3.2 M/UL — LOW (ref 3.8–5.2)
RBC # FLD: 12 % — SIGNIFICANT CHANGE UP (ref 10.3–14.5)
SODIUM SERPL-SCNC: 136 MMOL/L — SIGNIFICANT CHANGE UP (ref 135–145)
URATE SERPL-MCNC: 5.5 MG/DL — SIGNIFICANT CHANGE UP (ref 2.5–7)
WBC # BLD: 11.23 K/UL — HIGH (ref 3.8–10.5)
WBC # FLD AUTO: 11.23 K/UL — HIGH (ref 3.8–10.5)

## 2023-07-29 PROCEDURE — 99231 SBSQ HOSP IP/OBS SF/LOW 25: CPT

## 2023-07-29 RX ORDER — LEVOTHYROXINE SODIUM 125 MCG
50 TABLET ORAL DAILY
Refills: 0 | Status: DISCONTINUED | OUTPATIENT
Start: 2023-07-29 | End: 2023-07-31

## 2023-07-29 RX ORDER — IBUPROFEN 200 MG
600 TABLET ORAL EVERY 6 HOURS
Refills: 0 | Status: COMPLETED | OUTPATIENT
Start: 2023-07-29 | End: 2024-06-26

## 2023-07-29 RX ORDER — IBUPROFEN 200 MG
600 TABLET ORAL EVERY 6 HOURS
Refills: 0 | Status: DISCONTINUED | OUTPATIENT
Start: 2023-07-29 | End: 2023-07-31

## 2023-07-29 RX ORDER — OXYCODONE HYDROCHLORIDE 5 MG/1
5 TABLET ORAL ONCE
Refills: 0 | Status: DISCONTINUED | OUTPATIENT
Start: 2023-07-29 | End: 2023-07-31

## 2023-07-29 RX ORDER — HEPARIN SODIUM 5000 [USP'U]/ML
5000 INJECTION INTRAVENOUS; SUBCUTANEOUS EVERY 12 HOURS
Refills: 0 | Status: DISCONTINUED | OUTPATIENT
Start: 2023-07-29 | End: 2023-07-31

## 2023-07-29 RX ORDER — POTASSIUM CHLORIDE 20 MEQ
20 PACKET (EA) ORAL
Refills: 0 | Status: COMPLETED | OUTPATIENT
Start: 2023-07-29 | End: 2023-07-29

## 2023-07-29 RX ORDER — ACETAMINOPHEN 500 MG
975 TABLET ORAL
Refills: 0 | Status: DISCONTINUED | OUTPATIENT
Start: 2023-07-29 | End: 2023-07-31

## 2023-07-29 RX ADMIN — Medication 20 MILLIEQUIVALENT(S): at 15:08

## 2023-07-29 RX ADMIN — Medication 50 MICROGRAM(S): at 07:24

## 2023-07-29 RX ADMIN — Medication 975 MILLIGRAM(S): at 04:25

## 2023-07-29 RX ADMIN — Medication 30 MILLIGRAM(S): at 00:00

## 2023-07-29 RX ADMIN — Medication 20 MILLIEQUIVALENT(S): at 09:25

## 2023-07-29 RX ADMIN — HEPARIN SODIUM 5000 UNIT(S): 5000 INJECTION INTRAVENOUS; SUBCUTANEOUS at 06:25

## 2023-07-29 RX ADMIN — Medication 975 MILLIGRAM(S): at 21:30

## 2023-07-29 RX ADMIN — Medication 600 MILLIGRAM(S): at 18:40

## 2023-07-29 RX ADMIN — Medication 30 MILLIGRAM(S): at 01:00

## 2023-07-29 RX ADMIN — Medication 975 MILLIGRAM(S): at 20:54

## 2023-07-29 RX ADMIN — Medication 975 MILLIGRAM(S): at 04:17

## 2023-07-29 RX ADMIN — Medication 30 MILLIGRAM(S): at 06:34

## 2023-07-29 RX ADMIN — Medication 600 MILLIGRAM(S): at 18:10

## 2023-07-29 RX ADMIN — Medication 20 MILLIEQUIVALENT(S): at 10:58

## 2023-07-29 RX ADMIN — Medication 975 MILLIGRAM(S): at 15:08

## 2023-07-29 RX ADMIN — Medication 30 MILLIGRAM(S): at 06:25

## 2023-07-29 RX ADMIN — Medication 600 MILLIGRAM(S): at 12:14

## 2023-07-29 RX ADMIN — Medication 975 MILLIGRAM(S): at 09:12

## 2023-07-29 RX ADMIN — Medication 975 MILLIGRAM(S): at 08:42

## 2023-07-29 RX ADMIN — Medication 600 MILLIGRAM(S): at 23:32

## 2023-07-29 RX ADMIN — HEPARIN SODIUM 5000 UNIT(S): 5000 INJECTION INTRAVENOUS; SUBCUTANEOUS at 18:10

## 2023-07-29 RX ADMIN — Medication 975 MILLIGRAM(S): at 15:38

## 2023-07-29 RX ADMIN — Medication 600 MILLIGRAM(S): at 12:44

## 2023-07-29 NOTE — PROGRESS NOTE ADULT - ASSESSMENT
41yo POD #1 s/p LTCS.  Patient is stable and doing well post-operatively.    #sPEC  - cont Mg for 24hrs since start of Mg  - BP overnight wnl, c/w [id number]  - f/u AM HELLP labs    #Routine Post-op care  - Continue regular diet.  - Increase ambulation.  - PO pain medication with Tylenol, Motrin and Oxycodone PRN for pain control.    - POD #1 CBC this morning.   - DVT prophylaxis with Heparin 5000u BID    Amyeo Jereen PGY-3

## 2023-07-29 NOTE — PROGRESS NOTE ADULT - SUBJECTIVE AND OBJECTIVE BOX
Day 1 of Anesthesia Pain Management Service    SUBJECTIVE:  Pain Scale Score:          [X] Refer to charted pain scores    THERAPY:    s/p 100 mcg intrathecal neuraxial PF morphine    MEDICATIONS  (STANDING):  acetaminophen     Tablet .. 975 milliGRAM(s) Oral <User Schedule>  heparin   Injectable 5000 Unit(s) SubCutaneous every 12 hours  ibuprofen  Tablet. 600 milliGRAM(s) Oral every 6 hours  levothyroxine 50 MICROGram(s) Oral daily  NIFEdipine XL 30 milliGRAM(s) Oral daily  oxytocin Infusion 333.333 milliUNIT(s)/Min (1000 mL/Hr) IV Continuous <Continuous>    MEDICATIONS  (PRN):  oxyCODONE    IR 5 milliGRAM(s) Oral once PRN Moderate to Severe Pain (4-10)      OBJECTIVE:    Sedation:        	[X] Alert	[ ] Drowsy	[ ] Arousable      [ ] Asleep       [ ] Unresponsive    Side Effects:	[X] None	[ ] Nausea	[ ] Vomiting         [ ] Pruritus  		[ ] Weakness            [ ] Numbness	          [ ] Other:    Vital Signs Last 24 Hrs  T(C): 36.9 (29 Jul 2023 16:00), Max: 37.1 (29 Jul 2023 13:00)  T(F): 98.4 (29 Jul 2023 16:00), Max: 98.7 (29 Jul 2023 13:00)  HR: 80 (29 Jul 2023 16:00) (73 - 88)  BP: 122/73 (29 Jul 2023 16:00) (96/59 - 126/72)  BP(mean): 76 (29 Jul 2023 06:20) (76 - 90)  RR: 18 (29 Jul 2023 16:00) (18 - 18)  SpO2: 98% (29 Jul 2023 16:00) (96% - 98%)    Parameters below as of 29 Jul 2023 16:00  Patient On (Oxygen Delivery Method): room air        ASSESSMENT/ PLAN  [X] Patient transitioned to prn analgesics  [X] Pain management per primary service, pain service to sign off   [X]Documentation and Verification of current medications

## 2023-07-29 NOTE — PROGRESS NOTE ADULT - SUBJECTIVE AND OBJECTIVE BOX
OB Postpartum Note:  Delivery    S: 43yo now POD #1 s/p LTCS. Her pain is well controlled. She is tolerating a regular diet but has not yet passed flatus. Denies N/V. Denies CP/SOB/lightheadedness/dizziness.     O:   Vitals:  Vital Signs Last 24 Hrs  T(C): 36.9 (2023 02:08), Max: 37.4 (2023 16:59)  T(F): 98.5 (2023 02:08), Max: 99.3 (2023 16:59)  HR: 82 (2023 02:08) (60 - 90)  BP: 106/62 (2023 02:08) (106/62 - 177/90)  BP(mean): 77 (2023 02:08) (77 - 118)  RR: 18 (2023 02:08) (12 - 38)  SpO2: 96% (2023 02:08) (96% - 100%)    Parameters below as of 2023 02:08  Patient On (Oxygen Delivery Method): room air        MEDICATIONS  (STANDING):  acetaminophen     Tablet .. 975 milliGRAM(s) Oral <User Schedule>  ibuprofen  Tablet. 600 milliGRAM(s) Oral every 6 hours  ketorolac   Injectable 30 milliGRAM(s) IV Push every 6 hours  morphine PF Spinal 0.1 milliGRAM(s) IntraThecal. once  NIFEdipine XL 30 milliGRAM(s) Oral daily  oxytocin Infusion 333.333 milliUNIT(s)/Min (1000 mL/Hr) IV Continuous <Continuous>    MEDICATIONS  (PRN):  dexAMETHasone  Injectable 4 milliGRAM(s) IV Push every 6 hours PRN Nausea  nalbuphine Injectable 2.5 milliGRAM(s) IV Push every 6 hours PRN Pruritus  naloxone Injectable 0.1 milliGRAM(s) IV Push every 3 minutes PRN For ANY of the following changes in patient status:  A. Breaths Per Minute LESS THAN 10, B. Oxygen saturation LESS THAN 90%, C. Sedation score of 6 for Stop After: 4 Times  ondansetron Injectable 4 milliGRAM(s) IV Push every 6 hours PRN Nausea  oxyCODONE    IR 5 milliGRAM(s) Oral once PRN Moderate to Severe Pain (4-10)  oxyCODONE    IR 5 milliGRAM(s) Oral every 3 hours PRN Mild Pain (1 - 3)  oxyCODONE    IR 10 milliGRAM(s) Oral every 3 hours PRN Moderate Pain (4 - 6)      Labs:  Blood type: O Positive  Rubella IgG: RPR: Negative                          12.4   7.51 >-----------< 247    (  @ 06:38 )             37.6    23 @ 06:38      139  |  104  |  11  ----------------------------<  85  3.7   |  20<L>  |  0.68        Ca    9.2      2023 06:38  Mg     5.9<H>         TPro  7.3  /  Alb  3.8  /  TBili  0.3  /  DBili  x   /  AST  26  /  ALT  29  /  AlkPhos  199<H>  23 @ 06:38          PE:  General: NAD, patient resting comfortably in bed  Abdomen: Mildly distended, appropriately tender  Incision: Clean, dry, intact.  Extremities: SCDs in place, no erythema

## 2023-07-29 NOTE — PROGRESS NOTE ADULT - SUBJECTIVE AND OBJECTIVE BOX
Postpartum Note,  Section  She is a  42y woman who is now post-operative day: 1    Subjective:  The patient feels well.  She is ambulating.   She is tolerating regular diet.  She denies nausea and vomiting.  She is voiding.  Her pain is controlled.  She reports normal postpartum bleeding.  She is breastfeeding.  She is formula feeding.    Physical exam:    Vital Signs Last 24 Hrs  T(C): 36.7 (2023 08:21), Max: 37.4 (2023 16:59)  T(F): 98.1 (2023 08:21), Max: 99.3 (2023 16:59)  HR: 83 (2023 08:21) (62 - 90)  BP: 111/68 (2023 08:21) (96/59 - 161/85)  BP(mean): 76 (2023 06:20) (76 - 115)  RR: 18 (2023 08:21) (12 - 38)  SpO2: 96% (2023 08:21) (96% - 100%)    Parameters below as of 2023 08:21  Patient On (Oxygen Delivery Method): room air        Gen: NAD  Breast: Soft, nontender, not engorged.  Abdomen: Soft, nontender, no distension , firm uterine fundus at umbilicus.  Incision: Clean, dry, and intact with steri strips  Pelvic: Normal lochia noted  Ext: No calf tenderness    LABS:                        10.0   11.23 )-----------( 208      ( 2023 05:30 )             30.1       Rubella status:     Allergies    No Known Allergies        MEDICATIONS  (STANDING):  acetaminophen     Tablet .. 975 milliGRAM(s) Oral <User Schedule>  heparin   Injectable 5000 Unit(s) SubCutaneous every 12 hours  ibuprofen  Tablet. 600 milliGRAM(s) Oral every 6 hours  ketorolac   Injectable 30 milliGRAM(s) IV Push every 6 hours  levothyroxine 50 MICROGram(s) Oral daily  NIFEdipine XL 30 milliGRAM(s) Oral daily  oxytocin Infusion 333.333 milliUNIT(s)/Min (1000 mL/Hr) IV Continuous <Continuous>  potassium chloride    Tablet ER 20 milliEquivalent(s) Oral every 2 hours    MEDICATIONS  (PRN):  oxyCODONE    IR 5 milliGRAM(s) Oral once PRN Moderate to Severe Pain (4-10)        Assessment and Plan  POD #1 s/p  section, s/p PIH stable, bps in control and no sxs, mg off  K low and being repleted  Doing well.  Encourage ambulation.

## 2023-07-30 RX ORDER — NIFEDIPINE 30 MG
60 TABLET, EXTENDED RELEASE 24 HR ORAL DAILY
Refills: 0 | Status: DISCONTINUED | OUTPATIENT
Start: 2023-07-31 | End: 2023-07-31

## 2023-07-30 RX ORDER — NIFEDIPINE 30 MG
30 TABLET, EXTENDED RELEASE 24 HR ORAL ONCE
Refills: 0 | Status: COMPLETED | OUTPATIENT
Start: 2023-07-30 | End: 2023-07-30

## 2023-07-30 RX ADMIN — HEPARIN SODIUM 5000 UNIT(S): 5000 INJECTION INTRAVENOUS; SUBCUTANEOUS at 17:38

## 2023-07-30 RX ADMIN — HEPARIN SODIUM 5000 UNIT(S): 5000 INJECTION INTRAVENOUS; SUBCUTANEOUS at 05:22

## 2023-07-30 RX ADMIN — Medication 600 MILLIGRAM(S): at 18:08

## 2023-07-30 RX ADMIN — Medication 975 MILLIGRAM(S): at 21:31

## 2023-07-30 RX ADMIN — Medication 975 MILLIGRAM(S): at 03:25

## 2023-07-30 RX ADMIN — Medication 975 MILLIGRAM(S): at 09:06

## 2023-07-30 RX ADMIN — Medication 600 MILLIGRAM(S): at 00:30

## 2023-07-30 RX ADMIN — Medication 30 MILLIGRAM(S): at 18:51

## 2023-07-30 RX ADMIN — Medication 600 MILLIGRAM(S): at 17:38

## 2023-07-30 RX ADMIN — Medication 50 MICROGRAM(S): at 05:22

## 2023-07-30 RX ADMIN — Medication 975 MILLIGRAM(S): at 15:12

## 2023-07-30 RX ADMIN — Medication 975 MILLIGRAM(S): at 15:42

## 2023-07-30 RX ADMIN — Medication 975 MILLIGRAM(S): at 02:52

## 2023-07-30 RX ADMIN — Medication 975 MILLIGRAM(S): at 08:36

## 2023-07-30 RX ADMIN — Medication 600 MILLIGRAM(S): at 11:07

## 2023-07-30 RX ADMIN — Medication 600 MILLIGRAM(S): at 11:37

## 2023-07-30 RX ADMIN — Medication 975 MILLIGRAM(S): at 21:01

## 2023-07-30 RX ADMIN — Medication 30 MILLIGRAM(S): at 05:33

## 2023-07-30 NOTE — PROGRESS NOTE ADULT - ATTENDING COMMENTS
I have seen and examined this patient.  I agree with the above assessment and plan.  She is stable at this time  Will continue to monitor in house until tomorrow due to preeclampsia.     Hedy Tolentino MD

## 2023-07-30 NOTE — PROGRESS NOTE ADULT - SUBJECTIVE AND OBJECTIVE BOX
Patient seen and examined at bedside, no acute overnight events. Patient ambulating at bedside changing baby upon eval. No acute complaints, pain well controlled. Patient is ambulating and tolerating regular diet. Passing flatus, voiding spontaneously. Denies CP, SOB, N/V, HA, blurred vision, epigastric pain.    Vital Signs Last 24 Hours  T(C): 37 (07-30-23 @ 00:16), Max: 37.3 (07-29-23 @ 20:25)  HR: 90 (07-30-23 @ 00:16) (80 - 90)  BP: 132/79 (07-30-23 @ 00:16) (105/62 - 132/79)  RR: 18 (07-30-23 @ 00:16) (18 - 18)  SpO2: 97% (07-30-23 @ 00:16) (96% - 98%)    I&O's Summary  28 Jul 2023 07:01  -  29 Jul 2023 07:00  --------------------------------------------------------  IN: 4010 mL / OUT: 3890 mL / NET: 120 mL    29 Jul 2023 07:01  -  30 Jul 2023 04:48  --------------------------------------------------------  IN: 208 mL / OUT: 1800 mL / NET: -1592 mL    Physical Exam:  General: NAD  Abdomen: Soft, non-tender, non-distended, fundus firm  Incision: Pfannenstiel incision CDI w/ Dermabond enicole, subcuticular suture closure  Pelvic: Lochia wnl, external exam of perineum clean and dry without swelling    Labs:  Blood Type: O Positive  Antibody Screen: Negative  RPR: Negative               10.0   11.23 )-----------( 208      ( 07-29 @ 05:30 )             30.1                12.4   7.51  )-----------( 247      ( 07-28 @ 06:38 )             37.6                12.9   7.05  )-----------( 264      ( 07-24 @ 08:48 )             39.6     MEDICATIONS  (STANDING):  acetaminophen     Tablet .. 975 milliGRAM(s) Oral <User Schedule>  heparin   Injectable 5000 Unit(s) SubCutaneous every 12 hours  ibuprofen  Tablet. 600 milliGRAM(s) Oral every 6 hours  levothyroxine 50 MICROGram(s) Oral daily  NIFEdipine XL 30 milliGRAM(s) Oral daily  oxytocin Infusion 333.333 milliUNIT(s)/Min (1000 mL/Hr) IV Continuous <Continuous>    MEDICATIONS  (PRN):  oxyCODONE    IR 5 milliGRAM(s) Oral once PRN Moderate to Severe Pain (4-10)

## 2023-07-30 NOTE — CHART NOTE - NSCHARTNOTEFT_GEN_A_CORE
Patient seen for: nutrition consult for GDM postpartum education prior to discharge    Source: patient, EMR    Patient is a 42 year old female   Admission date: 7/28/2023  Antepartum course significant for GDM.  Pt plans on breastfeeding infant  POD #2 s/p rLTCS    Chart reviewed, events noted. Meds/Labs reviewed.    Anthropometrics:  Height: 60 inches  Pre-pregnancy weight: 107 pounds   Weight gain: 27 pounds   Admit/Dosing wt: 134.9 pounds     Nutrition Diagnosis:   Food and Nutrition Related Knowledge Deficit related to knowledge of post-partum GDM nutrition recommendations as evidenced by Pt with GDM.       Goal: Pt able to teach back 2 points of nutrition education provided.     Nutrition Intervention:  Post-partum GDM diet education provided to patient and  at bedside:   1) Increased risk of developing T2DM with GDM and ways to help prevent development  2) 4-12 week fasting oral glucose tolerance test follow-up as outpatient  3) General healthful diet and physical activity recommendations discussed  4) Increased energy needs with breastfeeding    After-delivery GDM education handout provided.      Monitoring:  No other nutrition risks were identified during this encounter.  RD remains available upon request and will follow up per protocol.    Mayra Larson RD CDN pager # [phone] or TEAMS

## 2023-07-30 NOTE — PROGRESS NOTE ADULT - ASSESSMENT
43yo POD #2 s/p rLTCS in pregnancy c/b severe preeclampsia. Patient is stable and doing well post-operatively.    #sPEC  - s/p Mg for seizure prophylaxis  - BPs overnight 120s-130s/70s  - c/w Procardia 30XL  - HELLP wnl    #Routine Post-Op Care  - Continue regular diet  - Increase ambulation  - PO pain medication with Tylenol, Motrin and Oxycodone PRN for pain control  - DVT prophylaxis with Heparin 5000u BID    Ivanna Tarango  PGY3 41yo POD #2 s/p rLTCS in pregnancy c/b severe preeclampsia. Patient is stable and doing well post-operatively.    #sPEC  - s/p Mg for seizure prophylaxis  - BPs overnight 120s-130s/70s  - c/w Procardia 30XL  - HELLP wnl    #Routine Post-Op Care  - Continue regular diet  - Increase ambulation  - PO pain medication with Tylenol, Motrin and Oxycodone PRN for pain control  - DVT prophylaxis with Heparin 5000u BID    Ivanna Tarango  PGY3

## 2023-07-31 ENCOUNTER — APPOINTMENT (OUTPATIENT)
Dept: OBGYN | Facility: CLINIC | Age: 43
End: 2023-07-31

## 2023-07-31 ENCOUNTER — TRANSCRIPTION ENCOUNTER (OUTPATIENT)
Age: 43
End: 2023-07-31

## 2023-07-31 VITALS
DIASTOLIC BLOOD PRESSURE: 79 MMHG | SYSTOLIC BLOOD PRESSURE: 147 MMHG | OXYGEN SATURATION: 97 % | RESPIRATION RATE: 18 BRPM | HEART RATE: 85 BPM | TEMPERATURE: 99 F

## 2023-07-31 PROCEDURE — 86901 BLOOD TYPING SEROLOGIC RH(D): CPT

## 2023-07-31 PROCEDURE — 83615 LACTATE (LD) (LDH) ENZYME: CPT

## 2023-07-31 PROCEDURE — 85610 PROTHROMBIN TIME: CPT

## 2023-07-31 PROCEDURE — 59025 FETAL NON-STRESS TEST: CPT

## 2023-07-31 PROCEDURE — 86850 RBC ANTIBODY SCREEN: CPT

## 2023-07-31 PROCEDURE — 59050 FETAL MONITOR W/REPORT: CPT

## 2023-07-31 PROCEDURE — 85384 FIBRINOGEN ACTIVITY: CPT

## 2023-07-31 PROCEDURE — 85025 COMPLETE CBC W/AUTO DIFF WBC: CPT

## 2023-07-31 PROCEDURE — 84550 ASSAY OF BLOOD/URIC ACID: CPT

## 2023-07-31 PROCEDURE — 83735 ASSAY OF MAGNESIUM: CPT

## 2023-07-31 PROCEDURE — 86769 SARS-COV-2 COVID-19 ANTIBODY: CPT

## 2023-07-31 PROCEDURE — 80053 COMPREHEN METABOLIC PANEL: CPT

## 2023-07-31 PROCEDURE — 82962 GLUCOSE BLOOD TEST: CPT

## 2023-07-31 PROCEDURE — 86780 TREPONEMA PALLIDUM: CPT

## 2023-07-31 PROCEDURE — 85730 THROMBOPLASTIN TIME PARTIAL: CPT

## 2023-07-31 PROCEDURE — 86900 BLOOD TYPING SEROLOGIC ABO: CPT

## 2023-07-31 RX ORDER — IBUPROFEN 200 MG
1 TABLET ORAL
Qty: 0 | Refills: 0 | DISCHARGE
Start: 2023-07-31

## 2023-07-31 RX ORDER — ACETAMINOPHEN 500 MG
3 TABLET ORAL
Qty: 0 | Refills: 0 | DISCHARGE
Start: 2023-07-31

## 2023-07-31 RX ORDER — NIFEDIPINE 30 MG
2 TABLET, EXTENDED RELEASE 24 HR ORAL
Qty: 60 | Refills: 1
Start: 2023-07-31 | End: 2023-09-28

## 2023-07-31 RX ORDER — LEVOTHYROXINE SODIUM 125 MCG
1 TABLET ORAL
Refills: 0 | DISCHARGE

## 2023-07-31 RX ADMIN — Medication 975 MILLIGRAM(S): at 02:50

## 2023-07-31 RX ADMIN — Medication 600 MILLIGRAM(S): at 17:37

## 2023-07-31 RX ADMIN — HEPARIN SODIUM 5000 UNIT(S): 5000 INJECTION INTRAVENOUS; SUBCUTANEOUS at 17:37

## 2023-07-31 RX ADMIN — Medication 600 MILLIGRAM(S): at 12:13

## 2023-07-31 RX ADMIN — Medication 50 MICROGRAM(S): at 05:42

## 2023-07-31 RX ADMIN — Medication 600 MILLIGRAM(S): at 00:16

## 2023-07-31 RX ADMIN — Medication 60 MILLIGRAM(S): at 05:42

## 2023-07-31 RX ADMIN — Medication 600 MILLIGRAM(S): at 05:42

## 2023-07-31 RX ADMIN — Medication 975 MILLIGRAM(S): at 03:20

## 2023-07-31 RX ADMIN — Medication 975 MILLIGRAM(S): at 15:48

## 2023-07-31 RX ADMIN — Medication 600 MILLIGRAM(S): at 06:12

## 2023-07-31 RX ADMIN — HEPARIN SODIUM 5000 UNIT(S): 5000 INJECTION INTRAVENOUS; SUBCUTANEOUS at 05:41

## 2023-07-31 RX ADMIN — Medication 975 MILLIGRAM(S): at 08:45

## 2023-07-31 RX ADMIN — Medication 600 MILLIGRAM(S): at 18:38

## 2023-07-31 RX ADMIN — Medication 975 MILLIGRAM(S): at 09:45

## 2023-07-31 RX ADMIN — Medication 600 MILLIGRAM(S): at 13:15

## 2023-07-31 RX ADMIN — Medication 600 MILLIGRAM(S): at 00:46

## 2023-07-31 RX ADMIN — Medication 975 MILLIGRAM(S): at 14:42

## 2023-07-31 NOTE — DISCHARGE NOTE OB - INSTRUCTIONS
Follow up with MD as directed. Call if any increased pain, fever, chills, pain on urination, headache, dizziness, blurry vision. Continue to monitor BP at home 2x a day and call MD if /90's. Follow up on Thursday or Friday for a BP check in office.

## 2023-07-31 NOTE — PROGRESS NOTE ADULT - SUBJECTIVE AND OBJECTIVE BOX
OB Postpartum Note:  Delivery    S: 41yo now POD #3 s/p LTCS. Her pain is well controlled. She is tolerating a regular diet and passing flatus. Voiding spontaneously and ambulating without difficulty. Denies N/V. Denies CP/SOB/lightheadedness/dizziness.     O:   Vitals:  Vital Signs Last 24 Hrs  T(C): 36.9 (2023 00:05), Max: 37.1 (2023 16:58)  T(F): 98.4 (2023 00:05), Max: 98.7 (2023 16:58)  HR: 87 (2023 00:05) (69 - 91)  BP: 128/71 (2023 00:05) (128/71 - 158/83)  BP(mean): --  RR: 18 (2023 00:05) (18 - 18)  SpO2: 98% (2023 00:05) (96% - 99%)    Parameters below as of 2023 00:05  Patient On (Oxygen Delivery Method): room air        MEDICATIONS  (STANDING):  acetaminophen     Tablet .. 975 milliGRAM(s) Oral <User Schedule>  heparin   Injectable 5000 Unit(s) SubCutaneous every 12 hours  ibuprofen  Tablet. 600 milliGRAM(s) Oral every 6 hours  levothyroxine 50 MICROGram(s) Oral daily  NIFEdipine XL 60 milliGRAM(s) Oral daily  oxytocin Infusion 333.333 milliUNIT(s)/Min (1000 mL/Hr) IV Continuous <Continuous>    MEDICATIONS  (PRN):  oxyCODONE    IR 5 milliGRAM(s) Oral once PRN Moderate to Severe Pain (4-10)      Labs:  Blood type: O Positive  Rubella IgG: RPR: Negative                          10.0<L>   11.23<H> >-----------< 208    (  @ 05:30 )             30.1<L>                        12.4   7.51 >-----------< 247    (  @ 06:38 )             37.6    - @ 05:31      136  |  101  |  7   ----------------------------<  111<H>  3.3<L>   |  24  |  0.76    23 @ 06:38      139  |  104  |  11  ----------------------------<  85  3.7   |  20<L>  |  0.68        Ca    6.3<LL>      2023 05:31  Ca    9.2      2023 06:38  Mg     6.5<H>       Mg     5.9<H>         TPro  5.3<L>  /  Alb  2.6<L>  /  TBili  0.1<L>  /  DBili  x   /  AST  34  /  ALT  30  /  AlkPhos  136<H>  23 @ 05:31  TPro  7.3  /  Alb  3.8  /  TBili  0.3  /  DBili  x   /  AST  26  /  ALT  29  /  AlkPhos  199<H>  23 @ 06:38          PE:  General: NAD, patient resting comfortably in bed  Abdomen: Mildly distended, appropriately tender. No rebound tenderness or guarding.   Incision: Clean, dry, intact.   Extremities: SCDs in place, no erythema.    A/P: 41yo POD #3 s/p LTCS.  Patient is stable and doing well post-operatively.    - Continue regular diet.  - Increase ambulation as tolerated.  - Continue standing Ibuprofen and Acetaminophen for pain. Oxycodone available PRN for breakthrough pain.    - POD #3 CBC this morning.   - DVT prophylaxis with Heparin 5000u BID    Amyeo Jereen PGY-1 OB Postpartum Note:  Delivery    S: 41yo now POD #3 s/p LTCS. Her pain is well controlled. She is tolerating a regular diet and passing flatus. Voiding spontaneously and ambulating without difficulty. Denies N/V. Denies CP/SOB/lightheadedness/dizziness.     O:   Vitals:  Vital Signs Last 24 Hrs  T(C): 36.9 (2023 00:05), Max: 37.1 (2023 16:58)  T(F): 98.4 (2023 00:05), Max: 98.7 (2023 16:58)  HR: 87 (2023 00:05) (69 - 91)  BP: 128/71 (2023 00:05) (128/71 - 158/83)  BP(mean): --  RR: 18 (2023 00:05) (18 - 18)  SpO2: 98% (2023 00:05) (96% - 99%)    Parameters below as of 2023 00:05  Patient On (Oxygen Delivery Method): room air        MEDICATIONS  (STANDING):  acetaminophen     Tablet .. 975 milliGRAM(s) Oral <User Schedule>  heparin   Injectable 5000 Unit(s) SubCutaneous every 12 hours  ibuprofen  Tablet. 600 milliGRAM(s) Oral every 6 hours  levothyroxine 50 MICROGram(s) Oral daily  NIFEdipine XL 60 milliGRAM(s) Oral daily  oxytocin Infusion 333.333 milliUNIT(s)/Min (1000 mL/Hr) IV Continuous <Continuous>    MEDICATIONS  (PRN):  oxyCODONE    IR 5 milliGRAM(s) Oral once PRN Moderate to Severe Pain (4-10)      Labs:  Blood type: O Positive  Rubella IgG: RPR: Negative                          10.0<L>   11.23<H> >-----------< 208    (  @ 05:30 )             30.1<L>                        12.4   7.51 >-----------< 247    (  @ 06:38 )             37.6    - @ 05:31      136  |  101  |  7   ----------------------------<  111<H>  3.3<L>   |  24  |  0.76    23 @ 06:38      139  |  104  |  11  ----------------------------<  85  3.7   |  20<L>  |  0.68        Ca    6.3<LL>      2023 05:31  Ca    9.2      2023 06:38  Mg     6.5<H>       Mg     5.9<H>         TPro  5.3<L>  /  Alb  2.6<L>  /  TBili  0.1<L>  /  DBili  x   /  AST  34  /  ALT  30  /  AlkPhos  136<H>  23 @ 05:31  TPro  7.3  /  Alb  3.8  /  TBili  0.3  /  DBili  x   /  AST  26  /  ALT  29  /  AlkPhos  199<H>  23 @ 06:38          PE:  General: NAD, patient resting comfortably in bed  Abdomen: Mildly distended, appropriately tender. No rebound tenderness or guarding.   Incision: Clean, dry, intact.   Extremities: SCDs in place, no erythema.

## 2023-07-31 NOTE — DISCHARGE NOTE OB - CARE PLAN
1 Principal Discharge DX:	S/P repeat low transverse   Assessment and plan of treatment:	followup in 2 weeks  Secondary Diagnosis:	Severe preeclampsia, third trimester  Assessment and plan of treatment:	continue procardia. and close bp monitoring.   Principal Discharge DX:	S/P repeat low transverse   Assessment and plan of treatment:	followup in 2 weeks Follow up on Thursday or Friday for a BP check  Secondary Diagnosis:	Severe preeclampsia, third trimester  Assessment and plan of treatment:	continue procardia. and close bp monitoring.

## 2023-07-31 NOTE — DISCHARGE NOTE OB - NS MD DC FALL RISK RISK
For information on Fall & Injury Prevention, visit: [email or web address] OR  [email or web address] OR  https://www.cdc.gov/steadi/patient.html

## 2023-07-31 NOTE — DISCHARGE NOTE OB - PLAN OF CARE
followup in 2 weeks continue procardia. and close bp monitoring. followup in 2 weeks Follow up on Thursday or Friday for a BP check

## 2023-07-31 NOTE — PROGRESS NOTE ADULT - ATTENDING COMMENTS
Pt seen and examined, POD3 doing well.   1. POD 3 - routine care  2. spec - procardia was increased to 60xl last night. continue close bp monitoring.  d/c planning.

## 2023-07-31 NOTE — PROGRESS NOTE ADULT - ASSESSMENT
41yo POD #3 s/p rLTCS in pregnancy c/b severe preeclampsia. Patient is stable and doing well post-operatively.    #sPEC  - s/p Mg for seizure prophylaxis, HELLP wnl  - Up-titrated to Procardia 60XL last night  - BPs overnight 120s-140s/70s  - Will continue to monitor BPs      #Routine Post-Op Care  - Continue regular diet  - Increase ambulation  - PO pain medication with Tylenol, Motrin and Oxycodone PRN for pain control  - DVT prophylaxis with Heparin 5000u BID    Amyeo Afroz Jereen, PGY-3

## 2023-07-31 NOTE — DISCHARGE NOTE OB - PATIENT PORTAL LINK FT
You can access the FollowMyHealth Patient Portal offered by [facility] by registering at the following website: [email or web address]. By joining [facility] FollowMyHealth portal, you will also be able to view your health information using other applications (apps) compatible with our system.

## 2023-07-31 NOTE — DISCHARGE NOTE OB - MEDICATION SUMMARY - MEDICATIONS TO TAKE
I will START or STAY ON the medications listed below when I get home from the hospital:    ibuprofen 600 mg oral tablet  -- 1 tab(s) by mouth every 6 hours  -- Indication: For moderate pain    acetaminophen 325 mg oral tablet  -- 3 tab(s) by mouth every 6 hours as needed for  mild pain  -- Indication: For mild pain    NIFEdipine (Eqv-Procardia XL) 30 mg oral tablet, extended release  -- 2 tab(s) by mouth once a day  -- Indication: For severe preeclampsia

## 2023-07-31 NOTE — DISCHARGE NOTE OB - CARE PROVIDER_API CALL
Raina Watkins  Obstetrics and Gynecology  [address], [address]  [place] [address]  Phone: [phone]  Fax: [phone]  Follow Up Time:

## 2023-08-07 ENCOUNTER — APPOINTMENT (OUTPATIENT)
Dept: OBGYN | Facility: CLINIC | Age: 43
End: 2023-08-07

## 2023-08-11 ENCOUNTER — APPOINTMENT (OUTPATIENT)
Dept: OBGYN | Facility: CLINIC | Age: 43
End: 2023-08-11
Payer: COMMERCIAL

## 2023-08-11 ENCOUNTER — APPOINTMENT (OUTPATIENT)
Dept: OBGYN | Facility: CLINIC | Age: 43
End: 2023-08-11

## 2023-08-11 VITALS — SYSTOLIC BLOOD PRESSURE: 142 MMHG | DIASTOLIC BLOOD PRESSURE: 72 MMHG

## 2023-08-11 PROCEDURE — 0503F POSTPARTUM CARE VISIT: CPT

## 2023-08-11 NOTE — PLAN
[FreeTextEntry1] : 2 Wk Incision Check: C/s incision is clean, dry, and intact Advised pt to call if she experiences severe pain, fever, heavy VB, severe mood changes, or HAs that don't resolve with rest, hydration, or Tylenol  Repeat BPs today: 145/72 142/72 Pt to c/w Procardia 60 xL She has cardio f/u 09/2023 If BPs are <110/70s, pt to d/c Procardia.  GDM: Will give Rx for 2 hour glucose test at nest visit.   RTO for 6 wk pp visit

## 2023-08-11 NOTE — PHYSICAL EXAM
[Appropriately responsive] : appropriately responsive [Alert] : alert [No Acute Distress] : no acute distress [Soft] : soft [Non-tender] : non-tender [Non-distended] : non-distended [No HSM] : No HSM [No Lesions] : no lesions [No Mass] : no mass [Oriented x3] : oriented x3 [FreeTextEntry7] : c/s incision is clean, dry, and intact

## 2023-08-11 NOTE — SIGNATURES
[TextEntry] : This note was written by Ashish Ferro on 08/11/2023 actively solely BREANN Orlando M.D.  All medical record entries made by the Scribe were at my, BREANN Orlando M.D. direction and personally dictated by me on 08/11/2023. I have personally reviewed the chart and agree that the record reflects my personal performance of the history, physical exam, assessment, and plan.

## 2023-08-11 NOTE — HISTORY OF PRESENT ILLNESS
[FreeTextEntry1] : 2023. NAGIE BRUNER 42 year old female presents for 2 wk incision check.  Pt is s/p repeat  on 2023 (baby Jessa) 6 lbs, delivere at 37 weeks for CHTN. , complicated by pp  severe preeclampsia now well controlled on Procardia 60. She reports at home BPs are normal, 120s/80s. She is doing well. Normal moods. She reports some abdominal soreness along the incision line, which is normal to feel. Denies fever or chills. No urinary complaints. Denies vaginal discharge or vaginitis sxs. No HAs or visual changes.

## 2023-08-18 RX ORDER — PRENATAL VIT NO.126/IRON/FOLIC 28MG-0.8MG
28-0.8 TABLET ORAL DAILY
Qty: 90 | Refills: 0 | Status: ACTIVE | COMMUNITY
Start: 2020-01-29 | End: 1900-01-01

## 2023-08-22 RX ORDER — PRENATAL VIT NO.130/IRON/FOLIC 27MG-0.8MG
27-0.8 TABLET ORAL DAILY
Qty: 90 | Refills: 3 | Status: ACTIVE | COMMUNITY
Start: 2023-08-22 | End: 1900-01-01

## 2023-09-15 NOTE — PRE-ANESTHESIA EVALUATION ADULT - LAST ECHOCARDIOGRAM
2021 "normal, EF > 65%" Cosentyx Counseling:  I discussed with the patient the risks of Cosentyx including but not limited to worsening of Crohn's disease, immunosuppression, allergic reactions and infections.  The patient understands that monitoring is required including a PPD at baseline and must alert us or the primary physician if symptoms of infection or other concerning signs are noted.

## 2023-09-19 ENCOUNTER — APPOINTMENT (OUTPATIENT)
Dept: OBGYN | Facility: CLINIC | Age: 43
End: 2023-09-19
Payer: COMMERCIAL

## 2023-09-19 VITALS — BODY MASS INDEX: 21.29 KG/M2 | DIASTOLIC BLOOD PRESSURE: 77 MMHG | WEIGHT: 109 LBS | SYSTOLIC BLOOD PRESSURE: 136 MMHG

## 2023-09-19 DIAGNOSIS — O14.93 UNSPECIFIED PRE-ECLAMPSIA, THIRD TRIMESTER: ICD-10-CM

## 2023-09-19 DIAGNOSIS — O24.419 GESTATIONAL DIABETES MELLITUS IN PREGNANCY, UNSPECIFIED CONTROL: ICD-10-CM

## 2023-09-19 PROCEDURE — 0503F POSTPARTUM CARE VISIT: CPT

## 2023-09-20 LAB — HPV HIGH+LOW RISK DNA PNL CVX: NOT DETECTED

## 2023-09-25 LAB — CYTOLOGY CVX/VAG DOC THIN PREP: NORMAL

## 2023-09-27 ENCOUNTER — NON-APPOINTMENT (OUTPATIENT)
Age: 43
End: 2023-09-27

## 2023-09-27 ENCOUNTER — APPOINTMENT (OUTPATIENT)
Dept: CARDIOLOGY | Facility: CLINIC | Age: 43
End: 2023-09-27
Payer: COMMERCIAL

## 2023-09-27 VITALS
WEIGHT: 109 LBS | BODY MASS INDEX: 21.4 KG/M2 | OXYGEN SATURATION: 99 % | DIASTOLIC BLOOD PRESSURE: 78 MMHG | SYSTOLIC BLOOD PRESSURE: 127 MMHG | HEART RATE: 88 BPM | HEIGHT: 60 IN

## 2023-09-27 PROCEDURE — 99214 OFFICE O/P EST MOD 30 MIN: CPT | Mod: 25

## 2023-09-27 PROCEDURE — 93000 ELECTROCARDIOGRAM COMPLETE: CPT

## 2023-09-29 ENCOUNTER — RX RENEWAL (OUTPATIENT)
Age: 43
End: 2023-09-29

## 2023-10-31 ENCOUNTER — APPOINTMENT (OUTPATIENT)
Dept: ENDOCRINOLOGY | Facility: CLINIC | Age: 43
End: 2023-10-31

## 2023-11-01 ENCOUNTER — APPOINTMENT (OUTPATIENT)
Dept: ENDOCRINOLOGY | Facility: CLINIC | Age: 43
End: 2023-11-01
Payer: COMMERCIAL

## 2023-11-01 DIAGNOSIS — D35.2 BENIGN NEOPLASM OF PITUITARY GLAND: ICD-10-CM

## 2023-11-01 DIAGNOSIS — E06.3 AUTOIMMUNE THYROIDITIS: ICD-10-CM

## 2023-11-01 PROCEDURE — 99441: CPT

## 2023-11-11 LAB
T4 FREE SERPL-MCNC: 1.5 NG/DL
TSH SERPL-ACNC: <0.01 UIU/ML

## 2023-12-11 ENCOUNTER — APPOINTMENT (OUTPATIENT)
Dept: CARDIOLOGY | Facility: CLINIC | Age: 43
End: 2023-12-11
Payer: COMMERCIAL

## 2023-12-11 VITALS
WEIGHT: 109 LBS | DIASTOLIC BLOOD PRESSURE: 88 MMHG | SYSTOLIC BLOOD PRESSURE: 134 MMHG | OXYGEN SATURATION: 100 % | HEART RATE: 98 BPM | BODY MASS INDEX: 21.29 KG/M2

## 2023-12-11 PROCEDURE — 99214 OFFICE O/P EST MOD 30 MIN: CPT | Mod: 25

## 2023-12-11 PROCEDURE — 93000 ELECTROCARDIOGRAM COMPLETE: CPT

## 2023-12-11 RX ORDER — NIFEDIPINE 30 MG/1
30 TABLET, FILM COATED, EXTENDED RELEASE ORAL DAILY
Qty: 180 | Refills: 1 | Status: DISCONTINUED | COMMUNITY
Start: 2023-09-19 | End: 2023-12-11

## 2023-12-17 NOTE — DISCUSSION/SUMMARY
[FreeTextEntry1] : 43 year old female, originally from [place] with history of hypothyroidism and  a recent emergency  at 34 weeks and 6 days for preeclampsia and hypertension. Treated with IV Magnesium X 24 hours.   She carries a strong family history of both maternal and paternal hypertension.  Patient presents via telehealth to [facility] for blood pressure management and cardiovascular screening and risk assessment. .   Her pregnancy history is as follows:  1st pregnancy-   termination  2nd pregnancy- 2020 early miscarriage: 5 weeks  3rd pregnancy- 2020 Delivered at 34 weeks and 6 days: Baby girl by  on 2021.  interval note 23 delivered 23 -   post delivery had post partum PEC - started on Mg drip - discharged home on procardia 60 BP at home in low 120s pt is breastfeeding asymptomatic - denies chest pain, SOB/ANDRADE, palpitations, lightheadedness or syncope    #Gestational Hypertension/PEC   Blood pressure is currently in good control on no medical agents   Continue with asa   Advised patient to hold contact us if she sees BP readings >140/90  #Hypothyroidism   Continue on Levothyroxine 50 mg daily  #Adverse Cardiovascular Risk Factors   ECG with no acute ischemic changes (stable from prior) will schedule for an echocardiogram to reassess heart function and rule out diastolic issues  Encouraged patient to continue healthy exercise and eating habits, focusing on a Mediterranean style of eating and aiming for the recommended 150 minutes per week of moderate physical activity.  Encouraged the patient to find healthy outlets and coping mechanisms to help manage stress, such as physical activity/exercise, reducing workload if possible, spending time with family and friends, engaging in an enjoyable hobby, or using meditation or mindfulness techniques.

## 2023-12-17 NOTE — HISTORY OF PRESENT ILLNESS
[FreeTextEntry1] : 43 year old female, originally from [facility] with history of hypothyroidism and  a recent emergency  at 34 weeks and 6 days for preeclampsia and hypertension. Treated with IV Magnesium X 24 hours.   She carries a strong family history of both maternal and paternal hypertension.  Patient presents via telehealth to the [facility] for blood pressure management and cardiovascular screening and risk assessment. .   Her pregnancy history is as follows:  1st pregnancy-   termination  2nd pregnancy- 2020 early miscarriage: 5 weeks  3rd pregnancy- 2020 Delivered at 34 weeks and 6 days: Baby girl by  on 2021.  interval note 23 delivered 23 -   post delivery had post partum PEC - started on Mg drip - discharged home on procardia 60 BP at home in low 120s pt is breastfeeding asymptomatic - denies chest pain, SOB/ANDRADE, palpitations, lightheadedness or syncope

## 2023-12-27 ENCOUNTER — NON-APPOINTMENT (OUTPATIENT)
Age: 43
End: 2023-12-27

## 2024-01-03 LAB
T4 FREE SERPL-MCNC: 0.9 NG/DL
TSH SERPL-ACNC: 2.26 UIU/ML

## 2024-01-06 NOTE — OB PROVIDER H&P - ASSESSMENT
Detail Level: Generalized Detail Level: Detailed Topical Clindamycin Pregnancy And Lactation Text: This medication is Pregnancy Category B and is considered safe during pregnancy. It is unknown if it is excreted in breast milk. Winlevi Pregnancy And Lactation Text: This medication is considered safe during pregnancy and breastfeeding. Azithromycin Counseling:  I discussed with the patient the risks of azithromycin including but not limited to GI upset, allergic reaction, drug rash, diarrhea, and yeast infections. Topical Sulfur Applications Pregnancy And Lactation Text: This medication is Pregnancy Category C and has an unknown safety profile during pregnancy. It is unknown if this topical medication is excreted in breast milk. Dapsone Counseling: I discussed with the patient the risks of dapsone including but not limited to hemolytic anemia, agranulocytosis, rashes, methemoglobinemia, kidney failure, peripheral neuropathy, headaches, GI upset, and liver toxicity.  Patients who start dapsone require monitoring including baseline LFTs and weekly CBCs for the first month, then every month thereafter.  The patient verbalized understanding of the proper use and possible adverse effects of dapsone.  All of the patient's questions and concerns were addressed. Birth Control Pills Counseling: Birth Control Pill Counseling: I discussed with the patient the potential side effects of OCPs including but not limited to increased risk of stroke, heart attack, thrombophlebitis, deep venous thrombosis, hepatic adenomas, breast changes, GI upset, headaches, and depression.  The patient verbalized understanding of the proper use and possible adverse effects of OCPs. All of the patient's questions and concerns were addressed. Bactrim Counseling:  I discussed with the patient the risks of sulfa antibiotics including but not limited to GI upset, allergic reaction, drug rash, diarrhea, dizziness, photosensitivity, and yeast infections.  Rarely, more serious reactions can occur including but not limited to aplastic anemia, agranulocytosis, methemoglobinemia, blood dyscrasias, liver or kidney failure, lung infiltrates or desquamative/blistering drug rashes. Detail Level: Zone Spironolactone Counseling: Patient advised regarding risks of diarrhea, abdominal pain, hyperkalemia, birth defects (for female patients), liver toxicity and renal toxicity. The patient may need blood work to monitor liver and kidney function and potassium levels while on therapy. The patient verbalized understanding of the proper use and possible adverse effects of spironolactone.  All of the patient's questions and concerns were addressed. Tetracycline Counseling: Patient counseled regarding possible photosensitivity and increased risk for sunburn.  Patient instructed to avoid sunlight, if possible.  When exposed to sunlight, patients should wear protective clothing, sunglasses, and sunscreen.  The patient was instructed to call the office immediately if the following severe adverse effects occur:  hearing changes, easy bruising/bleeding, severe headache, or vision changes.  The patient verbalized understanding of the proper use and possible adverse effects of tetracycline.  All of the patient's questions and concerns were addressed. Patient understands to avoid pregnancy while on therapy due to potential birth defects. Azelaic Acid Counseling: Patient counseled that medicine may cause skin irritation and to avoid applying near the eyes.  In the event of skin irritation, the patient was advised to reduce the amount of the drug applied or use it less frequently.   The patient verbalized understanding of the proper use and possible adverse effects of azelaic acid.  All of the patient's questions and concerns were addressed. Aklief counseling:  Patient advised to apply a pea-sized amount only at bedtime and wait 30 minutes after washing their face before applying.  If too drying, patient may add a non-comedogenic moisturizer.  The most commonly reported side effects including irritation, redness, scaling, dryness, stinging, burning, itching, and increased risk of sunburn.  The patient verbalized understanding of the proper use and possible adverse effects of retinoids.  All of the patient's questions and concerns were addressed. Doxycycline Counseling:  Patient counseled regarding possible photosensitivity and increased risk for sunburn.  Patient instructed to avoid sunlight, if possible.  When exposed to sunlight, patients should wear protective clothing, sunglasses, and sunscreen.  The patient was instructed to call the office immediately if the following severe adverse effects occur:  hearing changes, easy bruising/bleeding, severe headache, or vision changes.  The patient verbalized understanding of the proper use and possible adverse effects of doxycycline.  All of the patient's questions and concerns were addressed. Include Pregnancy/Lactation Warning?: No Tazorac Counseling:  Patient advised that medication is irritating and drying.  Patient may need to apply sparingly and wash off after an hour before eventually leaving it on overnight.  The patient verbalized understanding of the proper use and possible adverse effects of tazorac.  All of the patient's questions and concerns were addressed. 39 yo  at [id number] (MONTSERRAT 21) presents with elevated BPs in the office to 140s/90s. Blood pressures 150s/90s in triage. Patient denies s/s of PEC at this time. PNC c/b IUGR, 8%, last sono today in the office per patient.     #r/o sPEC  - BPs 140s-150s/90s  - F/u HELLP labs, P/C ratio    - 24-hr urine protein  - Continue to monitor BPs closely  - Appreciate MFM consult     #IUGR 8%  - Continous EFM  - Consider BMZ   - F/u GBS     #Maternal wellbeing  - NPO, IVF   - PNV, folic acid  - Synthroid 50  Sarecycline Counseling: Patient advised regarding possible photosensitivity and discoloration of the teeth, skin, lips, tongue and gums.  Patient instructed to avoid sunlight, if possible.  When exposed to sunlight, patients should wear protective clothing, sunglasses, and sunscreen.  The patient was instructed to call the office immediately if the following severe adverse effects occur:  hearing changes, easy bruising/bleeding, severe headache, or vision changes.  The patient verbalized understanding of the proper use and possible adverse effects of sarecycline.  All of the patient's questions and concerns were addressed. Erythromycin Counseling:  I discussed with the patient the risks of erythromycin including but not limited to GI upset, allergic reaction, drug rash, diarrhea, increase in liver enzymes, and yeast infections. Isotretinoin Counseling: Patient should get monthly blood tests, not donate blood, not drive at night if vision affected, not share medication, and not undergo elective surgery for 6 months after tx completed. Side effects reviewed, pt to contact office should one occur. Benzoyl Peroxide Counseling: Patient counseled that medicine may cause skin irritation and bleach clothing.  In the event of skin irritation, the patient was advised to reduce the amount of the drug applied or use it less frequently.   The patient verbalized understanding of the proper use and possible adverse effects of benzoyl peroxide.  All of the patient's questions and concerns were addressed. High Dose Vitamin A Counseling: Side effects reviewed, pt to contact office should one occur. Minocycline Counseling: Patient advised regarding possible photosensitivity and discoloration of the teeth, skin, lips, tongue and gums.  Patient instructed to avoid sunlight, if possible.  When exposed to sunlight, patients should wear protective clothing, sunglasses, and sunscreen.  The patient was instructed to call the office immediately if the following severe adverse effects occur:  hearing changes, easy bruising/bleeding, severe headache, or vision changes.  The patient verbalized understanding of the proper use and possible adverse effects of minocycline.  All of the patient's questions and concerns were addressed. Topical Retinoid counseling:  Patient advised to apply a pea-sized amount only at bedtime and wait 30 minutes after washing their face before applying.  If too drying, patient may add a non-comedogenic moisturizer. The patient verbalized understanding of the proper use and possible adverse effects of retinoids.  All of the patient's questions and concerns were addressed. Topical Sulfur Applications Counseling: Topical Sulfur Counseling: Patient counseled that this medication may cause skin irritation or allergic reactions.  In the event of skin irritation, the patient was advised to reduce the amount of the drug applied or use it less frequently.   The patient verbalized understanding of the proper use and possible adverse effects of topical sulfur application.  All of the patient's questions and concerns were addressed. Topical Clindamycin Counseling: Patient counseled that this medication may cause skin irritation or allergic reactions.  In the event of skin irritation, the patient was advised to reduce the amount of the drug applied or use it less frequently.   The patient verbalized understanding of the proper use and possible adverse effects of clindamycin.  All of the patient's questions and concerns were addressed. Winlevi Counseling:  I discussed with the patient the risks of topical clascoterone including but not limited to erythema, scaling, itching, and stinging. Patient voiced their understanding. Dapsone Pregnancy And Lactation Text: This medication is Pregnancy Category C and is not considered safe during pregnancy or breast feeding. Birth Control Pills Pregnancy And Lactation Text: This medication should be avoided if pregnant and for the first 30 days post-partum. Bactrim Pregnancy And Lactation Text: This medication is Pregnancy Category D and is known to cause fetal risk.  It is also excreted in breast milk. Azithromycin Pregnancy And Lactation Text: This medication is considered safe during pregnancy and is also secreted in breast milk. Tetracycline Pregnancy And Lactation Text: This medication is Pregnancy Category D and not consider safe during pregnancy. It is also excreted in breast milk. Aklief Pregnancy And Lactation Text: It is unknown if this medication is safe to use during pregnancy.  It is unknown if this medication is excreted in breast milk.  Breastfeeding women should use the topical cream on the smallest area of the skin for the shortest time needed while breastfeeding.  Do not apply to nipple and areola. Doxycycline Pregnancy And Lactation Text: This medication is Pregnancy Category D and not consider safe during pregnancy. It is also excreted in breast milk but is considered safe for shorter treatment courses. Tazorac Pregnancy And Lactation Text: This medication is not safe during pregnancy. It is unknown if this medication is excreted in breast milk. Erythromycin Pregnancy And Lactation Text: This medication is Pregnancy Category B and is considered safe during pregnancy. It is also excreted in breast milk. Isotretinoin Pregnancy And Lactation Text: This medication is Pregnancy Category X and is considered extremely dangerous during pregnancy. It is unknown if it is excreted in breast milk. Spironolactone Pregnancy And Lactation Text: This medication can cause feminization of the male fetus and should be avoided during pregnancy. The active metabolite is also found in breast milk. High Dose Vitamin A Pregnancy And Lactation Text: High dose vitamin A therapy is contraindicated during pregnancy and breast feeding. Azelaic Acid Pregnancy And Lactation Text: This medication is considered safe during pregnancy and breast feeding. Topical Retinoid Pregnancy And Lactation Text: This medication is Pregnancy Category C. It is unknown if this medication is excreted in breast milk. Benzoyl Peroxide Pregnancy And Lactation Text: This medication is Pregnancy Category C. It is unknown if benzoyl peroxide is excreted in breast milk. 39 yo  at [id number] (MONTSERRAT 21) presents with elevated BPs in the office to 140s/90s. Blood pressures 150s/90s in triage. Patient denies s/s of PEC at this time. PNC c/b IUGR, 8%, last sono today in the office per patient.     #r/o sPEC  - BPs 140s-150s/90s  - F/u HELLP labs, P/C ratio    - 24-hr urine protein  - Continue to monitor BPs closely  - Appreciate MFM consult     #IUGR 8%  - Bedside sono (): breech, fundal placenta, HEENA 20.5, BPP    - Continous EFM  - BMZ (-)   - F/u GBS     #Maternal wellbeing  - NPO, IVF   - PNV, folic acid  - Synthroid 50   - F/u COVID    41 yo  at 91ymf1p (MONTSERRAT 21) presents with elevated BPs in the office to 140s/90s. Blood pressures 150s/90s in triage. Patient denies s/s of PEC at this time. PNC c/b IUGR, 8%, last sono today in the office per patient.     #r/o sPEC  - BPs 140s-150s/90s  - F/u HELLP labs, P/C ratio    - 24-hr urine protein  - Continue to monitor BPs closely  - Appreciate MFM consult     #IUGR 8%  - Bedside sono (): breech, fundal placenta, HEENA 20.5, BPP /   - ATU (): breech, ant rt fundal, HEENA 22.43, EFW  (8%), dopplers wnl    - Continous EFM  - BMZ (-)   - F/u GBS     #Maternal wellbeing  - NPO, IVF   - PNV, folic acid  - Synthroid 50   - F/u COVID

## 2024-02-28 ENCOUNTER — APPOINTMENT (OUTPATIENT)
Dept: CV DIAGNOSITCS | Facility: HOSPITAL | Age: 44
End: 2024-02-28

## 2024-03-09 ENCOUNTER — APPOINTMENT (OUTPATIENT)
Dept: CARDIOLOGY | Facility: CLINIC | Age: 44
End: 2024-03-09
Payer: COMMERCIAL

## 2024-03-09 PROCEDURE — 93306 TTE W/DOPPLER COMPLETE: CPT

## 2024-03-11 LAB
ALBUMIN SERPL ELPH-MCNC: 4.5 G/DL
ALP BLD-CCNC: 99 U/L
ALT SERPL-CCNC: 23 U/L
ANION GAP SERPL CALC-SCNC: 13 MMOL/L
AST SERPL-CCNC: 19 U/L
BILIRUB SERPL-MCNC: 0.6 MG/DL
BUN SERPL-MCNC: 14 MG/DL
CALCIUM SERPL-MCNC: 9.5 MG/DL
CHLORIDE SERPL-SCNC: 104 MMOL/L
CHOLEST SERPL-MCNC: 235 MG/DL
CO2 SERPL-SCNC: 25 MMOL/L
CREAT SERPL-MCNC: 0.66 MG/DL
EGFR: 113 ML/MIN/1.73M2
ESTIMATED AVERAGE GLUCOSE: 117 MG/DL
GLUCOSE SERPL-MCNC: 83 MG/DL
HBA1C MFR BLD HPLC: 5.7 %
HCT VFR BLD CALC: 40.2 %
HDLC SERPL-MCNC: 54 MG/DL
HGB BLD-MCNC: 13.6 G/DL
LDLC SERPL CALC-MCNC: 161 MG/DL
MCHC RBC-ENTMCNC: 29.6 PG
MCHC RBC-ENTMCNC: 33.8 GM/DL
MCV RBC AUTO: 87.4 FL
NONHDLC SERPL-MCNC: 181 MG/DL
PLATELET # BLD AUTO: 285 K/UL
POTASSIUM SERPL-SCNC: 4.2 MMOL/L
PROT SERPL-MCNC: 7.9 G/DL
RBC # BLD: 4.6 M/UL
RBC # FLD: 12.2 %
SODIUM SERPL-SCNC: 142 MMOL/L
TRIGL SERPL-MCNC: 108 MG/DL
TSH SERPL-ACNC: 3.72 UIU/ML
WBC # FLD AUTO: 6.99 K/UL

## 2024-03-13 ENCOUNTER — RESULT REVIEW (OUTPATIENT)
Age: 44
End: 2024-03-13

## 2024-03-14 ENCOUNTER — APPOINTMENT (OUTPATIENT)
Dept: CV DIAGNOSTICS | Facility: HOSPITAL | Age: 44
End: 2024-03-14

## 2024-03-14 ENCOUNTER — OUTPATIENT (OUTPATIENT)
Dept: OUTPATIENT SERVICES | Facility: HOSPITAL | Age: 44
LOS: 1 days | End: 2024-03-14
Payer: SELF-PAY

## 2024-03-14 DIAGNOSIS — K08.409 PARTIAL LOSS OF TEETH, UNSPECIFIED CAUSE, UNSPECIFIED CLASS: Chronic | ICD-10-CM

## 2024-03-14 DIAGNOSIS — Z98.890 OTHER SPECIFIED POSTPROCEDURAL STATES: Chronic | ICD-10-CM

## 2024-03-14 DIAGNOSIS — R06.09 OTHER FORMS OF DYSPNEA: ICD-10-CM

## 2024-03-14 DIAGNOSIS — Z98.891 HISTORY OF UTERINE SCAR FROM PREVIOUS SURGERY: Chronic | ICD-10-CM

## 2024-03-14 PROCEDURE — 93016 CV STRESS TEST SUPVJ ONLY: CPT

## 2024-03-14 PROCEDURE — 93018 CV STRESS TEST I&R ONLY: CPT

## 2024-03-14 PROCEDURE — 93017 CV STRESS TEST TRACING ONLY: CPT

## 2024-03-18 ENCOUNTER — APPOINTMENT (OUTPATIENT)
Dept: CARDIOLOGY | Facility: CLINIC | Age: 44
End: 2024-03-18
Payer: COMMERCIAL

## 2024-03-18 VITALS
OXYGEN SATURATION: 100 % | HEIGHT: 60 IN | DIASTOLIC BLOOD PRESSURE: 79 MMHG | WEIGHT: 109 LBS | HEART RATE: 92 BPM | BODY MASS INDEX: 21.4 KG/M2 | SYSTOLIC BLOOD PRESSURE: 125 MMHG

## 2024-03-18 PROCEDURE — 99214 OFFICE O/P EST MOD 30 MIN: CPT | Mod: 25

## 2024-03-18 PROCEDURE — 93000 ELECTROCARDIOGRAM COMPLETE: CPT

## 2024-03-18 NOTE — DISCUSSION/SUMMARY
[EKG obtained to assist in diagnosis and management of assessed problem(s)] : EKG obtained to assist in diagnosis and management of assessed problem(s) [FreeTextEntry1] : interval note 3/18/24 delivered 23 -   post delivery had post partum PEC - started on Mg drip - discharged home on procardia 60 BP at home in low 120s - now off of meds pt is breastfeeding asymptomatic - denies chest pain, SOB/ANDRADE, palpitations, lightheadedness or syncope  - BP stable - off of meds - Encouraged the patient to monitor blood pressure at home, keep a log, and report results back to us for evaluation. Based on results, we will adjust the regimen as necessary. - the stress test and echo were d/w the pt - no further cardaic work up needed at this time - blood work was reviewed as well - - 6 mo of lifestyle modifications were d/w the pt at length - Encouraged patient to continue healthy exercise and eating habits, focusing on a Mediterranean style of eating and aiming for the recommended 150 minutes per week of moderate physical activity. - Encouraged the patient to find healthy outlets and coping mechanisms to help manage stress, such as physical activity/exercise, reducing workload if possible, spending time with family and friends, engaging in an enjoyable hobby, or using meditation or mindfulness techniques.

## 2024-03-18 NOTE — PHYSICAL EXAM
[Well Developed] : well developed [Well Nourished] : well nourished [No Acute Distress] : no acute distress [Normal Conjunctiva] : normal conjunctiva [Normal S1, S2] : normal S1, S2 [Normal Venous Pressure] : normal venous pressure [No Carotid Bruit] : no carotid bruit [No Murmur] : no murmur [No Rub] : no rub [No Gallop] : no gallop [Clear Lung Fields] : clear lung fields [Good Air Entry] : good air entry [No Respiratory Distress] : no respiratory distress  [Soft] : abdomen soft [Non Tender] : non-tender [No Masses/organomegaly] : no masses/organomegaly [Normal Bowel Sounds] : normal bowel sounds [No Cyanosis] : no cyanosis [Normal Gait] : normal gait [No Edema] : no edema [No Varicosities] : no varicosities [No Clubbing] : no clubbing [No Rash] : no rash [No Skin Lesions] : no skin lesions [Moves all extremities] : moves all extremities [No Focal Deficits] : no focal deficits [Normal Speech] : normal speech [Alert and Oriented] : alert and oriented [Normal memory] : normal memory

## 2024-03-18 NOTE — HISTORY OF PRESENT ILLNESS
[FreeTextEntry1] : 43 year old female, originally from [facility] with history of hypothyroidism and  a recent emergency  at 34 weeks and 6 days for preeclampsia and hypertension. Treated with IV Magnesium X 24 hours.   She carries a strong family history of both maternal and paternal hypertension.  Patient presents via telehealth [facility] for blood pressure management and cardiovascular screening and risk assessment. .   Her pregnancy history is as follows:  1st pregnancy-   termination  2nd pregnancy- 2020 early miscarriage: 5 weeks  3rd pregnancy- 2020 Delivered at 34 weeks and 6 days: Baby girl by  on 2021.  interval note 23 delivered 23 -   post delivery had post partum PEC - started on Mg drip - discharged home on procardia 60 BP at home in low 120s pt is breastfeeding asymptomatic - denies chest pain, SOB/ANDRADE, palpitations, lightheadedness or syncope  interval note 3/18/24 delivered 23 -   post delivery had post partum PEC - started on Mg drip - discharged home on procardia 60 BP at home in low 120s - now off of meds pt is breastfeeding asymptomatic - denies chest pain, SOB/ANDRADE, palpitations, lightheadedness or syncope

## 2024-03-27 ENCOUNTER — APPOINTMENT (OUTPATIENT)
Dept: ENDOCRINOLOGY | Facility: CLINIC | Age: 44
End: 2024-03-27
Payer: COMMERCIAL

## 2024-03-27 DIAGNOSIS — E03.9 HYPOTHYROIDISM, UNSPECIFIED: ICD-10-CM

## 2024-03-27 PROCEDURE — 99212 OFFICE O/P EST SF 10 MIN: CPT

## 2024-03-27 RX ORDER — LEVOTHYROXINE SODIUM 0.03 MG/1
25 TABLET ORAL DAILY
Qty: 90 | Refills: 2 | Status: ACTIVE | COMMUNITY
Start: 2023-02-16 | End: 1900-01-01

## 2024-03-27 NOTE — REASON FOR VISIT
[Follow - Up] : a follow-up visit [Hypothyroidism] : hypothyroidism [Home] : at home, [unfilled] , at the time of the visit. [Medical Office: ([facility])___] : at the medical office located in  [Verbal consent obtained from patient] : the patient, [unfilled]

## 2024-04-23 PROBLEM — E03.9 ACQUIRED HYPOTHYROIDISM: Status: ACTIVE | Noted: 2021-11-22

## 2024-04-23 NOTE — HISTORY OF PRESENT ILLNESS
[FreeTextEntry1] : CC: Thyroid check  This is a 43-year-old female with Hashimoto's thyroiditis, hyperprolactinemia, and pituitary microadenoma.  She was found to have hyperprolactinemia, low TSH, and pituitary microadenoma during a workup for infertility. She delivered via  on 2021, at 34 weeks +6 days due to preeclampsia and gestational diabetes. There is no family history of thyroid disease. She delivered 2023 at 37 weeks' gestation due to gestational diabetes via . She developed postpartum preeclampsia. She is on levothyroxine 25 mcg daily. This dose was not changed after she delivered as last appointment was 2023.  She is currently breastfeeding. She denies headaches or blurry vision.  She is not currently trying to conceive.   MRI pituitary from 2020 showed 4.6mm area of hypoenhancement in the left lateral pituitary and 3mm hypoenhancement in the right posterior pituitary gland with infundibulium deviated rightward. There is no optic nerve or optic chiasm compression.

## 2024-04-23 NOTE — ASSESSMENT
[FreeTextEntry1] : This is a 43-year-old female with Hashimoto's thyroiditis, subclinical hypothyroidism, hyperprolactinemia, and pituitary microadenoma, here for a follow-up.   1. Hashimoto's thyroiditis She is on levothyroxine 25 mcg daily.  Recent TSH normal.  She is not currently trying to conceive.  2. Hyperprolactinemia Normal on repeat x 3. 3. Pituitary microadenomas Check pituitary MRI when she completes breastfeeding. Denies headaches or blurry vision.

## 2024-04-23 NOTE — ADDENDUM
[FreeTextEntry1] :  By signing my name below, I, Mellissa Corrigan, attest that this document has been prepared under the direction and in the presence of Dr. Bocanegra.  I, Rita Bocanegra MD, personally performed the services described in this documentation. All medical record entries made by the scribe were at my discretion and in my presence. I have reviewed the chart and discharge instructions (if applicable) and agree that the record reflects my personal permanence and is accurate and complete.

## 2024-08-16 NOTE — OB RN INTRAOPERATIVE NOTE - NS_IMPLANTS_OBGYN_ALL_OB
Patient with Hypercapnic and Hypoxic Respiratory failure which is Acute on chronic.  she is on home oxygen at 4 LPM. Supplemental oxygen was provided and noted- Oxygen Concentration (%):  [40] 40    .   Signs/symptoms of respiratory failure include- tachypnea, increased work of breathing, respiratory distress, use of accessory muscles, wheezing, and lethargy. Contributing diagnoses includes - CHF, COPD, Interstitial lung disease, and Obesity Hypoventilation Labs and images were reviewed. Patient Has not had a recent ABG. Will treat underlying causes and adjust management of respiratory failure as follows- treat underlying heart failure and COPD exacerbation  8/10 ND continue current treatment with IV antibiotics IV steroids and nebs.  We will continue BiPAP as needed for respiratory support and O2 support  8/11 continue IV antibiotics will begin weaning steroids continue nebs.  O2 protocol will attempt to wean BiPAP today  8/13: Continue to wean BiPAP as tolerated.  8/14: Patient now tolerating Ventimask.  8/16:  tolerating NC  
THERAPEUTIC PHLEBOTOMY    Most recent Hemoglobin 15.7 Gm/dl and Hematocrit 46.8%    Date obtained: 1 /25 /2021      Pre-phlebotomy Vital Signs: Refer to Doc flow sheet    Start time: 10:20    Tourniquet placed on patient's arm and arm palpated for venous access. Area of venous access cleansed with alcohol. Sheath removed from the needle and needle inserted into the left antecubital site. Blood flowing well down the tubing into collection bag. Adjustment/no adjustment of needle needed to maintain adequate blood flow. Scale monitoring amount of blood in bag. Stop Time: 10:40  Needle removed from patient's arm. Pressure applied to patient's arm until bleeding stopped. Dry sterile dressing applied over puncture site and secured with Coban/self-adherent wrap. Final amount of blood removed: 300cc  Post Vital Signs: Refer to Doc flow sheet      Patient tolerated procedure well. No redness, edema, or signs of active bleeding noted. Discharge Instructions provided: No heavy pushing or lifting for the next 24 hours. Keep dressing dry and in place for 4 to 6 hours. If a fever GREATER than 100.5 develops, contact office. Patient discharged ambulatory with belongings.
Dermabond/Surgicel Hemostat

## 2024-08-28 ENCOUNTER — RX RENEWAL (OUTPATIENT)
Age: 44
End: 2024-08-28

## 2024-09-14 NOTE — DISCHARGE NOTE OB - MEDICATION SUMMARY - MEDICATIONS TO TAKE
(4) Less than 3 years old
I will START or STAY ON the medications listed below when I get home from the hospital:    acetaminophen 325 mg oral tablet  -- 3 tab(s) by mouth   -- Indication: For mild    ibuprofen 600 mg oral tablet  -- 1 tab(s) by mouth every 6 hours  -- Indication: For moderate    oxyCODONE 5 mg oral tablet  -- 1 tab(s) by mouth every 3 hours, As needed, Moderate to Severe Pain (4-10) MDD:6  -- Indication: For Sever pain    NIFEdipine 30 mg oral tablet, extended release  -- 1 tab(s) by mouth once a day  -- Indication: For preeclampsia    levothyroxine 100 mcg (0.1 mg) oral tablet  -- 1 tab(s) by mouth once a day  -- Indication: For Hypothyroid

## 2024-09-23 ENCOUNTER — APPOINTMENT (OUTPATIENT)
Dept: CARDIOLOGY | Facility: CLINIC | Age: 44
End: 2024-09-23

## 2024-09-23 NOTE — DISCUSSION/SUMMARY
[FreeTextEntry1] : interval note 3/18/24 delivered 23 -   post delivery had post partum PEC - started on Mg drip - discharged home on procardia 60 BP at home in low 120s - now off of meds pt is breastfeeding asymptomatic - denies chest pain, SOB/ANDRADE, palpitations, lightheadedness or syncope  - BP stable - off of meds - Encouraged the patient to monitor blood pressure at home, keep a log, and report results back to us for evaluation. Based on results, we will adjust the regimen as necessary. - the stress test and echo were d/w the pt - no further cardaic work up needed at this time - blood work was reviewed as well - - 6 mo of lifestyle modifications were d/w the pt at length - Encouraged patient to continue healthy exercise and eating habits, focusing on a Mediterranean style of eating and aiming for the recommended 150 minutes per week of moderate physical activity. - Encouraged the patient to find healthy outlets and coping mechanisms to help manage stress, such as physical activity/exercise, reducing workload if possible, spending time with family and friends, engaging in an enjoyable hobby, or using meditation or mindfulness techniques.

## 2024-09-23 NOTE — HISTORY OF PRESENT ILLNESS
[FreeTextEntry1] : 43 year old female, originally from [facility] with history of hypothyroidism and  a recent emergency  at 34 weeks and 6 days for preeclampsia and hypertension. Treated with IV Magnesium X 24 hours.   She carries a strong family history of both maternal and paternal hypertension.  Patient presents via telehealth to [facility] for blood pressure management and cardiovascular screening and risk assessment. .   Her pregnancy history is as follows:  1st pregnancy-   termination  2nd pregnancy- 2020 early miscarriage: 5 weeks  3rd pregnancy- 2020 Delivered at 34 weeks and 6 days: Baby girl by  on 2021.  interval note 23 delivered 23 -   post delivery had post partum PEC - started on Mg drip - discharged home on procardia 60 BP at home in low 120s pt is breastfeeding asymptomatic - denies chest pain, SOB/ANDRADE, palpitations, lightheadedness or syncope  interval note 3/18/24 delivered 23 -   post delivery had post partum PEC - started on Mg drip - discharged home on procardia 60 BP at home in low 120s - now off of meds pt is breastfeeding asymptomatic - denies chest pain, SOB/ANDRADE, palpitations, lightheadedness or syncope

## 2024-10-14 NOTE — OB RN PATIENT PROFILE - NS_PARA_OBGYN_ALL_OB_NU
I have reviewed the H&P, I have examined the patient, and the changes to the patient's condition are as follows: None none    Son in room with patient    All questions answered    Plan sleeve gastrectomy surgery 79510 and cholecystectomy with intraoperative cholangiogram [id number]      
1

## 2024-11-14 ENCOUNTER — APPOINTMENT (OUTPATIENT)
Dept: INTERNAL MEDICINE | Facility: CLINIC | Age: 44
End: 2024-11-14

## 2024-11-21 ENCOUNTER — APPOINTMENT (OUTPATIENT)
Dept: INTERNAL MEDICINE | Facility: CLINIC | Age: 44
End: 2024-11-21

## 2024-11-27 ENCOUNTER — RX RENEWAL (OUTPATIENT)
Age: 44
End: 2024-11-27

## 2025-01-14 ENCOUNTER — EMERGENCY (EMERGENCY)
Facility: HOSPITAL | Age: 45
LOS: 1 days | Discharge: HOME | End: 2025-01-14
Attending: EMERGENCY MEDICINE
Payer: COMMERCIAL

## 2025-01-14 VITALS
HEART RATE: 108 BPM | TEMPERATURE: 101 F | WEIGHT: 108.03 LBS | RESPIRATION RATE: 20 BRPM | DIASTOLIC BLOOD PRESSURE: 84 MMHG | SYSTOLIC BLOOD PRESSURE: 147 MMHG | OXYGEN SATURATION: 97 %

## 2025-01-14 VITALS
SYSTOLIC BLOOD PRESSURE: 109 MMHG | OXYGEN SATURATION: 99 % | DIASTOLIC BLOOD PRESSURE: 73 MMHG | HEART RATE: 82 BPM | RESPIRATION RATE: 18 BRPM

## 2025-01-14 DIAGNOSIS — K08.409 PARTIAL LOSS OF TEETH, UNSPECIFIED CAUSE, UNSPECIFIED CLASS: Chronic | ICD-10-CM

## 2025-01-14 DIAGNOSIS — Z98.891 HISTORY OF UTERINE SCAR FROM PREVIOUS SURGERY: Chronic | ICD-10-CM

## 2025-01-14 DIAGNOSIS — Z98.890 OTHER SPECIFIED POSTPROCEDURAL STATES: Chronic | ICD-10-CM

## 2025-01-14 LAB
FLUAV AG NPH QL: SIGNIFICANT CHANGE UP
FLUBV AG NPH QL: SIGNIFICANT CHANGE UP
RSV RNA NPH QL NAA+NON-PROBE: SIGNIFICANT CHANGE UP
SARS-COV-2 RNA SPEC QL NAA+PROBE: SIGNIFICANT CHANGE UP

## 2025-01-14 PROCEDURE — 99283 EMERGENCY DEPT VISIT LOW MDM: CPT

## 2025-01-14 PROCEDURE — 99284 EMERGENCY DEPT VISIT MOD MDM: CPT

## 2025-01-14 PROCEDURE — 87637 SARSCOV2&INF A&B&RSV AMP PRB: CPT

## 2025-01-14 RX ORDER — DIPHENHYDRAMINE HCL 25 MG
25 TABLET ORAL ONCE
Refills: 0 | Status: COMPLETED | OUTPATIENT
Start: 2025-01-14 | End: 2025-01-14

## 2025-01-14 RX ORDER — PREDNISONE 5 MG
40 TABLET ORAL ONCE
Refills: 0 | Status: COMPLETED | OUTPATIENT
Start: 2025-01-14 | End: 2025-01-14

## 2025-01-14 RX ORDER — PREDNISONE 5 MG
2 TABLET ORAL
Qty: 8 | Refills: 0
Start: 2025-01-14 | End: 2025-01-17

## 2025-01-14 RX ORDER — FAMOTIDINE 20 MG/1
20 TABLET, FILM COATED ORAL ONCE
Refills: 0 | Status: COMPLETED | OUTPATIENT
Start: 2025-01-14 | End: 2025-01-14

## 2025-01-14 RX ORDER — EPINEPHRINE 0.15 MG/.15ML
0.3 INJECTION, SOLUTION INTRAMUSCULAR
Qty: 1 | Refills: 0
Start: 2025-01-14

## 2025-01-14 RX ADMIN — FAMOTIDINE 20 MILLIGRAM(S): 20 TABLET, FILM COATED ORAL at 21:21

## 2025-01-14 RX ADMIN — Medication 40 MILLIGRAM(S): at 21:21

## 2025-01-14 RX ADMIN — Medication 25 MILLIGRAM(S): at 21:22

## 2025-01-14 NOTE — ED ADULT NURSE NOTE - NSICDXPASTSURGICALHX_GEN_ALL_CORE_FT
PAST SURGICAL HISTORY:  H/O:      S/P D&C (status post dilation and curettage)     [place] teeth extracted x2

## 2025-01-14 NOTE — ED PROVIDER NOTE - NSICDXPASTSURGICALHX_GEN_ALL_CORE_FT
PAST SURGICAL HISTORY:  H/O:      S/P D&C (status post dilation and curettage)     [place] teeth extracted x2    
with patient

## 2025-01-14 NOTE — ED PROVIDER NOTE - PHYSICAL EXAMINATION
T(C): 37.1 (01-14-25 @ 18:50), Max: 38.2 (01-14-25 @ 17:12)  HR: 85 (01-14-25 @ 18:50) (85 - 108)  BP: 164/77 (01-14-25 @ 18:50) (147/84 - 164/77)  RR: 20 (01-14-25 @ 18:50) (20 - 20)  SpO2: 100% (01-14-25 @ 18:50) (97% - 100%)    CONSTITUTIONAL: Well groomed, no apparent distress  EYES: PERRLA and symmetric, No conjunctival or scleral injection, non-icteric  ENMT: Oral mucosa with moist membranes. Normal dentition; no pharyngeal exudates or edema noted  RESP: No respiratory distress, no use of accessory muscles; CTA b/l, no WRR  CV: RRR, +S1S2, no MRG; no JVD; no peripheral edema  GI: Soft, NT, ND, no rebound, no guarding; no palpable masses; no hepatosplenomegaly; no hernia palpated  LYMPH: No cervical LAD or tenderness; no axillary LAD or tenderness; no inguinal LAD or tenderness  SKIN: Urticarial patches and plaques noted on face, torso and upper extremities, mild facial and periorbital edema

## 2025-01-14 NOTE — ED PROVIDER NOTE - NSFOLLOWUPINSTRUCTIONS_ED_ALL_ED_FT
Stop taking azithromycin.  Stay well hydrated.  Continue to take Motrin/Tylenol as directed as needed for fever.   Take prednisone (steroid) as prescribed. Next dose will be at bedtime tomorrow night  Take Pepcid (over the counter) as directed for the next 5 days  Take Benadryl as directed as needed for itching.    Use EpiPen as directed if you develop anaphylaxis (throat closing, trouble breathing, vomiting)  Follow-up with your primary doctor within 1-2 days    Return to an ER for worsening symptoms or any other concerns.

## 2025-01-14 NOTE — ED ADULT NURSE NOTE - NSFALLUNIVINTERV_ED_ALL_ED
Bed/Stretcher in lowest position, wheels locked, appropriate side rails in place/Call bell, personal items and telephone in reach/Instruct patient to call for assistance before getting out of bed/chair/stretcher/Non-slip footwear applied when patient is off stretcher/[place] to call system/Physically safe environment - no spills, clutter or unnecessary equipment/Purposeful proactive rounding/Room/bathroom lighting operational, light cord in reach

## 2025-01-14 NOTE — ED ADULT TRIAGE NOTE - CHIEF COMPLAINT QUOTE
fevers cough, took prescribed azithromycin. started having rash to face & trunk last night. took benadryl last night 25 mg, unsure if she had rash before taking abx. no SOB, no lip swelling no throat itchiness

## 2025-01-14 NOTE — ED PROVIDER NOTE - PATIENT PORTAL LINK FT
You can access the FollowMyHealth Patient Portal offered by [facility] by registering at the following website: [email or web address]. By joining "[employer]’s FollowMyHealth portal, you will also be able to view your health information using other applications (apps) compatible with our system.

## 2025-01-14 NOTE — ED PROVIDER NOTE - OBJECTIVE STATEMENT
45 y/o F with PMH of hypothyroidism presents to the ED complaining of fevers and rash x 2 days. Patient states she started having fevers (Tmax 103-104) and a sore throat on Sunday and saw her PCP yesterday and was prescribed Z-hattie. 30 minutes after taking the initial notes she broke out in an itchy rash. She took Benadryl around 11pm last night and noticed minimal improvement, no antihistamines today. Today she continues to have high fevers, alternating tylenol and motrin, she also noticed some facial and eyelid swelling which prompted her to come to the ED. She states she has never taken azithromycin before. She reports that her  tested positive for flu B last week. Denies chest pain/tightness, SOB, abdominal pain, changes in BM, headache, and any urinary symptoms. Reports no other known allergies.

## 2025-01-14 NOTE — ED ADULT NURSE NOTE - OBJECTIVE STATEMENT
44y Female AOx4 presents tot  ED c/o rash. Pt reports for the past few days she has been experiencing fevers, sore throat and cough. Was prescribed Azithromycin by PCP yesterday. After taking dose, pt developed rash to face & abdomen last night. Pt took benadryl last night 25 mg but minimal improvement. Later noted facial swelling eyes that concerned her and made per present to ED. Pt maintains patent airway. Denies SOB, difficulty breathing, tongue swelling.  Denies N/V, fever/chills, SOB, chest pain. Spontaneous/unlabored respirations, speaking in full sentences. Side rails up, bed in lowest position, safety maintained.

## 2025-01-14 NOTE — ED PROVIDER NOTE - CLINICAL SUMMARY MEDICAL DECISION MAKING FREE TEXT BOX
attending Angie: 44yF h/o hypothyroidism p/w fevers and rash x 2 days. Fever assoc with sore throat and body aches. Seen by PMD, prescribed Z-hattie. 30 minutes after taking the initial notes she broke out in an itchy rash. She took Benadryl around 11pm last night and noticed minimal improvement, no antihistamines today. Exam as above. Likely viral process, allergic reaction to zpack. Will treat allergic reaction, viral swab, reassess

## 2025-02-04 ENCOUNTER — RX RENEWAL (OUTPATIENT)
Age: 45
End: 2025-02-04

## 2025-03-25 ENCOUNTER — APPOINTMENT (OUTPATIENT)
Dept: ENDOCRINOLOGY | Facility: CLINIC | Age: 45
End: 2025-03-25
Payer: COMMERCIAL

## 2025-03-25 DIAGNOSIS — E22.1 HYPERPROLACTINEMIA: ICD-10-CM

## 2025-03-25 DIAGNOSIS — E06.3 AUTOIMMUNE THYROIDITIS: ICD-10-CM

## 2025-03-25 DIAGNOSIS — D35.2 BENIGN NEOPLASM OF PITUITARY GLAND: ICD-10-CM

## 2025-03-25 PROCEDURE — 99212 OFFICE O/P EST SF 10 MIN: CPT | Mod: 95

## 2025-04-05 ENCOUNTER — OUTPATIENT (OUTPATIENT)
Dept: OUTPATIENT SERVICES | Facility: HOSPITAL | Age: 45
LOS: 1 days | End: 2025-04-05
Payer: COMMERCIAL

## 2025-04-05 ENCOUNTER — APPOINTMENT (OUTPATIENT)
Dept: MRI IMAGING | Facility: CLINIC | Age: 45
End: 2025-04-05
Payer: COMMERCIAL

## 2025-04-05 DIAGNOSIS — K08.409 PARTIAL LOSS OF TEETH, UNSPECIFIED CAUSE, UNSPECIFIED CLASS: Chronic | ICD-10-CM

## 2025-04-05 DIAGNOSIS — E03.9 HYPOTHYROIDISM, UNSPECIFIED: ICD-10-CM

## 2025-04-05 DIAGNOSIS — Z98.891 HISTORY OF UTERINE SCAR FROM PREVIOUS SURGERY: Chronic | ICD-10-CM

## 2025-04-05 DIAGNOSIS — Z98.890 OTHER SPECIFIED POSTPROCEDURAL STATES: Chronic | ICD-10-CM

## 2025-04-05 PROCEDURE — 70553 MRI BRAIN STEM W/O & W/DYE: CPT | Mod: 26

## 2025-04-05 PROCEDURE — 70553 MRI BRAIN STEM W/O & W/DYE: CPT

## 2025-04-05 PROCEDURE — A9585: CPT

## 2025-06-06 NOTE — PRE-ANESTHESIA EVALUATION ADULT - NSANTHPEFT_GEN_ALL_CORE
GYN ANNUAL EXAM     Chief Complaint   Patient presents with    Annual Exam     AE and last pap        HPI    Yee is a 40 y.o. female who presents for annual well woman exam. She is a patient of Dr. Ibarra.     OB History          2    Para   2    Term   2            AB        Living   2         SAB        IAB        Ectopic        Molar        Multiple        Live Births   2                SUBJECTIVE    MENSTRUAL & SEXUAL HEALTH  LMP: Patient's last menstrual period was 05/15/2025.  Menses regularity: regular every 28-30 days  Menses length: 7 days  Dysmenorrhea: moderate, occurring throughout menses  Cyclic symptoms: none  Current contraception: none  Last pap: , Normal PAP  History of abnormal pap: no  History of STD: No  Family history of cancer: breast cancer and ovarian cancer       Family history of breast cancer: yes - maternal aunt, maternal grandmother  Performs SBE: performs monthly.  Incontinence: Patient reports that she is not currently experiencing any symptoms of urinary incontinence.   Dyspareunia: no    Past Medical History:   Diagnosis Date    Hypothyroidism        Past Surgical History:   Procedure Laterality Date    BLADDER SUSPENSION      [facility]    BLADDER SUSPENSION  2022    Dr. Espinoza [place]     SECTION      TUBAL ABDOMINAL LIGATION           Current Outpatient Medications:     clindamycin (CLEOCIN T) 1 % lotion, , Disp: , Rfl:     Diclofenac Sodium (VOLTAREN) 1 % gel gel, Apply 1 g topically to the appropriate area as directed., Disp: , Rfl:     EQ Bacitracin Zinc 500 UNIT/GM ointment, APPLY TO AFFECTED AREA TOPICALLY TWICE DAILY, Disp: , Rfl:     hydrocortisone 2.5 % cream, Apply  topically to the appropriate area as directed., Disp: , Rfl:     spironolactone (ALDACTONE) 100 MG tablet, , Disp: , Rfl:     triamcinolone (KENALOG) 0.1 % cream, APPLY CREAM EXTERNALLY TO AFFECTED AREA AT BEDTIME FOR 4 WEEKS THEN STOP FOR TWO WEEKS. MAY  "REPEAT. MIX WITH UREA., Disp: , Rfl:     levothyroxine (SYNTHROID, LEVOTHROID) 112 MCG tablet, Take 1 tablet by mouth., Disp: , Rfl:     No Known Allergies    Social History     Tobacco Use    Smoking status: Never    Smokeless tobacco: Never   Substance Use Topics    Alcohol use: Never    Drug use: Never       Family History   Problem Relation Age of Onset    Pancreatic cancer Mother     Breast cancer Maternal Grandmother 70    Breast cancer Maternal Aunt     Ovarian cancer Maternal Aunt     Colon cancer Neg Hx     Deep vein thrombosis Neg Hx     Pulmonary embolism Neg Hx        Review of Systems   Constitutional:  Negative for fatigue, unexpected weight gain and unexpected weight loss.   Gastrointestinal:  Negative for abdominal pain.   Genitourinary:  Negative for decreased libido, difficulty urinating, dyspareunia, dysuria, pelvic pain, pelvic pressure, urgency, urinary incontinence and vaginal discharge.   All other systems reviewed and are negative.      OBJECTIVE    /79   Ht 162.6 cm (64.02\")   Wt 79.2 kg (174 lb 9.6 oz)   LMP 05/15/2025   BMI 29.95 kg/m²     Physical Exam  Constitutional:       General: She is awake. She is not in acute distress.     Appearance: Normal appearance. She is well-developed and well-groomed. She is not ill-appearing.   Genitourinary:      Vulva, bladder and urethral meatus normal.      Right Labia: No rash, tenderness, lesions or skin changes.     Left Labia: No tenderness, lesions, skin changes or rash.     No labial fusion noted.      No inguinal adenopathy present in the right or left side.     No vaginal discharge, erythema, tenderness, bleeding, ulceration or granulation tissue.      No vaginal prolapse present.     No vaginal atrophy present.     No cervical discharge, friability, lesion, polyp, eversion or elongation.      Uterus is not enlarged, tender or prolapsed.      Pelvic exam was performed with patient in the lithotomy position.   Breasts:     Breasts are " symmetrical.      Breasts are soft.     Right: Normal.      Left: Normal.   HENT:      Head: Normocephalic and atraumatic.   Eyes:      General: No scleral icterus.     Conjunctiva/sclera: Conjunctivae normal.   Cardiovascular:      Rate and Rhythm: Normal rate and regular rhythm.      Heart sounds: Normal heart sounds.   Pulmonary:      Effort: Pulmonary effort is normal.      Breath sounds: Normal breath sounds.   Abdominal:      Palpations: Abdomen is soft.      Hernia: There is no hernia in the left inguinal area or right inguinal area.   Musculoskeletal:         General: Normal range of motion.      Cervical back: Normal range of motion.   Lymphadenopathy:      Lower Body: No right inguinal adenopathy. No left inguinal adenopathy.   Neurological:      Mental Status: She is alert.   Skin:     General: Skin is warm and dry.      Coloration: Skin is not jaundiced or pale.   Psychiatric:         Behavior: Behavior normal. Behavior is cooperative.   Vitals reviewed.         ASSESSMENT     Diagnoses and all orders for this visit:    1. Encounter for gynecological examination without abnormal finding (Primary)  -     IGP, Apt HPV,rfx 16 / 18,45    2. Family history of ovarian cancer    3. Family history of breast cancer         PLAN   WELL WOMAN EXAM: Pap smear collected today. Recommend MVI daily.    CONTRACEPTION: none.   FAMILY HISTORY OF OVARIAN & BREAST CANCER: Education provided via AVS on the ovarian cancer screening program at [facility]. Strongly encouraged patient to participate.   SMOKING STATUS: non smoker.  BREAST HEALTH: Encouraged annual mammogram screening starting at age 40. Instructed on how to perform SBE. Encouraged breast health self awareness.  EXERCISE: Encouraged 150 minutes of exercise per week if not medially contraindicated.   BMI: Body mass index is 29.95 kg/m².     Return in about 1 year (around 6/6/2026) for annual exam or as needed before.    I spent 30 minutes caring for  Yee on this date of service. This time includes time spent by me in the following activities, but not limited to: preparing for the visit, reviewing tests, performing a medically appropriate examination and/or evaluation, counseling and educating the patient/family/caregiver, referring and communicating with other health care professionals, documenting information in the medical record, independently interpreting results and communicating that information with the patient/family/caregiver, care coordination, ordering medications, ordering test(s), ordering procedure(s), obtaining a separately obtained history, and reviewing a separately obtained history.    Ly Becker CNM  6/8/2025  11:19 EDT     CV: RRR  Lungs: CTAB  Regional site: [facility]

## 2025-06-18 ENCOUNTER — NON-APPOINTMENT (OUTPATIENT)
Age: 45
End: 2025-06-18

## 2025-06-18 ENCOUNTER — APPOINTMENT (OUTPATIENT)
Dept: CARDIOLOGY | Facility: CLINIC | Age: 45
End: 2025-06-18

## 2025-06-18 ENCOUNTER — APPOINTMENT (OUTPATIENT)
Dept: ELECTROPHYSIOLOGY | Facility: CLINIC | Age: 45
End: 2025-06-18

## 2025-06-18 VITALS
WEIGHT: 109 LBS | BODY MASS INDEX: 21.29 KG/M2 | OXYGEN SATURATION: 100 % | HEART RATE: 93 BPM | SYSTOLIC BLOOD PRESSURE: 134 MMHG | DIASTOLIC BLOOD PRESSURE: 81 MMHG

## 2025-06-18 PROBLEM — R00.2 HEART PALPITATIONS: Status: ACTIVE | Noted: 2025-06-18

## 2025-06-18 PROCEDURE — 99213 OFFICE O/P EST LOW 20 MIN: CPT

## 2025-06-18 PROCEDURE — G2211 COMPLEX E/M VISIT ADD ON: CPT | Mod: NC

## 2025-06-18 PROCEDURE — 93005 ELECTROCARDIOGRAM TRACING: CPT

## 2025-07-10 ENCOUNTER — NON-APPOINTMENT (OUTPATIENT)
Age: 45
End: 2025-07-10

## 2025-07-10 PROCEDURE — 93248 EXT ECG>7D<15D REV&INTERPJ: CPT

## 2025-07-22 ENCOUNTER — APPOINTMENT (OUTPATIENT)
Dept: ELECTROPHYSIOLOGY | Facility: CLINIC | Age: 45
End: 2025-07-22
Payer: COMMERCIAL

## 2025-07-22 VITALS
BODY MASS INDEX: 21.29 KG/M2 | HEART RATE: 74 BPM | DIASTOLIC BLOOD PRESSURE: 81 MMHG | SYSTOLIC BLOOD PRESSURE: 130 MMHG | WEIGHT: 109 LBS | OXYGEN SATURATION: 100 %

## 2025-07-22 DIAGNOSIS — I49.3 VENTRICULAR PREMATURE DEPOLARIZATION: ICD-10-CM

## 2025-07-22 PROCEDURE — 99203 OFFICE O/P NEW LOW 30 MIN: CPT | Mod: 25

## 2025-07-22 PROCEDURE — 93000 ELECTROCARDIOGRAM COMPLETE: CPT

## 2025-07-22 PROCEDURE — 99213 OFFICE O/P EST LOW 20 MIN: CPT | Mod: 25

## 2025-07-22 RX ORDER — METOPROLOL TARTRATE 25 MG/1
25 TABLET ORAL DAILY
Qty: 30 | Refills: 5 | Status: ACTIVE | COMMUNITY
Start: 2025-07-22 | End: 1900-01-01

## 2025-09-04 ENCOUNTER — APPOINTMENT (OUTPATIENT)
Dept: OBGYN | Facility: CLINIC | Age: 45
End: 2025-09-04
Payer: COMMERCIAL

## 2025-09-04 VITALS
HEIGHT: 60 IN | WEIGHT: 108 LBS | BODY MASS INDEX: 21.2 KG/M2 | DIASTOLIC BLOOD PRESSURE: 90 MMHG | SYSTOLIC BLOOD PRESSURE: 154 MMHG

## 2025-09-04 VITALS — DIASTOLIC BLOOD PRESSURE: 80 MMHG | SYSTOLIC BLOOD PRESSURE: 146 MMHG

## 2025-09-04 DIAGNOSIS — N94.6 DYSMENORRHEA, UNSPECIFIED: ICD-10-CM

## 2025-09-04 DIAGNOSIS — Z01.419 ENCOUNTER FOR GYNECOLOGICAL EXAMINATION (GENERAL) (ROUTINE) W/OUT ABNORMAL FINDINGS: ICD-10-CM

## 2025-09-04 DIAGNOSIS — N92.6 IRREGULAR MENSTRUATION, UNSPECIFIED: ICD-10-CM

## 2025-09-04 PROCEDURE — 99213 OFFICE O/P EST LOW 20 MIN: CPT | Mod: 25

## 2025-09-04 PROCEDURE — 99459 PELVIC EXAMINATION: CPT | Mod: NC

## 2025-09-04 PROCEDURE — 82274 ASSAY TEST FOR BLOOD FECAL: CPT | Mod: QW

## 2025-09-04 PROCEDURE — 99396 PREV VISIT EST AGE 40-64: CPT

## 2025-09-04 RX ORDER — NAPROXEN 500 MG/1
500 TABLET ORAL
Qty: 30 | Refills: 2 | Status: ACTIVE | COMMUNITY
Start: 2025-09-04 | End: 1900-01-01

## 2025-09-04 RX ORDER — ASCORBIC ACID, CALCIUM CITRATE, IRON, VITAMIN D, DL- ALPHA- TOCOPHEROL ACETATE, THIAMINE, RIBOFLAVIN, NIACINAMIDE, PYRIDOXINE HYDROCHLORIDE, FOLIC ACID, IODINE, ZINC, COPPER, DOCUSATE SODIUM, DOCONEXENT AND ICOSAPENT
35-1 & 300 KIT DAILY
Qty: 60 | Refills: 7 | Status: ACTIVE | COMMUNITY
Start: 2025-09-04 | End: 1900-01-01

## 2025-09-05 LAB
DHEA-S SERPL-MCNC: 96.9 UG/DL
ESTIMATED AVERAGE GLUCOSE: 103 MG/DL
ESTRADIOL SERPL-MCNC: 31 PG/ML
FSH SERPL-MCNC: 4.9 IU/L
GLUCOSE SERPL-MCNC: 61 MG/DL
HBA1C MFR BLD HPLC: 5.2 %
HCG SERPL-MCNC: <1 MIU/ML
HPV HIGH+LOW RISK DNA PNL CVX: NOT DETECTED
LH SERPL-ACNC: 6.9 IU/L
PROLACTIN SERPL-MCNC: 13.3 NG/ML
T4 FREE SERPL-MCNC: 1.2 NG/DL
TESTOST FREE SERPL-MCNC: 0.2 PG/ML
TESTOST SERPL-MCNC: 4.2 NG/DL
TSH SERPL-ACNC: 3.08 UIU/ML

## 2025-09-08 LAB — CYTOLOGY CVX/VAG DOC THIN PREP: NORMAL

## 2025-09-09 LAB — 17OHP SERPL-MCNC: 19 NG/DL
